# Patient Record
Sex: MALE | Race: BLACK OR AFRICAN AMERICAN | NOT HISPANIC OR LATINO | Employment: UNEMPLOYED | URBAN - METROPOLITAN AREA
[De-identification: names, ages, dates, MRNs, and addresses within clinical notes are randomized per-mention and may not be internally consistent; named-entity substitution may affect disease eponyms.]

---

## 2017-03-08 NOTE — PROGRESS NOTES
"  SUBJECTIVE:                                                    Stewart Turcios is a 51 year old male who presents to clinic today for the following health issues:      Diabetes Follow-up  Glipizide 10mg qd, Insulin - Lantus 22U SubQ qd, Humalog 6U SubQ qd, Metformin 1,000mg BID  Patient is checking blood sugars: once daily.  Results are as follows:         am - 150-200    Diabetic concerns: None     Symptoms of hypoglycemia (low blood sugar): none     Paresthesias (numbness or burning in feet) or sores: No     Date of last diabetic eye exam: December 2016     Notes that he sometimes skips taking his Lantus at night time when he is \"tired\".    Typically only eats twice per day, once in morning and once in evening, so he does not take any insulin during the day. When he gets home in the evening his glucose levels are ~80-90 before eating dinner.    Hyperlipidemia Follow-up  Simvastatin 10mg qd    Rate your low fat/cholesterol diet?: not monitoring fat    Taking statin?  Yes, no muscle aches from statin    Other lipid medications/supplements?:  none     Hypertension Follow-up  Losartan 50mg qd    Outpatient blood pressures are not being checked.    Low Salt Diet: not monitoring salt     Medication Follow-up of BPH  Flomax 0.4mg qd     Taking Medication as prescribed: NO, ran out of medication    Side Effects:  None    Medication Helping Symptoms:  Yes, improved urinary frequency when taking the medication     - Normal colonoscopy 8/27/2008. On q 10 year colonoscopy schedule.      Family History: NO family history of prostate cancer.        Amount of exercise or physical activity: push-ups 2-3 days/week for an average of 15-30 minutes    Problems taking medications regularly: No    Medication side effects: none    Diet: watching portions (wife is on a diet)      Reviewed and updated as needed this visit by clinical staff  Tobacco  Allergies  Meds  Med Hx  Surg Hx  Fam Hx  Soc Hx      Reviewed and updated as " "needed this visit by Provider         ROS:   C: NEGATIVE for fever, chills, change in weight  R: NEGATIVE for significant cough or SOB  CV: NEGATIVE for chest pain, palpitations or peripheral edema  GI: NEGATIVE for nausea, abdominal pain, heartburn, or change in bowel habits  : POSITIVE for urinary frequency. NEGATIVE for dysuria, hematuria, decreased urinary stream, erectile dysfunction  MS: NEGATIVE for muscle aches      This document serves as a record of the services and decisions personally performed and made by Anahi Jean MD. It was created on his behalf by Lisandra Guido, a trained medical scribe. The creation of this document is based the provider's statements to the medical scribe.  Lisandra Guido 10:17 AM March 10, 2017    OBJECTIVE:                                                    /76 (BP Location: Left arm, Patient Position: Chair, Cuff Size: Adult Large)  Pulse 64  Temp 97.2  F (36.2  C) (Tympanic)  Ht 5' 11\" (1.803 m)  Wt 192 lb 8 oz (87.3 kg)  BMI 26.85 kg/m2  Body mass index is 26.85 kg/(m^2).     GENERAL: healthy, alert and no distress  RESP: lungs clear to auscultation - no rales, rhonchi or wheezes  CV: regular rate and rhythm, normal S1 S2, no murmur, no peripheral edema  MS: no gross musculoskeletal defects noted, no edema  PSYCH: mentation appears normal, affect normal/bright      Lab Results   Component Value Date    A1C 8.1 03/10/2017    A1C 8.7 07/07/2016    A1C 8.2 05/12/2016    A1C 8.1 03/14/2016    A1C 8.5 10/28/2015        ASSESSMENT/PLAN:                                                      (E11.9) Diabetes mellitus type 2, controlled, without complications (H)  (primary encounter diagnosis)  Comment: A1c 8.1, improved but goal is <8.0. Improved diet, but inconsistent evening Lantus use.  Plan: Hemoglobin A1c, FOOT EXAM - Increase evening Lantus by 2 units. Advised to take medications consistently.    (E78.5) Hyperlipidemia with target LDL less than 100  Comment: Primary " prevention. Tolerating statin well.  Plan: Continue statin.    (I10) Hypertension goal BP (blood pressure) < 140/90  Comment: /76, within guidelines on ARB.  Plan: Continue Losartan.    (N40.1,  R35.0) Benign prostatic hypertrophy with urinary frequency  Comment: Symptoms improved with medication.  Plan: tamsulosin (FLOMAX) 0.4 MG capsule - Refilled.        Patient will follow up in 3 months or sooner, PRN. Patient instructed to call with any questions or concerns.    Patient Instructions   *   The A1c, a 3 month average of your blood sugars, is 8.1. It's better, it was 8.7. Take the Lantus consistently.     *   If you have fasting morning blood sugars are > 130 for most to the week, may increase the Lantus, (evening shot), by 2 units per week.     *   Take the other medications.     *    Restart the Flomax.     *   Colonoscopy in August 2018.     *    Follow up in 3 months.       The information in this document, created by a scribe for me, accurately reflects the services I personally performed and the decisions made by me. I have reviewed and approved this document for accuracy.  10:33 AM March 10, 2017    Anahi Jean MD  [facility]

## 2017-03-10 ENCOUNTER — OFFICE VISIT (OUTPATIENT)
Dept: FAMILY MEDICINE | Facility: CLINIC | Age: 52
End: 2017-03-10
Payer: COMMERCIAL

## 2017-03-10 VITALS
SYSTOLIC BLOOD PRESSURE: 128 MMHG | WEIGHT: 192.5 LBS | DIASTOLIC BLOOD PRESSURE: 76 MMHG | TEMPERATURE: 97.2 F | HEART RATE: 64 BPM | HEIGHT: 71 IN | BODY MASS INDEX: 26.95 KG/M2

## 2017-03-10 DIAGNOSIS — R35.0 BENIGN PROSTATIC HYPERTROPHY WITH URINARY FREQUENCY: ICD-10-CM

## 2017-03-10 DIAGNOSIS — Z79.4 CONTROLLED TYPE 2 DIABETES MELLITUS WITHOUT COMPLICATION, WITH LONG-TERM CURRENT USE OF INSULIN (H): Primary | ICD-10-CM

## 2017-03-10 DIAGNOSIS — E11.9 CONTROLLED TYPE 2 DIABETES MELLITUS WITHOUT COMPLICATION, WITH LONG-TERM CURRENT USE OF INSULIN (H): Primary | ICD-10-CM

## 2017-03-10 DIAGNOSIS — I10 HYPERTENSION GOAL BP (BLOOD PRESSURE) < 140/90: ICD-10-CM

## 2017-03-10 DIAGNOSIS — E78.5 HYPERLIPIDEMIA WITH TARGET LDL LESS THAN 100: ICD-10-CM

## 2017-03-10 DIAGNOSIS — N40.1 BENIGN PROSTATIC HYPERTROPHY WITH URINARY FREQUENCY: ICD-10-CM

## 2017-03-10 LAB — HBA1C MFR BLD: 8.1 % (ref 4.3–6)

## 2017-03-10 PROCEDURE — 99207 C FOOT EXAM  NO CHARGE: CPT | Performed by: FAMILY MEDICINE

## 2017-03-10 PROCEDURE — 99214 OFFICE O/P EST MOD 30 MIN: CPT | Performed by: FAMILY MEDICINE

## 2017-03-10 PROCEDURE — 36415 COLL VENOUS BLD VENIPUNCTURE: CPT | Performed by: FAMILY MEDICINE

## 2017-03-10 PROCEDURE — 83036 HEMOGLOBIN GLYCOSYLATED A1C: CPT | Performed by: FAMILY MEDICINE

## 2017-03-10 RX ORDER — TAMSULOSIN HYDROCHLORIDE 0.4 MG/1
0.4 CAPSULE ORAL DAILY
Qty: 90 CAPSULE | Refills: 3 | Status: SHIPPED | OUTPATIENT
Start: 2017-03-10 | End: 2017-07-10

## 2017-03-10 NOTE — NURSING NOTE
"Chief Complaint   Patient presents with     Diabetes       Initial /76 (BP Location: Left arm, Patient Position: Chair, Cuff Size: Adult Large)  Pulse 64  Temp 97.2  F (36.2  C) (Tympanic)  Ht 5' 11\" (1.803 m)  Wt 192 lb 8 oz (87.3 kg)  BMI 26.85 kg/m2 Estimated body mass index is 26.85 kg/(m^2) as calculated from the following:    Height as of this encounter: 5' 11\" (1.803 m).    Weight as of this encounter: 192 lb 8 oz (87.3 kg).  Medication Reconciliation: complete    "

## 2017-03-10 NOTE — PATIENT INSTRUCTIONS
*   The A1c, a 3 month average of your blood sugars, is 8.1. It's better, it was 8.7. Take the Lantus consistently.     *   If you have fasting morning blood sugars are > 130 for most to the week, may increase the Lantus, (evening shot), by 2 units per week.     *   Take the other medications.     *    Restart the Flomax.     *   Colonoscopy in August 2018.     *    Follow up in 3 months.

## 2017-03-10 NOTE — MR AVS SNAPSHOT
After Visit Summary   3/10/2017    Stewart Turcios    MRN: [id number]           Patient Information     Date Of Birth          1965        Visit Information        Provider Department      3/10/2017 10:40 AM Anahi Jean MD [facility]        Today's Diagnoses     Diabetes mellitus type 2, controlled, without complications (H)    -  1    Hyperlipidemia with target LDL less than 100        Hypertension goal BP (blood pressure) < 140/90        Benign prostatic hypertrophy with urinary frequency          Care Instructions    *   The A1c, a 3 month average of your blood sugars, is 8.1. It's better, it was 8.7. Take the Lantus consistently.     *   If you have fasting morning blood sugars are > 130 for most to the week, may increase the Lantus, (evening shot), by 2 units per week.     *   Take the other medications.     *    Restart the Flomax.     *   Colonoscopy in August 2018.     *    Follow up in 3 months.         Follow-ups after your visit        Your next 10 appointments already scheduled     Mar 10, 2017 10:40 AM CST   Office Visit with Anahi Jean MD   [facility]    [address] [place] [id number] [phone]           Bring a current list of meds and any records pertaining to this visit.  For Physicals, please bring immunization records and any forms needing to be filled out.  Please arrive 10 minutes early to complete paperwork.              Who to contact     Normal or non-critical lab and imaging results will be communicated to you by MyChart, letter or phone within 4 business days after the clinic has received the results. If you do not hear from us within 7 days, please contact the clinic through MyChart or phone. If you have a critical or abnormal lab result, we will notify you by phone as soon as possible.  Submit refill requests through [employer] or call your pharmacy and they will forward the  "refill request to us. Please allow 3 business days for your refill to be completed.          If you need to speak with a  for additional information , please call: [phone]           Additional Information About Your Visit        [employer] Information     [employer] lets you send messages to your doctor, view your test results, renew your prescriptions, schedule appointments and more. To sign up, go to [email or web address] . Click on \"Log in\" on the left side of the screen, which will take you to the Welcome page. Then click on \"Sign up Now\" on the right side of the page.     You will be asked to enter the access code listed below, as well as some personal information. Please follow the directions to create your username and password.     Your access code is: [email or web address]  Expires: 2017 10:30 AM     Your access code will  in 90 days. If you need help or a new code, please call your [facility] clinic or [phone].        Care EveryWhere ID     This is your Care EveryWhere ID. This could be used by other organizations to access your [facility] medical records  [id number]        Your Vitals Were     Pulse Temperature Height BMI (Body Mass Index)          64 97.2  F (36.2  C) (Tympanic) 5' 11\" (1.803 m) 26.85 kg/m2         Blood Pressure from Last 3 Encounters:   03/10/17 128/76   16 134/74   16 118/70    Weight from Last 3 Encounters:   03/10/17 192 lb 8 oz (87.3 kg)   16 195 lb 2 oz (88.5 kg)   16 188 lb 6 oz (85.4 kg)              We Performed the Following     FOOT EXAM     Hemoglobin A1c          Today's Medication Changes          These changes are accurate as of: 3/10/17 10:35 AM.  If you have any questions, ask your nurse or doctor.               These medicines have changed or have updated prescriptions.        Dose/Directions    insulin glargine 100 UNIT/ML injection   Commonly known as:  LANTUS SOLOSTAR   This may have changed:  Another medication with the " same name was removed. Continue taking this medication, and follow the directions you see here.   Used for:  Controlled type 2 diabetes mellitus without complication, with long-term current use of insulin (H)   Changed by:  Anahi Jean MD        22 units at bedtime   Quantity:  3 Month   Refills:  1       * tamsulosin 0.4 MG capsule   Commonly known as:  FLOMAX   This may have changed:  Another medication with the same name was added. Make sure you understand how and when to take each.   Used for:  Increased frequency of urination   Changed by:  Anahi Jean MD        Dose:  0.4 mg   Take 1 capsule (0.4 mg) by mouth daily   Quantity:  90 capsule   Refills:  1       * tamsulosin 0.4 MG capsule   Commonly known as:  FLOMAX   This may have changed:  You were already taking a medication with the same name, and this prescription was added. Make sure you understand how and when to take each.   Used for:  Benign prostatic hypertrophy with urinary frequency   Changed by:  Anahi Jean MD        Dose:  0.4 mg   Take 1 capsule (0.4 mg) by mouth daily   Quantity:  90 capsule   Refills:  3       * Notice:  This list has 2 medication(s) that are the same as other medications prescribed for you. Read the directions carefully, and ask your doctor or other care provider to review them with you.      Stop taking these medicines if you haven't already. Please contact your care team if you have questions.     azithromycin 250 MG tablet   Commonly known as:  ZITHROMAX   Stopped by:  Anahi Jean MD           benzonatate 200 MG capsule   Commonly known as:  TESSALON   Stopped by:  Anahi Jean MD                Where to get your medicines      These medications were sent to [id number] [place] [address] [facility] [address]     Phone:  [phone]     tamsulosin 0.4 MG capsule                Primary Care Provider Office Phone # Fax #    Anahi Jean MD  [phone]       [facility] [address] [facility]   [place] [address]        Thank you!     Thank you for choosing [facility]  for your care. Our goal is always to provide you with excellent care. Hearing back from our patients is one way we can continue to improve our services. Please take a few minutes to complete the written survey that you may receive in the mail after your visit with us. Thank you!             Your Updated Medication List - Protect others around you: Learn how to safely use, store and throw away your medicines at www.disposemymeds.org.          This list is accurate as of: 3/10/17 10:35 AM.  Always use your most recent med list.                   Brand Name Dispense Instructions for use    aspirin 81 MG tablet     100    1 tab po QD (Once per day)       blood glucose lancing device     1 each    Device to be used with lancets.       blood glucose monitoring lancets     1 Box    Use to test blood sugar four times daily or as directed.       * blood glucose monitoring test strip    ACCU-CHEK SMARTVIEW    50 each    Use to test blood sugar two times daily or as directed.       * blood glucose monitoring test strip    ACCU-CHEK ANDER    300 each    Use to test blood sugars 4 times daily or as directed.       BLOOD GLUCOSE TEST STRIPS STRP     qs    twice daily and prn       glipiZIDE 10 MG 24 hr tablet    glipiZIDE XL    90 tablet    Take 1 tablet (10 mg) by mouth daily       insulin glargine 100 UNIT/ML injection    LANTUS SOLOSTAR    3 Month    22 units at bedtime       insulin lispro 100 UNIT/ML injection    HumaLOG KWIKpen    12 mL    6 units before breakfast, 6   units before dinner       * insulin pen needle 31G X 8 MM    B-D U/F    100 each    As directed.       * insulin pen needle 32G X 4 MM    BD MUSA U/F    200 each    Use 4 times daily or as directed.       losartan 50 MG tablet    COZAAR    90 tablet    Take 1 tablet (50 mg) by mouth daily        metFORMIN 500 MG tablet    GLUCOPHAGE    180 tablet    Take 2 tablets (1,000 mg) by mouth 2 times daily (with meals) 2 tablets 2 times daily       MULTIVITAMIN PO          sildenafil 20 MG tablet    REVATIO/VIAGRA    90 tablet    Take 1 tablet (20 mg) by mouth once as needed       simvastatin 10 MG tablet    ZOCOR    90 tablet    1 TABLET AT BEDTIME       * tamsulosin 0.4 MG capsule    FLOMAX    90 capsule    Take 1 capsule (0.4 mg) by mouth daily       * tamsulosin 0.4 MG capsule    FLOMAX    90 capsule    Take 1 capsule (0.4 mg) by mouth daily       VITAMIN D3 PO      Take 2,000 Units by mouth daily       * Notice:  This list has 6 medication(s) that are the same as other medications prescribed for you. Read the directions carefully, and ask your doctor or other care provider to review them with you.

## 2017-03-10 NOTE — Clinical Note
Please abstract the following data from this visit with this patient into the appropriate field in Epic:  Eye exam with ophthalmology on this date: 12/1/2016

## 2017-04-16 DIAGNOSIS — E11.9 CONTROLLED TYPE 2 DIABETES MELLITUS WITHOUT COMPLICATION, UNSPECIFIED LONG TERM INSULIN USE STATUS: Primary | ICD-10-CM

## 2017-04-17 NOTE — TELEPHONE ENCOUNTER
Metformin 500mg         Last Written Prescription Date: 07/18/2016 #180 x 3  Last filled - not provided, e-refill request  Last Office Visit with [facility], P or [facility] prescribing provider:  03/10/2017 DAISY Jean        BP Readings from Last 3 Encounters:   03/10/17 128/76   07/18/16 134/74   03/14/16 118/70     Lab Results   Component Value Date    MICROL 7 05/22/2015     Lab Results   Component Value Date    UMALCR 7.46 05/22/2015     Creatinine   Date Value Ref Range Status   05/12/2016 0.97 mg/dL Final   ]  GFR Estimate   Date Value Ref Range Status   10/28/2015 82 >60 mL/min/1.7m2 Final     Comment:     Non  GFR Calc   04/20/2015 >60 ml/min/1.73m2 Final   11/17/2014 >60 ml/min/1.73m2 Final     GFR Estimate If Black   Date Value Ref Range Status   10/28/2015 >90   GFR Calc   >60 mL/min/1.7m2 Final   04/20/2015 >60 ml/min/1.73m2 Final   11/17/2014 >60 ml/min/1.73m2 Final     Lab Results   Component Value Date    CHOL 152 05/22/2015     Lab Results   Component Value Date    HDL 43 05/12/2016     Lab Results   Component Value Date    LDL 84 05/12/2016     Lab Results   Component Value Date    TRIG 183 05/12/2016     Lab Results   Component Value Date    CHOLHDLRATIO 4.1 05/22/2015     Lab Results   Component Value Date    AST 22 04/20/2015     Lab Results   Component Value Date    ALT 17 04/20/2015     Lab Results   Component Value Date    A1C 8.1 03/10/2017    A1C 8.7 07/07/2016    A1C 8.2 05/12/2016    A1C 8.1 03/14/2016    A1C 8.5 10/28/2015     Potassium   Date Value Ref Range Status   10/28/2015 4.0 3.4 - 5.3 mmol/L Final

## 2017-05-08 DIAGNOSIS — E11.9 DIABETES MELLITUS TYPE 2, CONTROLLED, WITHOUT COMPLICATIONS (H): Primary | ICD-10-CM

## 2017-05-09 NOTE — TELEPHONE ENCOUNTER
Accu-Chek test strips      Last Written Prescription Date: 04/22/2016 #50 x 4  Last filled - not provided  Last Office Visit with [facility], [facility] or [facility] prescribing provider: 03/10/2017 DAISY Jean

## 2017-05-10 RX ORDER — BLOOD SUGAR DIAGNOSTIC
STRIP MISCELLANEOUS
Qty: 200 STRIP | Refills: 3 | Status: SHIPPED | OUTPATIENT
Start: 2017-05-10 | End: 2018-05-29

## 2017-05-10 NOTE — TELEPHONE ENCOUNTER
Prescription approved per [facility] Refill Protocol or patient Primary care provider (PCP)  TARA Diaz RN/Malcolm Ledesma

## 2017-05-20 DIAGNOSIS — Z79.4 CONTROLLED TYPE 2 DIABETES MELLITUS WITHOUT COMPLICATION, WITH LONG-TERM CURRENT USE OF INSULIN (H): ICD-10-CM

## 2017-05-20 DIAGNOSIS — E11.9 CONTROLLED TYPE 2 DIABETES MELLITUS WITHOUT COMPLICATION, WITH LONG-TERM CURRENT USE OF INSULIN (H): ICD-10-CM

## 2017-05-22 NOTE — TELEPHONE ENCOUNTER
Prescription approved per [facility] Refill Protocol or patient Primary care provider (PCP)  TARA Diaz RN/Malcolm Ledesma

## 2017-05-25 ENCOUNTER — TELEPHONE (OUTPATIENT)
Dept: FAMILY MEDICINE | Facility: CLINIC | Age: 52
End: 2017-05-25

## 2017-05-25 DIAGNOSIS — Z79.4 CONTROLLED TYPE 2 DIABETES MELLITUS WITHOUT COMPLICATION, WITH LONG-TERM CURRENT USE OF INSULIN (H): Primary | ICD-10-CM

## 2017-05-25 DIAGNOSIS — E11.9 CONTROLLED TYPE 2 DIABETES MELLITUS WITHOUT COMPLICATION, WITH LONG-TERM CURRENT USE OF INSULIN (H): Primary | ICD-10-CM

## 2017-05-30 PROBLEM — E11.9 CONTROLLED TYPE 2 DIABETES MELLITUS WITHOUT COMPLICATION, WITH LONG-TERM CURRENT USE OF INSULIN (H): Status: ACTIVE | Noted: 2017-05-30

## 2017-05-30 PROBLEM — Z79.4 CONTROLLED TYPE 2 DIABETES MELLITUS WITHOUT COMPLICATION, WITH LONG-TERM CURRENT USE OF INSULIN (H): Status: ACTIVE | Noted: 2017-05-30

## 2017-05-30 RX ORDER — INSULIN GLARGINE 100 [IU]/ML
24 INJECTION, SOLUTION SUBCUTANEOUS AT BEDTIME
Qty: 9 ML | Refills: 3 | Status: SHIPPED | OUTPATIENT
Start: 2017-05-30 | End: 2018-05-31

## 2017-05-30 NOTE — TELEPHONE ENCOUNTER
Received response from [facility]    Response faxed to the pharmacy, routed to the provider            Full denial documentation is in my office if you should have any further question.     Hakan Burdick RT (r)  [facility]

## 2017-05-30 NOTE — TELEPHONE ENCOUNTER
Received PA request for Lantus Solostar from [facility] [place]  Pharmacy Rejection Note: 75 Prior Authorization Required    Sig: Inject 24 Units Subcutaneous At Bedtime  Disp: 15 per 62  PRAVEENA: no    No previous PA on file for this med.    Dx: Controlled type 2 diabetes mellitus without complication, with long-term current use of insulin (H) [E11.9, Z79.4]   Rationale: Tx ofControlled type 2 diabetes mellitus without complication, with long-term current use of insulin (H) [E11.9, Z79.4]      Provided Ins: [employer]  Provided Ins ID: [id number]  Provided Ins Phone # [phone] Pharmacy Help Desk, aka [employer]    PA submitted to [employer] via [employer], Keycode [id number]    Hakan Burdick RT (r)  [facility]

## 2017-06-08 DIAGNOSIS — E78.5 HYPERLIPIDEMIA LDL GOAL <100: ICD-10-CM

## 2017-06-08 RX ORDER — SIMVASTATIN 10 MG
TABLET ORAL
Qty: 90 TABLET | Refills: 3 | Status: SHIPPED | OUTPATIENT
Start: 2017-06-08 | End: 2018-07-10

## 2017-06-08 NOTE — TELEPHONE ENCOUNTER
Prescription approved per [facility] Refill Protocol or patient Primary care provider (PCP)  TARA Diaz RN/Malcolm Ledesma

## 2017-06-08 NOTE — TELEPHONE ENCOUNTER
SIMVASTATIN 10 MG TABLET     Last Written Prescription Date: 8/31/2016  Last Fill Quantity: 90, # refills: 1  Last Office Visit with [facility], [facility] or [facility] prescribing provider: 3/10/2017       Lab Results   Component Value Date    CHOL 152 05/22/2015     Lab Results   Component Value Date    HDL 43 05/12/2016     Lab Results   Component Value Date    LDL 84 05/12/2016     Lab Results   Component Value Date    TRIG 183 05/12/2016     Lab Results   Component Value Date    CHOLHDLRATIO 4.1 05/22/2015

## 2017-06-09 DIAGNOSIS — E11.9 CONTROLLED TYPE 2 DIABETES MELLITUS WITHOUT COMPLICATION, UNSPECIFIED LONG TERM INSULIN USE STATUS: ICD-10-CM

## 2017-06-09 NOTE — TELEPHONE ENCOUNTER
Metformin 500mg         Last Written Prescription Date: 04/19/2017  #180 x 1  Last filled 04/19/2017  Last Office Visit with [facility], P or [facility] prescribing provider:  03/10/2017 DAISY Jean        BP Readings from Last 3 Encounters:   03/10/17 128/76   07/18/16 134/74   03/14/16 118/70     Lab Results   Component Value Date    MICROL 7 05/22/2015     Lab Results   Component Value Date    UMALCR 7.46 05/22/2015     Creatinine   Date Value Ref Range Status   05/12/2016 0.97 mg/dL Final   ]  GFR Estimate   Date Value Ref Range Status   10/28/2015 82 >60 mL/min/1.7m2 Final     Comment:     Non  GFR Calc   04/20/2015 >60 ml/min/1.73m2 Final   11/17/2014 >60 ml/min/1.73m2 Final     GFR Estimate If Black   Date Value Ref Range Status   10/28/2015 >90   GFR Calc   >60 mL/min/1.7m2 Final   04/20/2015 >60 ml/min/1.73m2 Final   11/17/2014 >60 ml/min/1.73m2 Final     Lab Results   Component Value Date    CHOL 152 05/22/2015     Lab Results   Component Value Date    HDL 43 05/12/2016     Lab Results   Component Value Date    LDL 84 05/12/2016     Lab Results   Component Value Date    TRIG 183 05/12/2016     Lab Results   Component Value Date    CHOLHDLRATIO 4.1 05/22/2015     Lab Results   Component Value Date    AST 22 04/20/2015     Lab Results   Component Value Date    ALT 17 04/20/2015     Lab Results   Component Value Date    A1C 8.1 03/10/2017    A1C 8.7 07/07/2016    A1C 8.2 05/12/2016    A1C 8.1 03/14/2016    A1C 8.5 10/28/2015     Potassium   Date Value Ref Range Status   10/28/2015 4.0 3.4 - 5.3 mmol/L Final

## 2017-06-09 NOTE — TELEPHONE ENCOUNTER
Medication is being filled for 1 time refill only due to:  Due for diabetes visit. .me2 Sonia Bland RN

## 2017-07-10 ENCOUNTER — OFFICE VISIT (OUTPATIENT)
Dept: FAMILY MEDICINE | Facility: CLINIC | Age: 52
End: 2017-07-10
Payer: COMMERCIAL

## 2017-07-10 VITALS
HEIGHT: 71 IN | WEIGHT: 189.25 LBS | BODY MASS INDEX: 26.49 KG/M2 | HEART RATE: 76 BPM | DIASTOLIC BLOOD PRESSURE: 84 MMHG | SYSTOLIC BLOOD PRESSURE: 126 MMHG

## 2017-07-10 DIAGNOSIS — Z79.4 CONTROLLED TYPE 2 DIABETES MELLITUS WITHOUT COMPLICATION, WITH LONG-TERM CURRENT USE OF INSULIN (H): Primary | ICD-10-CM

## 2017-07-10 DIAGNOSIS — I10 HYPERTENSION GOAL BP (BLOOD PRESSURE) < 140/90: ICD-10-CM

## 2017-07-10 DIAGNOSIS — E11.9 CONTROLLED TYPE 2 DIABETES MELLITUS WITHOUT COMPLICATION, WITH LONG-TERM CURRENT USE OF INSULIN (H): Primary | ICD-10-CM

## 2017-07-10 DIAGNOSIS — E78.5 HYPERLIPIDEMIA WITH TARGET LDL LESS THAN 100: ICD-10-CM

## 2017-07-10 LAB — HBA1C MFR BLD: 8 % (ref 4.3–6)

## 2017-07-10 PROCEDURE — 84443 ASSAY THYROID STIM HORMONE: CPT | Performed by: FAMILY MEDICINE

## 2017-07-10 PROCEDURE — 83721 ASSAY OF BLOOD LIPOPROTEIN: CPT | Performed by: FAMILY MEDICINE

## 2017-07-10 PROCEDURE — 99214 OFFICE O/P EST MOD 30 MIN: CPT | Performed by: FAMILY MEDICINE

## 2017-07-10 PROCEDURE — 83036 HEMOGLOBIN GLYCOSYLATED A1C: CPT | Performed by: FAMILY MEDICINE

## 2017-07-10 PROCEDURE — 36415 COLL VENOUS BLD VENIPUNCTURE: CPT | Performed by: FAMILY MEDICINE

## 2017-07-10 PROCEDURE — 80048 BASIC METABOLIC PNL TOTAL CA: CPT | Performed by: FAMILY MEDICINE

## 2017-07-10 NOTE — LETTER
September 18, 2017      Stewart Turcios  [address] [facility] [address]        Dear ,    I don't remember if I sent you your test results from the last visit. If not, I apologize. I know we discussed your A1c, but we  also tested for thyroid function, cholesterol, and kidney function. Please seen enclosed copies.     Overall, the blood tests are very good. You have normal thyroid function, your bad cholesterol, or LDL, is acceptable and your kidney function is normal.     I hope things are going well. I would recommend a follow-up appointment in December. Please call with any questions or concerns.     Resulted Orders   Hemoglobin A1c   Result Value Ref Range    Hemoglobin A1C 8.0 (H) 4.3 - 6.0 %   Basic metabolic panel  (Ca, Cl, CO2, Creat, Gluc, K, Na, BUN)   Result Value Ref Range    Sodium 135 133 - 144 mmol/L    Potassium 4.2 3.4 - 5.3 mmol/L    Chloride 102 94 - 109 mmol/L    Carbon Dioxide 26 20 - 32 mmol/L    Anion Gap 7 3 - 14 mmol/L    Glucose 109 (H) 70 - 99 mg/dL    Urea Nitrogen 11 7 - 30 mg/dL    Creatinine 0.76 0.66 - 1.25 mg/dL    GFR Estimate >90  Non  GFR Calc   >60 mL/min/1.7m2    GFR Estimate If Black >90   GFR Calc   >60 mL/min/1.7m2    Calcium 9.3 8.5 - 10.1 mg/dL   LDL cholesterol direct   Result Value Ref Range    LDL Cholesterol Direct 76 <100 mg/dL      Comment:      Desirable:       <100 mg/dl   TSH with free T4 reflex   Result Value Ref Range    TSH 1.12 0.40 - 4.00 mU/L             Sincerely,        Anahi Jean MD

## 2017-07-10 NOTE — PROGRESS NOTES
"  SUBJECTIVE:                                                    Stewart Turcios is a 51 year old male who presents to clinic today for the following health issues:      Diabetes Follow-up  Metformin 1,000mg bid, Basaglar 24U SubQ qd, glipizide XL 10mg qd, Humalog - bid using sliding scale  Patient is checking blood sugars: twice daily.    Blood sugar testing frequency justification: Uncontrolled diabetes  Results are as follows:         am - 200s         dinnertime -     Diabetic concerns: Dry eyes     Symptoms of hypoglycemia (low blood sugar): none     Paresthesias (numbness or burning in feet) or sores: No     Date of last diabetic eye exam: 12/2016    Hyperlipidemia Follow-Up  Simvastatin 10mg qd    Rate your low fat/cholesterol diet?: good    Taking statin?  Yes, no muscle aches from statin    Other lipid medications/supplements?:  none    Hypertension Follow-up  Losartan 50mg qd    Outpatient blood pressures are not being checked.    Low Salt Diet: low salt      Medication Followup of benign prostatic hyperplasia  Flomax 0.4mg qd    Taking Medication as prescribed: yes    Side Effects:  None    Medication Helping Symptoms:  yes     - Mouth sores x3-4 days. Sores are not painful but area is tender when brushing teeth. He is also using oral mouth wash.     - patient states he has dry eyes at night. He is wondering about which OTC eye drops would be the best to alleviate his symptoms.         ROS:  Constitutional, HEENT, cardiovascular, pulmonary, gi and gu systems are negative, except as otherwise noted.    This document serves as a record of the services and decisions personally performed and made by Anahi Jean MD. It was created on his behalf by Mannie Mac, a trained medical scribe. The creation of this document is based the provider's statements to the medical scribe.  Mannie Mac 3:03 PM July 10, 2017  OBJECTIVE:   /84  Pulse 76  Ht 5' 11\" (1.803 m)  Wt 189 lb 4 oz (85.8 kg)  " BMI 26.4 kg/m2  Body mass index is 26.4 kg/(m^2).       GENERAL: healthy, alert and no distress  EYES: Eyes grossly normal to inspection, conjunctivae and sclerae normal  HENT: ear canals and TM's normal, nose and mouth without ulcers or lesions  RESP: lungs clear to auscultation - no rales, rhonchi or wheezes  CV: regular rate and rhythm, normal S1 S2, no murmur  ABDOMEN: soft, nontender  MS: no gross musculoskeletal defects noted, no edema  SKIN: no suspicious lesions or rashes  NEURO: Normal strength and tone, mentation intact and speech normal  PSYCH: mentation appears normal, affect normal/bright        Lab Results   Component Value Date    A1C 8.0 07/10/2017    A1C 8.1 03/10/2017    A1C 8.7 07/07/2016    A1C 8.2 05/12/2016    A1C 8.1 03/14/2016         ASSESSMENT/PLAN:     (E11.9,  Z79.4) Controlled type 2 diabetes mellitus without complication, with long-term current use of insulin (H)  (primary encounter diagnosis)  Comment: A1c is 8.0 today, improved from the previous visit. Doing well with current medications. Discussed some lifestyle modifications. Routine labs, results pending.   Plan: Hemoglobin A1c, Albumin Random Urine         Quantitative, TSH with free T4 reflex            (I10) Hypertension goal BP (blood pressure) < 140/90  Comment: BP within guidelines with use of ARB. Lab results pending.   Plan: Basic metabolic panel  (Ca, Cl, CO2, Creat,         Gluc, K, Na, BUN), Albumin Random Urine         Quantitative            (E78.5) Hyperlipidemia with target LDL less than 100  Comment: Doing well with moderate intensity statin therapy. Routine screen, lab results pending.   Plan: LDL cholesterol direct                Patient Instructions   *   For the eyes, use the moisturizing drops as needed. Doesn't sound like allergies.     *   Can try a small breakfast, something with some protein, no just carbs.     *   The A1c, a 3 month average of your blood sugars, is 8.0.     *    Will check yearly blood  "tests, cholesterol, kidney function, thyroid.     *    Follow up in 3 - 6 months. Can get a \"lab only\" A1c in 3 months. No need for office visit.         Patient will follow up in 3-6 months or sooner, PRN. Patient instructed to call with any questions or concerns.      The information in this document, created by a scribe for me, accurately reflects the services I personally performed and the decisions made by me. I have reviewed and approved this document for accuracy. 3:03 PM 7/10/2017    Anahi Jean MD  [facility]    "

## 2017-07-10 NOTE — NURSING NOTE
"Chief Complaint   Patient presents with     Diabetes       Initial /84  Pulse 76  Ht 5' 11\" (1.803 m)  Wt 189 lb 4 oz (85.8 kg)  BMI 26.4 kg/m2 Estimated body mass index is 26.4 kg/(m^2) as calculated from the following:    Height as of this encounter: 5' 11\" (1.803 m).    Weight as of this encounter: 189 lb 4 oz (85.8 kg).  Medication Reconciliation: complete  "

## 2017-07-10 NOTE — PATIENT INSTRUCTIONS
"*   For the eyes, use the moisturizing drops as needed. Doesn't sound like allergies.     *   Can try a small breakfast, something with some protein, no just carbs.     *   The A1c, a 3 month average of your blood sugars, is 8.0.     *    Will check yearly blood tests, cholesterol, kidney function, thyroid.     *    Follow up in 3 - 6 months. Can get a \"lab only\" A1c in 3 months. No need for office visit.   "

## 2017-07-10 NOTE — MR AVS SNAPSHOT
"              After Visit Summary   7/10/2017    Stewart Turcios    MRN: [id number]           Patient Information     Date Of Birth          1965        Visit Information        Provider Department      7/10/2017 3:00 PM Anahi Jean MD [facility]        Today's Diagnoses     Controlled type 2 diabetes mellitus without complication, with long-term current use of insulin (H)    -  1    Hypertension goal BP (blood pressure) < 140/90        Hyperlipidemia with target LDL less than 100          Care Instructions    *   For the eyes, use the moisturizing drops as needed. Doesn't sound like allergies.     *   Can try a small breakfast, something with some protein, no just carbs.     *   The A1c, a 3 month average of your blood sugars, is 8.0.     *    Will check yearly blood tests, cholesterol, kidney function, thyroid.     *    Follow up in 3 - 6 months. Can get a \"lab only\" A1c in 3 months. No need for office visit.           Follow-ups after your visit        Who to contact     Normal or non-critical lab and imaging results will be communicated to you by [employer], letter or phone within 4 business days after the clinic has received the results. If you do not hear from us within 7 days, please contact the clinic through [employer] or phone. If you have a critical or abnormal lab result, we will notify you by phone as soon as possible.  Submit refill requests through [employer] or call your pharmacy and they will forward the refill request to us. Please allow 3 business days for your refill to be completed.          If you need to speak with a  for additional information , please call: [phone]           Additional Information About Your Visit        [employer] Information     [employer] lets you send messages to your doctor, view your test results, renew your prescriptions, schedule appointments and more. To sign up, go to [email or web address] . Click on \"Log in\" on the left " "side of the screen, which will take you to the Welcome page. Then click on \"Sign up Now\" on the right side of the page.     You will be asked to enter the access code listed below, as well as some personal information. Please follow the directions to create your username and password.     Your access code is: [email or web address]  Expires: 10/8/2017  3:23 PM     Your access code will  in 90 days. If you need help or a new code, please call your [place] clinic or [phone].        Care EveryWhere ID     This is your Care EveryWhere ID. This could be used by other organizations to access your [place] medical records  [id number]        Your Vitals Were     Pulse Height BMI (Body Mass Index)             76 5' 11\" (1.803 m) 26.4 kg/m2          Blood Pressure from Last 3 Encounters:   07/10/17 126/84   03/10/17 128/76   16 134/74    Weight from Last 3 Encounters:   07/10/17 189 lb 4 oz (85.8 kg)   03/10/17 192 lb 8 oz (87.3 kg)   16 195 lb 2 oz (88.5 kg)              We Performed the Following     Albumin Random Urine Quantitative     Basic metabolic panel  (Ca, Cl, CO2, Creat, Gluc, K, Na, BUN)     Hemoglobin A1c     LDL cholesterol direct     TSH with free T4 reflex          Today's Medication Changes          These changes are accurate as of: 7/10/17  3:24 PM.  If you have any questions, ask your nurse or doctor.               These medicines have changed or have updated prescriptions.        Dose/Directions    tamsulosin 0.4 MG capsule   Commonly known as:  FLOMAX   This may have changed:  Another medication with the same name was removed. Continue taking this medication, and follow the directions you see here.   Used for:  Increased frequency of urination   Changed by:  Anahi Jean MD        Dose:  0.4 mg   Take 1 capsule (0.4 mg) by mouth daily   Quantity:  90 capsule   Refills:  1                Primary Care Provider Office Phone # Fax #    Anahi Jean [phone]       " [facility] [address] [place] [address]        Equal Access to Services     KELVIN JAVIER : Hadii roman morgan hadreeseo Soerickali, waaxda luqadaha, qaybta kaalmada adeduyenyada, sg monteirojacquelineblanca rodriguez. So [facility] [phone].    ATENCIÓN: Si habla español, tiene a lindsay disposición servicios gratuitos de asistencia lingüística. Llame al [phone].    We comply with applicable federal civil rights laws and [place] laws. We do not discriminate on the basis of race, color, national origin, age, disability sex, sexual orientation or gender identity.            Thank you!     Thank you for choosing [facility]  for your care. Our goal is always to provide you with excellent care. Hearing back from our patients is one way we can continue to improve our services. Please take a few minutes to complete the written survey that you may receive in the mail after your visit with us. Thank you!             Your Updated Medication List - Protect others around you: Learn how to safely use, store and throw away your medicines at www.disposemymeds.org.          This list is accurate as of: 7/10/17  3:24 PM.  Always use your most recent med list.                   Brand Name Dispense Instructions for use Diagnosis    aspirin 81 MG tablet     100    1 tab po QD (Once per day)    Type II or unspecified type diabetes mellitus without mention of complication, not stated as uncontrolled       blood glucose lancing device     1 each    Device to be used with lancets.    Diabetes mellitus type 2, controlled, without complications (H)       blood glucose monitoring lancets     1 Box    Use to test blood sugar four times daily or as directed.    Diabetes mellitus type 2, controlled, without complications (H)       * blood glucose monitoring test strip    ACCU-CHEK SMARTVIEW    50 each    Use to test blood sugar two times daily or as directed.    Diabetes mellitus type 2, controlled, without  complications (H)       * blood glucose monitoring test strip    ACCU-CHEK ANDER    300 each    Use to test blood sugars 4 times daily or as directed.    Uncontrolled type II diabetes mellitus (H)       * ACCU-CHEK SMARTVIEW test strip   Generic drug:  blood glucose monitoring     200 strip    USE TO TEST BLOOD SUGAR TWICE DAILY OR AS DIRECTED    Diabetes mellitus type 2, controlled, without complications (H)       BLOOD GLUCOSE TEST STRIPS STRP     qs    twice daily and prn    Type II or unspecified type diabetes mellitus without mention of complication, not stated as uncontrolled       glipiZIDE 10 MG 24 hr tablet    glipiZIDE XL    90 tablet    Take 1 tablet (10 mg) by mouth daily    Controlled type 2 diabetes mellitus without complication, with long-term current use of insulin (H)       * insulin glargine 100 UNIT/ML injection    LANTUS SOLOSTAR    6 mL    Inject 24 Units Subcutaneous At Bedtime    Controlled type 2 diabetes mellitus without complication, with long-term current use of insulin (H)       * insulin glargine 100 UNIT/ML injection     9 mL    Inject 24 Units Subcutaneous At Bedtime    Controlled type 2 diabetes mellitus without complication, with long-term current use of insulin (H)       insulin lispro 100 UNIT/ML injection    HumaLOG KWIKpen    12 mL    6 units before breakfast, 6   units before dinner    Controlled type 2 diabetes mellitus without complication, with long-term current use of insulin (H)       * insulin pen needle 31G X 8 MM    B-D U/F    100 each    As directed.    Uncontrolled type II diabetes mellitus (H)       * insulin pen needle 32G X 4 MM    BD MUSA U/F    200 each    Use 4 times daily or as directed.    Controlled type 2 diabetes mellitus without complication, with long-term current use of insulin (H)       losartan 50 MG tablet    COZAAR    90 tablet    Take 1 tablet (50 mg) by mouth daily    Essential hypertension with goal blood pressure less than 140/90       metFORMIN  500 MG tablet    GLUCOPHAGE    180 tablet    TAKE 2 TABLETS (1,000 MG) BY MOUTH 2 TIMES DAILY (WITH MEALS)    Controlled type 2 diabetes mellitus without complication, unspecified long term insulin use status (H)       MULTIVITAMIN PO           sildenafil 20 MG tablet    REVATIO/VIAGRA    90 tablet    Take 1 tablet (20 mg) by mouth once as needed    Drug-induced erectile dysfunction       simvastatin 10 MG tablet    ZOCOR    90 tablet    TAKE 1 TABLET AT BEDTIME    Hyperlipidemia LDL goal <100       tamsulosin 0.4 MG capsule    FLOMAX    90 capsule    Take 1 capsule (0.4 mg) by mouth daily    Increased frequency of urination       VITAMIN D3 PO      Take 2,000 Units by mouth daily        * Notice:  This list has 7 medication(s) that are the same as other medications prescribed for you. Read the directions carefully, and ask your doctor or other care provider to review them with you.

## 2017-07-11 LAB
ANION GAP SERPL CALCULATED.3IONS-SCNC: 7 MMOL/L (ref 3–14)
BUN SERPL-MCNC: 11 MG/DL (ref 7–30)
CALCIUM SERPL-MCNC: 9.3 MG/DL (ref 8.5–10.1)
CHLORIDE SERPL-SCNC: 102 MMOL/L (ref 94–109)
CO2 SERPL-SCNC: 26 MMOL/L (ref 20–32)
CREAT SERPL-MCNC: 0.76 MG/DL (ref 0.66–1.25)
GFR SERPL CREATININE-BSD FRML MDRD: ABNORMAL ML/MIN/1.7M2
GLUCOSE SERPL-MCNC: 109 MG/DL (ref 70–99)
LDLC SERPL DIRECT ASSAY-MCNC: 76 MG/DL
POTASSIUM SERPL-SCNC: 4.2 MMOL/L (ref 3.4–5.3)
SODIUM SERPL-SCNC: 135 MMOL/L (ref 133–144)
TSH SERPL DL<=0.005 MIU/L-ACNC: 1.12 MU/L (ref 0.4–4)

## 2017-08-16 DIAGNOSIS — N52.2 DRUG-INDUCED ERECTILE DYSFUNCTION: ICD-10-CM

## 2017-08-16 DIAGNOSIS — E11.9 CONTROLLED TYPE 2 DIABETES MELLITUS WITHOUT COMPLICATION, UNSPECIFIED LONG TERM INSULIN USE STATUS: ICD-10-CM

## 2017-08-16 RX ORDER — SILDENAFIL CITRATE 20 MG/1
TABLET ORAL
Qty: 30 TABLET | Refills: 1 | Status: SHIPPED | OUTPATIENT
Start: 2017-08-16 | End: 2017-11-17

## 2017-08-16 NOTE — TELEPHONE ENCOUNTER
Routing refill request to provider for review/approval because:  Drug not on the [facility] refill protocol or controlled substance  [facility]  RN/Malcolm Ledesma

## 2017-08-16 NOTE — TELEPHONE ENCOUNTER
metFORMIN (GLUCOPHAGE) 500 MG tablet         Last Written Prescription Date: 6/9/17  Last Fill Quantity: 180, # refills: 0  Last Office Visit with [facility], [facility] or [facility] prescribing provider:  7/10/17        BP Readings from Last 3 Encounters:   07/10/17 126/84   03/10/17 128/76   07/18/16 134/74     Lab Results   Component Value Date    MICROL 7 05/22/2015     Lab Results   Component Value Date    UMALCR 7.46 05/22/2015     Creatinine   Date Value Ref Range Status   07/10/2017 0.76 0.66 - 1.25 mg/dL Final   ]  GFR Estimate   Date Value Ref Range Status   07/10/2017 >90  Non  GFR Calc   >60 mL/min/1.7m2 Final   10/28/2015 82 >60 mL/min/1.7m2 Final     Comment:     Non  GFR Calc   04/20/2015 >60 ml/min/1.73m2 Final     GFR Estimate If Black   Date Value Ref Range Status   07/10/2017 >90   GFR Calc   >60 mL/min/1.7m2 Final   10/28/2015 >90   GFR Calc   >60 mL/min/1.7m2 Final   04/20/2015 >60 ml/min/1.73m2 Final     Lab Results   Component Value Date    CHOL 152 05/22/2015     Lab Results   Component Value Date    HDL 43 05/12/2016     Lab Results   Component Value Date    LDL 76 07/10/2017    LDL 84 05/12/2016     Lab Results   Component Value Date    TRIG 183 05/12/2016     Lab Results   Component Value Date    CHOLHDLRATIO 4.1 05/22/2015     Lab Results   Component Value Date    AST 22 04/20/2015     Lab Results   Component Value Date    ALT 17 04/20/2015     Lab Results   Component Value Date    A1C 8.0 07/10/2017    A1C 8.1 03/10/2017    A1C 8.7 07/07/2016    A1C 8.2 05/12/2016    A1C 8.1 03/14/2016     Potassium   Date Value Ref Range Status   07/10/2017 4.2 3.4 - 5.3 mmol/L Final             sildenafil (REVATIO/VIAGRA) 20 MG tablet      Last Written Prescription Date: 12/21/16  Last Fill Quantity: 90,  # refills: 1   Last Office Visit with [facility], [facility] or [facility] prescribing provider: 7/10/17

## 2017-09-28 DIAGNOSIS — I10 ESSENTIAL HYPERTENSION WITH GOAL BLOOD PRESSURE LESS THAN 140/90: ICD-10-CM

## 2017-09-29 DIAGNOSIS — Z79.4 CONTROLLED TYPE 2 DIABETES MELLITUS WITHOUT COMPLICATION, WITH LONG-TERM CURRENT USE OF INSULIN (H): Primary | ICD-10-CM

## 2017-09-29 DIAGNOSIS — E11.9 CONTROLLED TYPE 2 DIABETES MELLITUS WITHOUT COMPLICATION, WITH LONG-TERM CURRENT USE OF INSULIN (H): Primary | ICD-10-CM

## 2017-09-29 RX ORDER — LOSARTAN POTASSIUM 50 MG/1
TABLET ORAL
Qty: 90 TABLET | Refills: 1 | Status: SHIPPED | OUTPATIENT
Start: 2017-09-29 | End: 2018-04-21

## 2017-09-29 RX ORDER — BLOOD-GLUCOSE METER
EACH MISCELLANEOUS
Qty: 1 KIT | Refills: 0 | Status: SHIPPED | OUTPATIENT
Start: 2017-09-29 | End: 2019-08-21

## 2017-09-29 NOTE — TELEPHONE ENCOUNTER
Prescription approved per [facility] Refill Protocol.  Madeleine Combs RN

## 2017-09-29 NOTE — TELEPHONE ENCOUNTER
Losartan 50mg      Last Written Prescription Date: 07/08/2016 #90 x 3  Last filled 06/09/2017  Last Office Visit with [facility] or [facility] prescribing provider: 07/10/2017 DAISY Jean       Potassium   Date Value Ref Range Status   07/10/2017 4.2 3.4 - 5.3 mmol/L Final     Creatinine   Date Value Ref Range Status   07/10/2017 0.76 0.66 - 1.25 mg/dL Final     BP Readings from Last 3 Encounters:   07/10/17 126/84   03/10/17 128/76   07/18/16 134/74

## 2017-09-29 NOTE — TELEPHONE ENCOUNTER
Routing refill request to provider for review/approval because:  Needs new Rx for meter    Madeleine Combs RN

## 2017-09-29 NOTE — TELEPHONE ENCOUNTER
Received fax from [facility] for Accu-Chek meter    Message: Alma requests new Rx: Pt needs new Accu-Chek [place] Plus meter as his meter has been acting up.

## 2017-10-24 ENCOUNTER — TELEPHONE (OUTPATIENT)
Dept: FAMILY MEDICINE | Facility: CLINIC | Age: 52
End: 2017-10-24

## 2017-10-24 DIAGNOSIS — Z79.4 CONTROLLED TYPE 2 DIABETES MELLITUS WITHOUT COMPLICATION, WITH LONG-TERM CURRENT USE OF INSULIN (H): ICD-10-CM

## 2017-10-24 DIAGNOSIS — E11.9 CONTROLLED TYPE 2 DIABETES MELLITUS WITHOUT COMPLICATION, WITH LONG-TERM CURRENT USE OF INSULIN (H): ICD-10-CM

## 2017-10-24 NOTE — TELEPHONE ENCOUNTER
Received a fax from [facility] Malcolm Ledesma 10/20    Pharmacy Comments: Alternative Requested: Patient states that he is taking 22 units daily - He needs a new scripts ASAP he only has one pen left, Thanks.

## 2017-10-24 NOTE — TELEPHONE ENCOUNTER
Prescription approved per [facility] Refill Protocol.  Krissy Kessler RN

## 2017-10-24 NOTE — TELEPHONE ENCOUNTER
Reason for Call:  Medication or medication refill:    Do you use a [facility]?  Name of the pharmacy and phone number for the current request:  See above    Name of the medication requested: humalog    Other request: patient is out of medication    Can we leave a detailed message on this number? YES    Phone number patient can be reached at: Home number on file [phone] (home)    Best Time:     Call taken on 10/24/2017 at 1:28 PM by Renetta Collins

## 2017-10-27 NOTE — TELEPHONE ENCOUNTER
Spoke to pharmacy and patient.  Current prescription for Humalog needs revision based on:    Pt currently takes 24 units of Lantus nightly  Pt uses a sliding scale, post meal at breakfast and dinner for Humalog dosing.  Equates to 2 units for every 10 over 150.  This typically means that pt takes apprioxmiatley 20 units per day.  As prescription is currently written, iinsurance will not cover additional refills.  Will reorder with new instructions and pend for your review and approval.    In 'media' tab the Endocrinology notes indicate similar sliding scale that pt describes today on the phone.    Magi RAMACHANDRAN RN

## 2017-10-30 DIAGNOSIS — Z79.4 CONTROLLED TYPE 2 DIABETES MELLITUS WITHOUT COMPLICATION, WITH LONG-TERM CURRENT USE OF INSULIN (H): ICD-10-CM

## 2017-10-30 DIAGNOSIS — E11.9 CONTROLLED TYPE 2 DIABETES MELLITUS WITHOUT COMPLICATION, WITH LONG-TERM CURRENT USE OF INSULIN (H): ICD-10-CM

## 2017-10-31 RX ORDER — GLIPIZIDE 10 MG/1
TABLET, FILM COATED, EXTENDED RELEASE ORAL
Qty: 30 TABLET | Refills: 0 | Status: SHIPPED | OUTPATIENT
Start: 2017-10-31 | End: 2017-11-30

## 2017-10-31 NOTE — TELEPHONE ENCOUNTER
Patient notified he is due for A1C  Patient verbalized understanding and agreed with this plan  Scheduled for lab only 11/1/17  Sent 1 month glipizide to pharmacy     Jerrica GAMBOA Rn

## 2017-11-01 DIAGNOSIS — E11.9 CONTROLLED TYPE 2 DIABETES MELLITUS WITHOUT COMPLICATION, WITH LONG-TERM CURRENT USE OF INSULIN (H): ICD-10-CM

## 2017-11-01 DIAGNOSIS — Z79.4 CONTROLLED TYPE 2 DIABETES MELLITUS WITHOUT COMPLICATION, WITH LONG-TERM CURRENT USE OF INSULIN (H): ICD-10-CM

## 2017-11-01 LAB — HBA1C MFR BLD: 8.2 % (ref 4.3–6)

## 2017-11-01 PROCEDURE — 83036 HEMOGLOBIN GLYCOSYLATED A1C: CPT | Performed by: FAMILY MEDICINE

## 2017-11-01 PROCEDURE — 36415 COLL VENOUS BLD VENIPUNCTURE: CPT | Performed by: FAMILY MEDICINE

## 2017-11-01 NOTE — LETTER
November 3, 2017      Stewart Turcios  [address] [address]        Stewart,    The A1c, a 3 month average of your blood sugars, is still up at 8.2. Ideally your A1c would be between 7.0 and 7.5.     Please call with any questions or concerns.     Resulted Orders   **A1C FUTURE anytime   Result Value Ref Range    Hemoglobin A1C 8.2 (H) 4.3 - 6.0 %       If you have any questions or concerns, please call the clinic at the number listed above.       Sincerely,    Dr. Anahi Jean MD/ag

## 2017-11-11 DIAGNOSIS — E11.9 CONTROLLED TYPE 2 DIABETES MELLITUS WITHOUT COMPLICATION, UNSPECIFIED LONG TERM INSULIN USE STATUS: ICD-10-CM

## 2017-11-14 NOTE — TELEPHONE ENCOUNTER
Prescription approved per [facility] Refill Protocol.  Krissy Kessler RN

## 2017-11-17 DIAGNOSIS — N52.2 DRUG-INDUCED ERECTILE DYSFUNCTION: ICD-10-CM

## 2017-11-17 NOTE — TELEPHONE ENCOUNTER
Routing refill request to provider for review/approval because:  Drug interaction warning: flomax    Madeleine Combs RN

## 2017-11-19 RX ORDER — SILDENAFIL CITRATE 20 MG/1
TABLET ORAL
Qty: 30 TABLET | Refills: 1 | Status: SHIPPED | OUTPATIENT
Start: 2017-11-19 | End: 2018-02-12

## 2017-11-30 DIAGNOSIS — E11.9 CONTROLLED TYPE 2 DIABETES MELLITUS WITHOUT COMPLICATION, WITH LONG-TERM CURRENT USE OF INSULIN (H): ICD-10-CM

## 2017-11-30 DIAGNOSIS — Z79.4 CONTROLLED TYPE 2 DIABETES MELLITUS WITHOUT COMPLICATION, WITH LONG-TERM CURRENT USE OF INSULIN (H): ICD-10-CM

## 2017-11-30 RX ORDER — GLIPIZIDE 10 MG/1
TABLET, FILM COATED, EXTENDED RELEASE ORAL
Qty: 90 TABLET | Refills: 1 | Status: SHIPPED | OUTPATIENT
Start: 2017-11-30 | End: 2018-05-31

## 2017-11-30 NOTE — TELEPHONE ENCOUNTER
Prescription approved per [facility] Refill Protocol or patient Primary care provider (PCP)  TARA Diaz RN/Malcolm Ledesma

## 2017-12-21 DIAGNOSIS — E11.9 CONTROLLED TYPE 2 DIABETES MELLITUS WITHOUT COMPLICATION, UNSPECIFIED LONG TERM INSULIN USE STATUS: ICD-10-CM

## 2018-02-01 ENCOUNTER — ALLIED HEALTH/NURSE VISIT (OUTPATIENT)
Dept: FAMILY MEDICINE | Facility: CLINIC | Age: 53
End: 2018-02-01
Payer: COMMERCIAL

## 2018-02-01 DIAGNOSIS — Z23 NEED FOR PROPHYLACTIC VACCINATION AND INOCULATION AGAINST INFLUENZA: Primary | ICD-10-CM

## 2018-02-01 PROCEDURE — 99207 ZZC NO CHARGE NURSE ONLY: CPT

## 2018-02-01 PROCEDURE — 90686 IIV4 VACC NO PRSV 0.5 ML IM: CPT

## 2018-02-01 PROCEDURE — 90471 IMMUNIZATION ADMIN: CPT

## 2018-02-01 NOTE — PROGRESS NOTES
Injectable Influenza Immunization Documentation    1.  Is the person to be vaccinated sick today?   No    2. Does the person to be vaccinated have an allergy to a component   of the vaccine?   No  Egg Allergy Algorithm Link    3. Has the person to be vaccinated ever had a serious reaction   to influenza vaccine in the past?   No    4. Has the person to be vaccinated ever had Guillain-Barré syndrome?   No    Form completed by Myla Christopher CMA([facility])

## 2018-02-01 NOTE — MR AVS SNAPSHOT
"              After Visit Summary   2/1/2018    Stewart Turcios    MRN: [id number]           Patient Information     Date Of Birth          1965        Visit Information        Provider Department      2/1/2018 9:30 AM Hem[facility]pn, [facility]        Today's Diagnoses     Need for prophylactic vaccination and inoculation against influenza    -  1       Follow-ups after your visit        Your next 10 appointments already scheduled     Feb 12, 2018  9:20 AM CST   SHORT with Anahi Jean MD   [facility] ([facility]    [address] [address]   [phone]              Who to contact     Normal or non-critical lab and imaging results will be communicated to you by MyChart, letter or phone within 4 business days after the clinic has received the results. If you do not hear from us within 7 days, please contact the clinic through MyChart or phone. If you have a critical or abnormal lab result, we will notify you by phone as soon as possible.  Submit refill requests through [employer] or call your pharmacy and they will forward the refill request to us. Please allow 3 business days for your refill to be completed.          If you need to speak with a  for additional information , please call: [phone]           Additional Information About Your Visit        [employer] Information     [employer] lets you send messages to your doctor, view your test results, renew your prescriptions, schedule appointments and more. To sign up, go to [email or web address] . Click on \"Log in\" on the left side of the screen, which will take you to the Welcome page. Then click on \"Sign up Now\" on the right side of the page.     You will be asked to enter the access code listed below, as well as some personal information. Please follow the directions to create your username and password.     Your access code is: [email or web address]  Expires: 5/2/2018 " 10:06 AM     Your access code will  in 90 days. If you need help or a new code, please call your [facility] clinic or [phone].        Care EveryWhere ID     This is your Care EveryWhere ID. This could be used by other organizations to access your [facility] medical records  [id number]         Blood Pressure from Last 3 Encounters:   07/10/17 126/84   03/10/17 128/76   16 134/74    Weight from Last 3 Encounters:   07/10/17 189 lb 4 oz (85.8 kg)   03/10/17 192 lb 8 oz (87.3 kg)   16 195 lb 2 oz (88.5 kg)              We Performed the Following     FLU VAC, SPLIT VIRUS IM > 3 YO (QUADRIVALENT) [39042]     Vaccine Administration, Initial [31386]        Primary Care Provider Office Phone # Fax #    Anahi Jean [phone] [address] [place] [address]        Equal Access to Services     [facility]: Hadii aad ku hadasho Soomaali, waaxda luqadaha, qaybta kaalmada adeegyada, waxay idiin hayaan adeeg kharablanca la'carin . So [facility] [phone].    ATENCIÓN: Si habla español, tiene a lindsay disposición servicios gratuitos de asistencia lingüística. Llame al [phone].    We comply with applicable federal civil rights laws and [place] laws. We do not discriminate on the basis of race, color, national origin, age, disability, sex, sexual orientation, or gender identity.            Thank you!     Thank you for choosing [facility] [place]  for your care. Our goal is always to provide you with excellent care. Hearing back from our patients is one way we can continue to improve our services. Please take a few minutes to complete the written survey that you may receive in the mail after your visit with us. Thank you!             Your Updated Medication List - Protect others around you: Learn how to safely use, store and throw away your medicines at www.disposemymeds.org.          This list is accurate as of 18 10:06 AM.  Always use your most recent med list.                    Brand Name Dispense Instructions for use Diagnosis    aspirin 81 MG tablet     100    1 tab po QD (Once per day)    Type II or unspecified type diabetes mellitus without mention of complication, not stated as uncontrolled       blood glucose lancing device     1 each    Device to be used with lancets.    Diabetes mellitus type 2, controlled, without complications (H)       blood glucose monitoring lancets     1 Box    Use to test blood sugar four times daily or as directed.    Diabetes mellitus type 2, controlled, without complications (H)       * blood glucose monitoring test strip    ACCU-CHEK SMARTVIEW    50 each    Use to test blood sugar two times daily or as directed.    Diabetes mellitus type 2, controlled, without complications (H)       * blood glucose monitoring test strip    ACCU-CHEK ANDER    300 each    Use to test blood sugars 4 times daily or as directed.    Uncontrolled type II diabetes mellitus (H)       * ACCU-CHEK SMARTVIEW test strip   Generic drug:  blood glucose monitoring     200 strip    USE TO TEST BLOOD SUGAR TWICE DAILY OR AS DIRECTED    Diabetes mellitus type 2, controlled, without complications (H)       * blood glucose monitoring test strip    no brand specified    300 strip    Use to test blood sugars 4 times daily or as directed    Controlled type 2 diabetes mellitus without complication, with long-term current use of insulin (H)       * BLOOD GLUCOSE TEST STRIPS STRP     qs    twice daily and prn    Type II or unspecified type diabetes mellitus without mention of complication, not stated as uncontrolled       * blood glucose monitoring meter device kit     1 kit    Use to test blood sugars four times daily or as directed.    Controlled type 2 diabetes mellitus without complication, with long-term current use of insulin (H)       glipiZIDE 10 MG 24 hr tablet    GLUCOTROL XL    90 tablet    TAKE 1 TABLET BY MOUTH ONCE DAILY    Controlled type 2 diabetes mellitus without  complication, with long-term current use of insulin (H)       insulin glargine 100 UNIT/ML injection    LANTUS SOLOSTAR    6 mL    Inject 24 Units Subcutaneous At Bedtime    Controlled type 2 diabetes mellitus without complication, with long-term current use of insulin (H)       insulin lispro 100 UNIT/ML injection    HumaLOG KWIKpen    18 mL    Per sliding scale before breakfast and dinner, up to 20 units per day.    Controlled type 2 diabetes mellitus without complication, with long-term current use of insulin (H)       * insulin pen needle 31G X 8 MM    B-D U/F    100 each    As directed.    Uncontrolled type II diabetes mellitus (H)       * insulin pen needle 32G X 4 MM    BD MUSA U/F    200 each    Use 4 times daily or as directed.    Controlled type 2 diabetes mellitus without complication, with long-term current use of insulin (H)       losartan 50 MG tablet    COZAAR    90 tablet    TAKE 1 TABLET (50 MG) BY MOUTH DAILY    Essential hypertension with goal blood pressure less than 140/90       metFORMIN 500 MG tablet    GLUCOPHAGE    180 tablet    Take 2 tablets (1,000 mg) by mouth 2 times daily (with meals)    Controlled type 2 diabetes mellitus without complication, unspecified long term insulin use status (H)       MULTIVITAMIN PO           * sildenafil 20 MG tablet    REVATIO    90 tablet    Take 1 tablet (20 mg) by mouth once as needed    Drug-induced erectile dysfunction       * sildenafil 20 MG tablet    REVATIO    30 tablet    TAKE 1TAB AS NEEDED FOR PULMONARY HYPERTENSION.NEVER USE W/NITROGLYCERIN TERAZOSIN OR DOXAZOSIN    Drug-induced erectile dysfunction       simvastatin 10 MG tablet    ZOCOR    90 tablet    TAKE 1 TABLET AT BEDTIME    Hyperlipidemia LDL goal <100       tamsulosin 0.4 MG capsule    FLOMAX    90 capsule    Take 1 capsule (0.4 mg) by mouth daily    Increased frequency of urination       VITAMIN D3 PO      Take 2,000 Units by mouth daily        * Notice:  This list has 10  medication(s) that are the same as other medications prescribed for you. Read the directions carefully, and ask your doctor or other care provider to review them with you.

## 2018-02-05 NOTE — PROGRESS NOTES
SUBJECTIVE:   Stewart Turcios is a 52 year old male who presents to clinic today for the following health issues:      Diabetes Follow-up  Metformin 1,000mg bid, glipizide 10mg qd, Insulin - Humalog and Lantus  Patient is checking blood sugars: twice daily.    Blood sugar testing frequency justification: Uncontrolled diabetes  Results are as follows:         am - 220s         suppertime - 80-120s    Diabetic concerns: None     Symptoms of hypoglycemia (low blood sugar): none     Paresthesias (numbness or burning in feet) or sores: No     Date of last diabetic eye exam: 12/2017    Hyperlipidemia Follow-Up  Simvastatin 10mg qd    Rate your low fat/cholesterol diet?: not monitoring fat    Taking statin?  Yes, no muscle aches from statin    Other lipid medications/supplements?:  none    Hypertension Follow-up  Losartan 50mg qd    Outpatient blood pressures are not being checked.    Low Salt Diet: not monitoring salt    BP Readings from Last 2 Encounters:   07/10/17 126/84   03/10/17 128/76     Hemoglobin A1C (%)   Date Value   11/01/2017 8.2 (H)   07/10/2017 8.0 (H)     LDL Cholesterol Calculated (mg/dL)   Date Value   05/12/2016 84   05/22/2015 81     LDL Cholesterol Direct (mg/dL)   Date Value   07/10/2017 76     Medication Followup of erectile dysfunction  Sildenafil 20mg prn    Taking Medication as prescribed: yes    Side Effects:  None    Medication Helping Symptoms:  yes     - Patient does drink at night and he eats a good amount of food at night. He states his     Amount of exercise or physical activity: 2-3 days/week    Problems taking medications regularly: No    Medication side effects: none    Diet: regular (no restrictions)      ROS:  Constitutional, HEENT, cardiovascular, pulmonary, gi and gu systems are negative, except as otherwise noted.    This document serves as a record of the services and decisions personally performed and made by Anahi Jean MD. It was created on his behalf by Mannie Mac  "a trained medical scribe. The creation of this document is based the provider's statements to the medical scribe.  Mannie Mac 10:01 AM February 12, 2018  OBJECTIVE:   /70  Pulse 76  Ht 5' 11\" (1.803 m)  Wt 193 lb 8 oz (87.8 kg)  BMI 26.99 kg/m2  Body mass index is 26.99 kg/(m^2).       GENERAL: healthy, alert and no distress  EYES: Eyes grossly normal to inspection, conjunctivae and sclerae normal  RESP: lungs clear to auscultation - no rales, rhonchi or wheezes  CV: regular rate and rhythm, normal S1 S2, no murmur  ABDOMEN: soft, nontender  MS: no gross musculoskeletal defects noted, no edema  SKIN: no suspicious lesions or rashes  NEURO: Normal strength and tone, mentation intact and speech normal  PSYCH: mentation appears normal, affect normal/bright    Lab Results   Component Value Date    A1C 9.0 02/12/2018    A1C 8.2 11/01/2017    A1C 8.0 07/10/2017    A1C 8.1 03/10/2017    A1C 8.7 07/07/2016     ASSESSMENT/PLAN:     (E11.9,  Z79.4) Controlled type 2 diabetes mellitus without complication, with long-term current use of insulin (H)  (primary encounter diagnosis)  Comment: A1c is 9.0 today. Patient is on long and short acting insulin as well as 2 oral medications. Patient will focus on lifestyle modifications and recheck A1c is 3 months. Patient is in agreement for new medication if A1c is not improved in follow up visit.    Plan: Hemoglobin A1c, Albumin Random Urine         Quantitative with Creat Ratio            Patient Instructions   *   I agree with cutting down on the alcohol.     *   For now, no change in medications.     *   Come back in 3 months, if the A1c is still > 8.0, we'll start Victoza.       Patient will follow up in 3 months or sooner, PRN. Patient instructed to call with any questions or concerns.     total time spent with pt. was 25 minutes, 20 minutes of the visit was spent discussing the above issues, including pathophysiology, treatment options, and expected outcomes. "     The information in this document, created by a scribe for me, accurately reflects the services I personally performed and the decisions made by me. I have reviewed and approved this document for accuracy. 10:02 AM 2/12/2018    Anahi Jean MD  [facility]

## 2018-02-12 ENCOUNTER — OFFICE VISIT (OUTPATIENT)
Dept: FAMILY MEDICINE | Facility: CLINIC | Age: 53
End: 2018-02-12
Payer: COMMERCIAL

## 2018-02-12 VITALS
WEIGHT: 193.5 LBS | BODY MASS INDEX: 27.09 KG/M2 | SYSTOLIC BLOOD PRESSURE: 128 MMHG | DIASTOLIC BLOOD PRESSURE: 70 MMHG | HEIGHT: 71 IN | HEART RATE: 76 BPM

## 2018-02-12 DIAGNOSIS — Z79.4 CONTROLLED TYPE 2 DIABETES MELLITUS WITHOUT COMPLICATION, WITH LONG-TERM CURRENT USE OF INSULIN (H): Primary | ICD-10-CM

## 2018-02-12 DIAGNOSIS — E11.9 CONTROLLED TYPE 2 DIABETES MELLITUS WITHOUT COMPLICATION, WITH LONG-TERM CURRENT USE OF INSULIN (H): Primary | ICD-10-CM

## 2018-02-12 LAB — HBA1C MFR BLD: 9 % (ref 4.3–6)

## 2018-02-12 PROCEDURE — 99214 OFFICE O/P EST MOD 30 MIN: CPT | Performed by: FAMILY MEDICINE

## 2018-02-12 PROCEDURE — 36415 COLL VENOUS BLD VENIPUNCTURE: CPT | Performed by: FAMILY MEDICINE

## 2018-02-12 PROCEDURE — 83036 HEMOGLOBIN GLYCOSYLATED A1C: CPT | Performed by: FAMILY MEDICINE

## 2018-02-12 RX ORDER — LIRAGLUTIDE 6 MG/ML
1.2 INJECTION SUBCUTANEOUS DAILY
Qty: 9 ML | Refills: 3 | Status: SHIPPED | OUTPATIENT
Start: 2018-02-12 | End: 2018-11-07

## 2018-02-12 NOTE — MR AVS SNAPSHOT
"              After Visit Summary   2/12/2018    Stewart Turcios    MRN: [id number]           Patient Information     Date Of Birth          1965        Visit Information        Provider Department      2/12/2018 9:20 AM Anahi Jean MD [facility]        Today's Diagnoses     Controlled type 2 diabetes mellitus without complication, with long-term current use of insulin (H)    -  1      Care Instructions    *   I agree with cutting down on the alcohol.     *   For now, no change in medications.     *   Come back in 3 months, if the A1c is still > 8.0, we'll start Victoza.           Follow-ups after your visit        Who to contact     Normal or non-critical lab and imaging results will be communicated to you by [employer] of ushart, letter or phone within 4 business days after the clinic has received the results. If you do not hear from us within 7 days, please contact the clinic through [employer] for the rest of ushart or phone. If you have a critical or abnormal lab result, we will notify you by phone as soon as possible.  Submit refill requests through [employer] or call your pharmacy and they will forward the refill request to us. Please allow 3 business days for your refill to be completed.          If you need to speak with a  for additional information , please call: [phone]           Additional Information About Your Visit        [employer] Information     [employer] lets you send messages to your doctor, view your test results, renew your prescriptions, schedule appointments and more. To sign up, go to [email or web address] . Click on \"Log in\" on the left side of the screen, which will take you to the Welcome page. Then click on \"Sign up Now\" on the right side of the page.     You will be asked to enter the access code listed below, as well as some personal information. Please follow the directions to create your username and password.     Your access code is: [email or web address]  Expires: 5/2/2018 10:06 " "AM     Your access code will  in 90 days. If you need help or a new code, please call your [facility] clinic or [phone].        Care EveryWhere ID     This is your Care EveryWhere ID. This could be used by other organizations to access your [facility] medical records  [id number]        Your Vitals Were     Pulse Height BMI (Body Mass Index)             76 5' 11\" (1.803 m) 26.99 kg/m2          Blood Pressure from Last 3 Encounters:   18 128/70   07/10/17 126/84   03/10/17 128/76    Weight from Last 3 Encounters:   18 193 lb 8 oz (87.8 kg)   07/10/17 189 lb 4 oz (85.8 kg)   03/10/17 192 lb 8 oz (87.3 kg)              We Performed the Following     Albumin Random Urine Quantitative with Creat Ratio     Hemoglobin A1c          Today's Medication Changes          These changes are accurate as of 18 10:24 AM.  If you have any questions, ask your nurse or doctor.               Start taking these medicines.        Dose/Directions    liraglutide 18 MG/3ML soln   Commonly known as:  VICTOZA   Used for:  Controlled type 2 diabetes mellitus without complication, with long-term current use of insulin (H)   Started by:  Anahi Jean MD        Dose:  1.2 mg   Inject 1.2 mg Subcutaneous daily   Quantity:  9 mL   Refills:  3         These medicines have changed or have updated prescriptions.        Dose/Directions    sildenafil 20 MG tablet   Commonly known as:  REVATIO   This may have changed:  Another medication with the same name was removed. Continue taking this medication, and follow the directions you see here.   Used for:  Drug-induced erectile dysfunction   Changed by:  Anahi Jean MD        Dose:  20 mg   Take 1 tablet (20 mg) by mouth once as needed   Quantity:  90 tablet   Refills:  1         Stop taking these medicines if you haven't already. Please contact your care team if you have questions.     tamsulosin 0.4 MG capsule   Commonly known as:  FLOMAX   Stopped by:  Anahi Jean" MD Chao                Where to get your medicines      These medications were sent to [facility] [place] - [place], [place] - [address]  [address] [address]     Phone:  [phone]     liraglutide 18 MG/3ML soln                Primary Care Provider Office Phone # Fax #    Anahi Chao Jean [phone] [address]  [place] [facility] [address]        Equal Access to Services     [facility]: Hadii aad ku hadasho Soomaali, waaxda luqadaha, qaybta kaalmada adeegyada, waxay idiin hayaan adeeg kharash la'aan . So [facility] [phone].    ATENCIÓN: Si habla español, tiene a lindsay disposición servicios gratuitos de asistencia lingüística. [facility] [phone].    We comply with applicable federal civil rights laws and [place] laws. We do not discriminate on the basis of race, color, national origin, age, disability, sex, sexual orientation, or gender identity.            Thank you!     Thank you for choosing [facility]  for your care. Our goal is always to provide you with excellent care. Hearing back from our patients is one way we can continue to improve our services. Please take a few minutes to complete the written survey that you may receive in the mail after your visit with us. Thank you!             Your Updated Medication List - Protect others around you: Learn how to safely use, store and throw away your medicines at www.disposemymeds.org.          This list is accurate as of 2/12/18 10:24 AM.  Always use your most recent med list.                   Brand Name Dispense Instructions for use Diagnosis    aspirin 81 MG tablet     100    1 tab po QD (Once per day)    Type II or unspecified type diabetes mellitus without mention of complication, not stated as uncontrolled       blood glucose lancing device     1 each    Device to be used with lancets.    Diabetes mellitus type 2, controlled, without complications (H)       blood glucose  monitoring lancets     1 Box    Use to test blood sugar four times daily or as directed.    Diabetes mellitus type 2, controlled, without complications (H)       * blood glucose monitoring test strip    ACCU-CHEK SMARTVIEW    50 each    Use to test blood sugar two times daily or as directed.    Diabetes mellitus type 2, controlled, without complications (H)       * blood glucose monitoring test strip    ACCU-CHEK ANDER    300 each    Use to test blood sugars 4 times daily or as directed.    Uncontrolled type II diabetes mellitus (H)       * ACCU-CHEK SMARTVIEW test strip   Generic drug:  blood glucose monitoring     200 strip    USE TO TEST BLOOD SUGAR TWICE DAILY OR AS DIRECTED    Diabetes mellitus type 2, controlled, without complications (H)       * blood glucose monitoring test strip    no brand specified    300 strip    Use to test blood sugars 4 times daily or as directed    Controlled type 2 diabetes mellitus without complication, with long-term current use of insulin (H)       * BLOOD GLUCOSE TEST STRIPS STRP     qs    twice daily and prn    Type II or unspecified type diabetes mellitus without mention of complication, not stated as uncontrolled       * blood glucose monitoring meter device kit     1 kit    Use to test blood sugars four times daily or as directed.    Controlled type 2 diabetes mellitus without complication, with long-term current use of insulin (H)       glipiZIDE 10 MG 24 hr tablet    GLUCOTROL XL    90 tablet    TAKE 1 TABLET BY MOUTH ONCE DAILY    Controlled type 2 diabetes mellitus without complication, with long-term current use of insulin (H)       insulin glargine 100 UNIT/ML injection    LANTUS SOLOSTAR    6 mL    Inject 24 Units Subcutaneous At Bedtime    Controlled type 2 diabetes mellitus without complication, with long-term current use of insulin (H)       insulin lispro 100 UNIT/ML injection    HumaLOG KWIKpen    18 mL    Per sliding scale before breakfast and dinner, up to 20  units per day.    Controlled type 2 diabetes mellitus without complication, with long-term current use of insulin (H)       * insulin pen needle 31G X 8 MM    B-D U/F    100 each    As directed.    Uncontrolled type II diabetes mellitus (H)       * insulin pen needle 32G X 4 MM    BD MUSA U/F    200 each    Use 4 times daily or as directed.    Controlled type 2 diabetes mellitus without complication, with long-term current use of insulin (H)       liraglutide 18 MG/3ML soln    VICTOZA    9 mL    Inject 1.2 mg Subcutaneous daily    Controlled type 2 diabetes mellitus without complication, with long-term current use of insulin (H)       losartan 50 MG tablet    COZAAR    90 tablet    TAKE 1 TABLET (50 MG) BY MOUTH DAILY    Essential hypertension with goal blood pressure less than 140/90       metFORMIN 500 MG tablet    GLUCOPHAGE    180 tablet    Take 2 tablets (1,000 mg) by mouth 2 times daily (with meals)    Controlled type 2 diabetes mellitus without complication, unspecified long term insulin use status (H)       MULTIVITAMIN PO           sildenafil 20 MG tablet    REVATIO    90 tablet    Take 1 tablet (20 mg) by mouth once as needed    Drug-induced erectile dysfunction       simvastatin 10 MG tablet    ZOCOR    90 tablet    TAKE 1 TABLET AT BEDTIME    Hyperlipidemia LDL goal <100       VITAMIN D3 PO      Take 2,000 Units by mouth daily        * Notice:  This list has 8 medication(s) that are the same as other medications prescribed for you. Read the directions carefully, and ask your doctor or other care provider to review them with you.

## 2018-02-12 NOTE — PATIENT INSTRUCTIONS
*   I agree with cutting down on the alcohol.     *   For now, no change in medications.     *   Come back in 3 months, if the A1c is still > 8.0, we'll start Victoza.

## 2018-02-12 NOTE — NURSING NOTE
"Chief Complaint   Patient presents with     Diabetes       Initial /70  Pulse 76  Ht 5' 11\" (1.803 m)  Wt 193 lb 8 oz (87.8 kg)  BMI 26.99 kg/m2 Estimated body mass index is 26.99 kg/(m^2) as calculated from the following:    Height as of this encounter: 5' 11\" (1.803 m).    Weight as of this encounter: 193 lb 8 oz (87.8 kg).  Medication Reconciliation: complete  "

## 2018-03-18 DIAGNOSIS — Z79.4 CONTROLLED TYPE 2 DIABETES MELLITUS WITHOUT COMPLICATION, WITH LONG-TERM CURRENT USE OF INSULIN (H): ICD-10-CM

## 2018-03-18 DIAGNOSIS — E11.9 CONTROLLED TYPE 2 DIABETES MELLITUS WITHOUT COMPLICATION, WITH LONG-TERM CURRENT USE OF INSULIN (H): ICD-10-CM

## 2018-03-19 NOTE — TELEPHONE ENCOUNTER
"Requested Prescriptions   Pending Prescriptions Disp Refills     BD MUSA U/F 32G X 4 MM insulin pen needle [Pharmacy Med Name: BD ULTRA-FINE PEN NDL [id number]]  1    Last Written Prescription Date:  12/21/2016 #200 x 3  Last filled 12/15/2017  Last office visit: 2/12/2018 [facility]   Future Office Visit:  None   Sig: USE 4 TIMES DAILY OR AS DIRECTED.    Diabetic Supplies Protocol Passed    3/18/2018  2:23 PM       Passed - Patient is 18 years of age or older       Passed - Recent (6 mo) or future (30 days) visit within the authorizing provider's specialty    Patient had office visit in the last 6 months or has a visit in the next 30 days with authorizing provider.  See \"Patient Info\" tab in inbasket, or \"Choose Columns\" in Meds & Orders section of the refill encounter.            HUMALOG KWIKPEN 100 UNIT/ML soln [Pharmacy Med Name: HUMALOG 100 UNITS/ML KWIKPEN]  2    Last Written Prescription Date:  10/28/2017 #18ml x 2  Last filled 02/19/2018  Last office visit: 2/12/2018 [facility]   Future Office Visit: None   Sig: PER SLIDING SCALE BEFORE BREAKFAST AND DINNER, UP TO 20 UNITS PER DAY.    Short Acting Insulin Protocol Failed    3/18/2018  2:23 PM       Failed - Microalbumin on file in past 12 months    Recent Labs   Lab Test  05/22/15   1320 04/20/15   MICROALB   --   <5.0   MICROL  7   --    UMALCR  7.46  --            Passed - Blood pressure less than 140/90 in past 6 months    BP Readings from Last 3 Encounters:   02/12/18 128/70   07/10/17 126/84   03/10/17 128/76                Passed - LDL on file in past 12 months    Recent Labs   Lab Test  07/10/17   1450   LDL  76            Passed - Serum creatinine on file in past 12 months    Recent Labs   Lab Test  07/10/17   1450   CR  0.76            Passed - HgbA1C in past 3 or 6 months    Recent Labs   Lab Test  02/12/18   0954   A1C  9.0*            Passed - Patient is age 18 or older       Passed - Recent (6 mo) or future (30 days) visit within the authorizing " "provider's specialty    Patient had office visit in the last 6 months or has a visit in the next 30 days with authorizing provider or within the authorizing provider's specialty.  See \"Patient Info\" tab in inbasket, or \"Choose Columns\" in Meds & Orders section of the refill encounter.              "

## 2018-03-21 RX ORDER — INSULIN LISPRO 100 [IU]/ML
INJECTION, SOLUTION INTRAVENOUS; SUBCUTANEOUS
Qty: 45 ML | Refills: 2 | Status: SHIPPED | OUTPATIENT
Start: 2018-03-21 | End: 2019-05-11

## 2018-03-21 RX ORDER — PEN NEEDLE, DIABETIC 32GX 5/32"
NEEDLE, DISPOSABLE MISCELLANEOUS
Qty: 360 EACH | Refills: 3 | Status: SHIPPED | OUTPATIENT
Start: 2018-03-21 | End: 2018-11-20

## 2018-03-21 NOTE — TELEPHONE ENCOUNTER
Prescription approved per [facility] [place] Protocol or patient Primary care provider (PCP)  TARA Diaz RN/Malcolm Ledesma

## 2018-04-21 DIAGNOSIS — I10 ESSENTIAL HYPERTENSION WITH GOAL BLOOD PRESSURE LESS THAN 140/90: ICD-10-CM

## 2018-04-23 RX ORDER — LOSARTAN POTASSIUM 50 MG/1
TABLET ORAL
Qty: 90 TABLET | Refills: 3 | Status: SHIPPED | OUTPATIENT
Start: 2018-04-23 | End: 2019-05-11

## 2018-04-23 NOTE — TELEPHONE ENCOUNTER
"Requested Prescriptions   Pending Prescriptions Disp Refills     losartan (COZAAR) 50 MG tablet [Pharmacy Med Name: LOSARTAN POTASSIUM 50 MG TAB] 90 tablet 1    Last Written Prescription Date:  09/29/2017 #90 x 1  Last filled 12/30/2017  Last office visit: 2/12/2018 [facility]   Future Office Visit:  None   Sig: TAKE 1 TABLET (50 MG) BY MOUTH DAILY    Angiotensin-II Receptors Passed    4/21/2018  9:38 AM       Passed - Blood pressure under 140/90 in past 12 months    BP Readings from Last 3 Encounters:   02/12/18 128/70   07/10/17 126/84   03/10/17 128/76                Passed - Recent (12 mo) or future (30 days) visit within the authorizing provider's specialty    Patient had office visit in the last 12 months or has a visit in the next 30 days with authorizing provider or within the authorizing provider's specialty.  See \"Patient Info\" tab in inbasket, or \"Choose Columns\" in Meds & Orders section of the refill encounter.           Passed - Patient is age 18 or older       Passed - Normal serum creatinine on file in past 12 months    Recent Labs   Lab Test  07/10/17   1450   CR  0.76            Passed - Normal serum potassium on file in past 12 months    Recent Labs   Lab Test  07/10/17   1450   POTASSIUM  4.2                      "

## 2018-04-24 NOTE — PROGRESS NOTES
SUBJECTIVE:   Stewart Turcios is a 52 year old male who presents to clinic today for the following health issues:      Diabetes Follow-up  Glipizide XL 10mg qd, insulin - Lantus and Humalog, Victoza 1.2mg SubQ qd, metformin 1,000mg bid  Patient is checking blood sugars: twice daily.    Blood sugar testing frequency justification: Uncontrolled diabetes  Results are as follows:         44-220s    Diabetic concerns: None     Symptoms of hypoglycemia (low blood sugar): shaky, weak     Paresthesias (numbness or burning in feet) or sores: No     Date of last diabetic eye exam: 12/2017    He says his day sugars are doing well and his morning sugar still varies widely. He uses about 20 units of short acting insulin and about 22 units of long acting.    Hyperlipidemia Follow-Up  Simvastatin 10mg qhs    Rate your low fat/cholesterol diet?: not monitoring fat    Taking statin?  Yes, no muscle aches from statin    Other lipid medications/supplements?:  none    Hypertension Follow-up  Losartan 50mg qd    Outpatient blood pressures are not being checked.    Low Salt Diet: not monitoring salt    BP Readings from Last 2 Encounters:   02/12/18 128/70   07/10/17 126/84     Hemoglobin A1C (%)   Date Value   02/12/2018 9.0 (H)   11/01/2017 8.2 (H)     LDL Cholesterol Calculated (mg/dL)   Date Value   05/12/2016 84   05/22/2015 81     LDL Cholesterol Direct (mg/dL)   Date Value   07/10/2017 76     - Intermittent left sided arm pain x2 days. He has had no chest pain, vision or speech changes, LOC/falls or sweating.      Problems taking medications regularly: No    Medication side effects: none    Diet: regular (no restrictions)          ROS:  Constitutional, HEENT, cardiovascular, pulmonary, gi and gu systems are negative, except as otherwise noted.    This document serves as a record of the services and decisions personally performed and made by Anahi Jean MD. It was created on his behalf by Mannie Mac, a trained medical  "scribe. The creation of this document is based the provider's statements to the medical scribe.  Mannie Mac 3:23 PM April 25, 2018  OBJECTIVE:   /82  Pulse 77  Ht 5' 11\" (1.803 m)  Wt 191 lb (86.6 kg)  BMI 26.64 kg/m2  Body mass index is 26.64 kg/(m^2).       GENERAL: healthy, alert and no distress  EYES: Eyes grossly normal to inspection, conjunctivae and sclerae normal  RESP: lungs clear to auscultation - no rales, rhonchi or wheezes  CV: regular rate and rhythm, normal S1 S2, no murmur  MS: no gross musculoskeletal defects noted, no edema      Lab Results   Component Value Date    A1C 8.5 04/25/2018    A1C 9.0 02/12/2018    A1C 8.2 11/01/2017    A1C 8.0 07/10/2017    A1C 8.1 03/10/2017     ASSESSMENT/PLAN:     (M79.622) Pain of left upper arm  (primary encounter diagnosis)  Comment: EKG results normal. No symptoms with physical exertion. Likely secondary to muscular pain.   Plan: EKG 12-lead complete w/read - Clinics            (E11.65,  Z79.4) Uncontrolled type 2 diabetes mellitus without complication, with long-term current use of insulin (H)  Comment: A1c was 8.5 today, improved from previous visit. The patient's blood sugar readings are still varying within a wide range. He would like to continue with current medications. I advised the patient to increase his long acting insulin, to 26 units, and maintain his short acting insulin. Encouraged patient to continue with lifestyle modifications.   Plan: Albumin Random Urine Quantitative with Creat         Ratio            Patient Instructions   *   For high sugars at night, keep the short acting insulin the same routine, but increase the Lantus to 26 units.     *   Long term, to manage your blood sugars, will be increasing the Lantus and more exercise.     *   The arm pain is not your heart.     *    Your A1c is 8.5, down from 9.0.         Patient will follow up if symptoms worsen or do not improve. Patient instructed to call with any questions or " concerns.    The information in this document, created by a scribe for me, accurately reflects the services I personally performed and the decisions made by me. I have reviewed and approved this document for accuracy. 3:37 PM 4/25/2018      Anahi Jean MD  [facility]

## 2018-04-25 ENCOUNTER — OFFICE VISIT (OUTPATIENT)
Dept: FAMILY MEDICINE | Facility: CLINIC | Age: 53
End: 2018-04-25
Payer: COMMERCIAL

## 2018-04-25 VITALS
BODY MASS INDEX: 26.74 KG/M2 | DIASTOLIC BLOOD PRESSURE: 82 MMHG | WEIGHT: 191 LBS | SYSTOLIC BLOOD PRESSURE: 154 MMHG | HEART RATE: 77 BPM | HEIGHT: 71 IN

## 2018-04-25 DIAGNOSIS — M79.622 PAIN OF LEFT UPPER ARM: Primary | ICD-10-CM

## 2018-04-25 LAB — HBA1C MFR BLD: 8.5 % (ref 0–5.6)

## 2018-04-25 PROCEDURE — 99207 C FOOT EXAM  NO CHARGE: CPT | Performed by: FAMILY MEDICINE

## 2018-04-25 PROCEDURE — 83036 HEMOGLOBIN GLYCOSYLATED A1C: CPT | Performed by: FAMILY MEDICINE

## 2018-04-25 PROCEDURE — 99214 OFFICE O/P EST MOD 30 MIN: CPT | Performed by: FAMILY MEDICINE

## 2018-04-25 PROCEDURE — 93000 ELECTROCARDIOGRAM COMPLETE: CPT | Performed by: FAMILY MEDICINE

## 2018-04-25 PROCEDURE — 36415 COLL VENOUS BLD VENIPUNCTURE: CPT | Performed by: FAMILY MEDICINE

## 2018-04-25 NOTE — NURSING NOTE
"Chief Complaint   Patient presents with     Diabetes       Initial /82  Pulse 77  Ht 5' 11\" (1.803 m)  Wt 191 lb (86.6 kg)  BMI 26.64 kg/m2 Estimated body mass index is 26.64 kg/(m^2) as calculated from the following:    Height as of this encounter: 5' 11\" (1.803 m).    Weight as of this encounter: 191 lb (86.6 kg).  Medication Reconciliation: complete  "

## 2018-04-25 NOTE — PATIENT INSTRUCTIONS
*   For high sugars at night, keep the short acting insulin the same routine, but increase the Lantus to 26 units.     *   Long term, to manage your blood sugars, will be increasing the Lantus and more exercise.     *   The arm pain is not your heart.     *    Your A1c is 8.5, down from 9.0.

## 2018-04-25 NOTE — MR AVS SNAPSHOT
"              After Visit Summary   4/25/2018    Stewart Turcios    MRN: [id number]           Patient Information     Date Of Birth          1965        Visit Information        Provider Department      4/25/2018 2:40 PM Anahi Jaen MD [facility] [address]        Today's Diagnoses     Pain of left upper arm    -  1    Uncontrolled type 2 diabetes mellitus without complication, with long-term current use of insulin (H)          Care Instructions    *   For high sugars at night, keep the short acting insulin the same routine, but increase the Lantus to 26 units.     *   Long term, to manage your blood sugars, will be increasing the Lantus and more exercise.     *   The arm pain is not your heart.     *    Your A1c is 8.5, down from 9.0.           Follow-ups after your visit        Who to contact     Normal or non-critical lab and imaging results will be communicated to you by Novasentishart, letter or phone within 4 business days after the clinic has received the results. If you do not hear from us within 7 days, please contact the clinic through Novasentishart or phone. If you have a critical or abnormal lab result, we will notify you by phone as soon as possible.  Submit refill requests through Triggertrap or call your pharmacy and they will forward the refill request to us. Please allow 3 business days for your refill to be completed.          If you need to speak with a  for additional information , please call: [phone]           Additional Information About Your Visit        Triggertrap Information     Triggertrap lets you send messages to your doctor, view your test results, renew your prescriptions, schedule appointments and more. To sign up, go to [email or web address] . Click on \"Log in\" on the left side of the screen, which will take you to the Welcome page. Then click on \"Sign up Now\" on the right side of the page.     You will be asked to enter the access code listed below, as well as " "some personal information. Please follow the directions to create your username and password.     Your access code is: [email or web address]  Expires: 2018 11:06 AM     Your access code will  in 90 days. If you need help or a new code, please call your [place] clinic or [phone].        Care EveryWhere ID     This is your Care EveryWhere ID. This could be used by other organizations to access your [place] medical records  [id number]        Your Vitals Were     Pulse Height BMI (Body Mass Index)             77 5' 11\" (1.803 m) 26.64 kg/m2          Blood Pressure from Last 3 Encounters:   18 154/82   18 128/70   07/10/17 126/84    Weight from Last 3 Encounters:   18 191 lb (86.6 kg)   18 193 lb 8 oz (87.8 kg)   07/10/17 189 lb 4 oz (85.8 kg)              We Performed the Following     Albumin Random Urine Quantitative with Creat Ratio     EKG 12-lead complete w/read - Clinics     FOOT EXAM     Hemoglobin A1c        Primary Care Provider Office Phone # Fax #    Anahi Jean [phone] [address] [place] [address]        Equal Access to Services     ANDREI JAVIER AH: Hadii aad ku hadasho Soomaali, waaxda luqadaha, qaybta kaalmada adeegyada, waxay idiin hayaan alexia khmarilin la'carin rodriguez. So [facility] [phone].    ATENCIÓN: Si habla español, tiene a lindsay disposición servicios gratuitos de asistencia lingüística. Llame al [phone].    We comply with applicable federal civil rights laws and [place] laws. We do not discriminate on the basis of race, color, national origin, age, disability, sex, sexual orientation, or gender identity.            Thank you!     Thank you for choosing [facility] [place]  for your care. Our goal is always to provide you with excellent care. Hearing back from our patients is one way we can continue to improve our services. Please take a few minutes to complete the written survey that you may receive in the mail after your " visit with us. Thank you!             Your Updated Medication List - Protect others around you: Learn how to safely use, store and throw away your medicines at www.disposemymeds.org.          This list is accurate as of 4/25/18  3:38 PM.  Always use your most recent med list.                   Brand Name Dispense Instructions for use Diagnosis    aspirin 81 MG tablet     100    1 tab po QD (Once per day)    Type II or unspecified type diabetes mellitus without mention of complication, not stated as uncontrolled       blood glucose lancing device     1 each    Device to be used with lancets.    Diabetes mellitus type 2, controlled, without complications (H)       blood glucose monitoring lancets     1 Box    Use to test blood sugar four times daily or as directed.    Diabetes mellitus type 2, controlled, without complications (H)       * blood glucose monitoring test strip    ACCU-CHEK SMARTVIEW    50 each    Use to test blood sugar two times daily or as directed.    Diabetes mellitus type 2, controlled, without complications (H)       * blood glucose monitoring test strip    ACCU-CHEK ANDER    300 each    Use to test blood sugars 4 times daily or as directed.    Uncontrolled type II diabetes mellitus (H)       * ACCU-CHEK SMARTVIEW test strip   Generic drug:  blood glucose monitoring     200 strip    USE TO TEST BLOOD SUGAR TWICE DAILY OR AS DIRECTED    Diabetes mellitus type 2, controlled, without complications (H)       * blood glucose monitoring test strip    no brand specified    300 strip    Use to test blood sugars 4 times daily or as directed    Controlled type 2 diabetes mellitus without complication, with long-term current use of insulin (H)       * BLOOD GLUCOSE TEST STRIPS STRP     qs    twice daily and prn    Type II or unspecified type diabetes mellitus without mention of complication, not stated as uncontrolled       * blood glucose monitoring meter device kit     1 kit    Use to test blood sugars four  times daily or as directed.    Controlled type 2 diabetes mellitus without complication, with long-term current use of insulin (H)       glipiZIDE 10 MG 24 hr tablet    GLUCOTROL XL    90 tablet    TAKE 1 TABLET BY MOUTH ONCE DAILY    Controlled type 2 diabetes mellitus without complication, with long-term current use of insulin (H)       HumaLOG KWIKpen 100 UNIT/ML injection   Generic drug:  insulin lispro     45 mL    PER SLIDING SCALE BEFORE BREAKFAST AND DINNER, UP TO 20 UNITS PER DAY.    Controlled type 2 diabetes mellitus without complication, with long-term current use of insulin (H)       insulin glargine 100 UNIT/ML injection    LANTUS SOLOSTAR    6 mL    Inject 24 Units Subcutaneous At Bedtime    Controlled type 2 diabetes mellitus without complication, with long-term current use of insulin (H)       * insulin pen needle 31G X 8 MM    B-D U/F    100 each    As directed.    Uncontrolled type II diabetes mellitus (H)       * BD MUSA U/F 32G X 4 MM   Generic drug:  insulin pen needle     360 each    USE 4 TIMES DAILY OR AS DIRECTED.    Controlled type 2 diabetes mellitus without complication, with long-term current use of insulin (H)       liraglutide 18 MG/3ML soln    VICTOZA    9 mL    Inject 1.2 mg Subcutaneous daily    Controlled type 2 diabetes mellitus without complication, with long-term current use of insulin (H)       losartan 50 MG tablet    COZAAR    90 tablet    TAKE 1 TABLET (50 MG) BY MOUTH DAILY    Essential hypertension with goal blood pressure less than 140/90       metFORMIN 500 MG tablet    GLUCOPHAGE    180 tablet    Take 2 tablets (1,000 mg) by mouth 2 times daily (with meals)    Controlled type 2 diabetes mellitus without complication, unspecified long term insulin use status (H)       MULTIVITAMIN PO           sildenafil 20 MG tablet    REVATIO    90 tablet    Take 1 tablet (20 mg) by mouth once as needed    Drug-induced erectile dysfunction       simvastatin 10 MG tablet    ZOCOR    90  tablet    TAKE 1 TABLET AT BEDTIME    Hyperlipidemia LDL goal <100       VITAMIN D3 PO      Take 2,000 Units by mouth daily        * Notice:  This list has 8 medication(s) that are the same as other medications prescribed for you. Read the directions carefully, and ask your doctor or other care provider to review them with you.

## 2018-05-29 DIAGNOSIS — E11.9 DIABETES MELLITUS TYPE 2, CONTROLLED, WITHOUT COMPLICATIONS (H): ICD-10-CM

## 2018-05-30 RX ORDER — BLOOD SUGAR DIAGNOSTIC
STRIP MISCELLANEOUS
Qty: 200 STRIP | Refills: 3 | Status: SHIPPED | OUTPATIENT
Start: 2018-05-30 | End: 2019-08-21

## 2018-05-30 NOTE — TELEPHONE ENCOUNTER
Prescription approved per [facility] Refill Protocol or patient Primary care provider (PCP)  TARA Diaz RN/Malcolm Ledesma

## 2018-05-30 NOTE — TELEPHONE ENCOUNTER
"Requested Prescriptions   Pending Prescriptions Disp Refills     ACCU-CHEK SMARTVIEW test strip [Pharmacy Med Name: ACCU-CHEK SMARTVIEW TEST STRIP] 200 strip 0    Last Written Prescription Date:  9/29/17  Last Fill Quantity: 300,  # refills: 3   Last office visit: 4/25/2018 with prescribing provider:  4/25/18   Future Office Visit:     Sig: USE TO TEST BLOOD SUGAR TWICE DAILY OR AS DIRECTED    Diabetic Supplies Protocol Passed    5/29/2018  1:17 AM       Passed - Patient is 18 years of age or older       Passed - Recent (6 mo) or future (30 days) visit within the authorizing provider's specialty    Patient had office visit in the last 6 months or has a visit in the next 30 days with authorizing provider.  See \"Patient Info\" tab in inbasket, or \"Choose Columns\" in Meds & Orders section of the refill encounter.              "

## 2018-05-31 DIAGNOSIS — Z79.4 CONTROLLED TYPE 2 DIABETES MELLITUS WITHOUT COMPLICATION, WITH LONG-TERM CURRENT USE OF INSULIN (H): ICD-10-CM

## 2018-05-31 DIAGNOSIS — E11.9 CONTROLLED TYPE 2 DIABETES MELLITUS WITHOUT COMPLICATION, WITH LONG-TERM CURRENT USE OF INSULIN (H): ICD-10-CM

## 2018-05-31 NOTE — TELEPHONE ENCOUNTER
"Requested Prescriptions   Pending Prescriptions Disp Refills     glipiZIDE (GLUCOTROL XL) 10 MG 24 hr tablet [Pharmacy Med Name: GLIPIZIDE ER 10 MG TABLET] 90 tablet 1    Last Written Prescription Date:  11/30/2017 #90 x 1  Last filled 03/18/2018  Last office visit: 4/25/2018 [facility]   Future Office Visit:  None   Sig: TAKE 1 TABLET BY MOUTH ONCE DAILY    Sulfonylurea Agents Failed    5/31/2018  1:27 PM       Failed - Blood pressure less than 140/90 in past 6 months    BP Readings from Last 3 Encounters:   04/25/18 154/82   02/12/18 128/70   07/10/17 126/84                Failed - Patient has had a Microalbumin in the past 12 mos.    Recent Labs   Lab Test  05/22/15   1320 04/20/15   MICROALB   --   <5.0   MICROL  7   --    UMALCR  7.46  --            Passed - Patient has documented LDL within the past 12 mos.    Recent Labs   Lab Test  07/10/17   1450   LDL  76            Passed - Patient has documented A1c within the specified period of time.    If HgbA1C is 8 or greater, it needs to be on file within the past 3 months.  If less than 8, must be on file within the past 6 months.     Recent Labs   Lab Test  04/25/18   1502   A1C  8.5*            Passed - Patient is age 18 or older       Passed - Patient has a recent creatinine (normal) within the past 12 mos.    Recent Labs   Lab Test  07/10/17   1450   CR  0.76            Passed - Recent (6 mo) or future (30 days) visit within the authorizing provider's specialty    Patient had office visit in the last 6 months or has a visit in the next 30 days with authorizing provider or within the authorizing provider's specialty.  See \"Patient Info\" tab in inbasket, or \"Choose Columns\" in Meds & Orders section of the refill encounter.            BASAGLAR 100 UNIT/ML injection [Pharmacy Med Name: BASAGLAR 100 UNIT/ML KWIKPEN]  1    Last Written Prescription Date:  05/30/2017 #9ml x 3  Last filled 03/18/2018  Last office visit: 4/25/2018 DAISY Jean   Future Office Visit:  " "None    Note: Medication is not on the current med list.   Sig: INJECT 24 UNITS SUBCUTANEOUS AT BEDTIME    Long Acting Insulin Protocol Failed    5/31/2018  1:27 PM       Failed - Blood pressure less than 140/90 in past 6 months    BP Readings from Last 3 Encounters:   04/25/18 154/82   02/12/18 128/70   07/10/17 126/84                Failed - Microalbumin on file in past 12 months    Recent Labs   Lab Test  05/22/15   1320 04/20/15   MICROALB   --   <5.0   MICROL  7   --    UMALCR  7.46  --            Passed - LDL on file in past 12 months    Recent Labs   Lab Test  07/10/17   1450   LDL  76            Passed - Serum creatinine on file in past 12 months    Recent Labs   Lab Test  07/10/17   1450   CR  0.76            Passed - HgbA1C in past 3 or 6 months    If HgbA1C is 8 or greater, it needs to be on file within the past 3 months.  If less than 8, must be on file within the past 6 months.     Recent Labs   Lab Test  04/25/18   1502   A1C  8.5*            Passed - Patient is age 18 or older       Passed - Recent (6 mo) or future (30 days) visit within the authorizing provider's specialty    Patient had office visit in the last 6 months or has a visit in the next 30 days with authorizing provider or within the authorizing provider's specialty.  See \"Patient Info\" tab in inbasket, or \"Choose Columns\" in Meds & Orders section of the refill encounter.              "

## 2018-06-01 RX ORDER — GLIPIZIDE 10 MG/1
TABLET, FILM COATED, EXTENDED RELEASE ORAL
Qty: 90 TABLET | Refills: 1 | Status: SHIPPED | OUTPATIENT
Start: 2018-06-01 | End: 2018-12-11

## 2018-06-01 RX ORDER — INSULIN GLARGINE 100 [IU]/ML
INJECTION, SOLUTION SUBCUTANEOUS
Qty: 45 ML | Refills: 1 | Status: SHIPPED | OUTPATIENT
Start: 2018-06-01 | End: 2019-06-09

## 2018-07-10 DIAGNOSIS — E11.9 CONTROLLED TYPE 2 DIABETES MELLITUS WITHOUT COMPLICATION, UNSPECIFIED LONG TERM INSULIN USE STATUS: ICD-10-CM

## 2018-07-10 DIAGNOSIS — N52.2 DRUG-INDUCED ERECTILE DYSFUNCTION: ICD-10-CM

## 2018-07-10 DIAGNOSIS — E78.5 HYPERLIPIDEMIA LDL GOAL <100: ICD-10-CM

## 2018-07-11 RX ORDER — SILDENAFIL CITRATE 20 MG/1
TABLET ORAL
Qty: 30 TABLET | Refills: 1 | Status: SHIPPED | OUTPATIENT
Start: 2018-07-11 | End: 2018-07-12

## 2018-07-11 RX ORDER — SIMVASTATIN 10 MG
TABLET ORAL
Qty: 90 TABLET | Refills: 0 | Status: SHIPPED | OUTPATIENT
Start: 2018-07-11 | End: 2018-12-11

## 2018-07-11 NOTE — TELEPHONE ENCOUNTER
Prescription approved per [facility] Refill Protocol or patient Primary care provider (PCP)  TARA Diaz RN/Malcolm Ledesma      ED med routed for review   TARA Diaz RN/Malcolm Ledesma

## 2018-07-11 NOTE — TELEPHONE ENCOUNTER
"Requested Prescriptions   Pending Prescriptions Disp Refills     sildenafil (REVATIO) 20 MG tablet [Pharmacy Med Name: SILDENAFIL 20 MG TABLET] 30 tablet 1    Last Written Prescription Date:  12/21/16  Last Fill Quantity: 90,  # refills: 1   Last office visit: 4/25/2018 with prescribing provider:  4/25/18 martina   Future Office Visit:     Sig: TAKE 1 TAB AS NEEDED FOR PULMONARY HYPERTENSION.NEVER USE W/NITROGLYCERIN TERAZOSIN OR DOXAZOSIN    Erectile Dysfuction Protocol Passed    7/10/2018  3:11 PM       Passed - Absence of nitrates on medication list       Passed - Absence of Alpha Blockers on Med list       Passed - Recent (12 mo) or future (30 days) visit within the authorizing provider's specialty    Patient had office visit in the last 12 months or has a visit in the next 30 days with authorizing provider or within the authorizing provider's specialty.  See \"Patient Info\" tab in inbasket, or \"Choose Columns\" in Meds & Orders section of the refill encounter.           Passed - Patient is age 18 or older        metFORMIN (GLUCOPHAGE) 500 MG tablet [Pharmacy Med Name: METFORMIN  MG TABLET] 180 tablet 3    Last Written Prescription Date:  12/21/17  Last Fill Quantity: 180,  # refills: 3   Last office visit: 4/25/2018 with prescribing provider:  4/25/18 martina   Future Office Visit:     Sig: TAKE 2 TABLETS (1,000 MG) BY MOUTH 2 TIMES DAILY (WITH MEALS)    Biguanide Agents Failed    7/10/2018  3:11 PM       Failed - Blood pressure less than 140/90 in past 6 months    BP Readings from Last 3 Encounters:   04/25/18 154/82   02/12/18 128/70   07/10/17 126/84                Failed - Patient has had a Microalbumin in the past 12 mos.    Recent Labs   Lab Test  05/22/15   1320 04/20/15   MICROALB   --   <5.0   MICROL  7   --    UMALCR  7.46  --            Passed - Patient has documented LDL within the past 12 mos.    Recent Labs   Lab Test  07/10/17   1450   LDL  76            Passed - Patient is age 10 or older    " "   Passed - Patient has documented A1c within the specified period of time.    If HgbA1C is 8 or greater, it needs to be on file within the past 3 months.  If less than 8, must be on file within the past 6 months.     Recent Labs   Lab Test  04/25/18   1502   A1C  8.5*            Passed - Patient's CR is NOT>1.4 OR Patient's EGFR is NOT<45 within past 12 mos.    Recent Labs   Lab Test  07/10/17   1450   GFRESTIMATED  >90  Non  GFR Calc     GFRESTBLACK  >90   GFR Calc         Recent Labs   Lab Test  07/10/17   1450   CR  0.76            Passed - Patient does NOT have a diagnosis of CHF.       Passed - Recent (6 mo) or future (30 days) visit within the authorizing provider's specialty    Patient had office visit in the last 6 months or has a visit in the next 30 days with authorizing provider or within the authorizing provider's specialty.  See \"Patient Info\" tab in inWatticset, or \"Choose Columns\" in Meds & Orders section of the refill encounter.            simvastatin (ZOCOR) 10 MG tablet [Pharmacy Med Name: SIMVASTATIN 10 MG TABLET] 90 tablet 3    Last Written Prescription Date:  6/8/17  Last Fill Quantity: 90,  # refills: 3   Last office visit: 4/25/2018 with prescribing provider:  4/25/18 martina   Future Office Visit:     Sig: TAKE 1 TABLET AT BEDTIME    Statins Protocol Passed    7/10/2018  3:11 PM       Passed - LDL on file in past 12 months    Recent Labs   Lab Test  07/10/17   1450   LDL  76            Passed - No abnormal creatine kinase in past 12 months    No lab results found.            Passed - Recent (12 mo) or future (30 days) visit within the authorizing provider's specialty    Patient had office visit in the last 12 months or has a visit in the next 30 days with authorizing provider or within the authorizing provider's specialty.  See \"Patient Info\" tab in inWatticset, or \"Choose Columns\" in Meds & Orders section of the refill encounter.           Passed - Patient is age 18 " or older

## 2018-07-12 ENCOUNTER — OFFICE VISIT (OUTPATIENT)
Dept: FAMILY MEDICINE | Facility: CLINIC | Age: 53
End: 2018-07-12
Payer: COMMERCIAL

## 2018-07-12 VITALS
WEIGHT: 190.5 LBS | DIASTOLIC BLOOD PRESSURE: 62 MMHG | TEMPERATURE: 98.6 F | BODY MASS INDEX: 26.67 KG/M2 | HEIGHT: 71 IN | HEART RATE: 82 BPM | OXYGEN SATURATION: 99 % | RESPIRATION RATE: 24 BRPM | SYSTOLIC BLOOD PRESSURE: 142 MMHG

## 2018-07-12 DIAGNOSIS — S51.811A LACERATION OF RIGHT FOREARM, INITIAL ENCOUNTER: Primary | ICD-10-CM

## 2018-07-12 DIAGNOSIS — Z23 NEED FOR PROPHYLACTIC VACCINATION WITH TETANUS-DIPHTHERIA (TD): ICD-10-CM

## 2018-07-12 PROCEDURE — 90471 IMMUNIZATION ADMIN: CPT | Performed by: FAMILY MEDICINE

## 2018-07-12 PROCEDURE — 12002 RPR S/N/AX/GEN/TRNK2.6-7.5CM: CPT | Performed by: FAMILY MEDICINE

## 2018-07-12 PROCEDURE — 99213 OFFICE O/P EST LOW 20 MIN: CPT | Mod: 25 | Performed by: FAMILY MEDICINE

## 2018-07-12 PROCEDURE — 90715 TDAP VACCINE 7 YRS/> IM: CPT | Performed by: FAMILY MEDICINE

## 2018-07-12 RX ORDER — SULFAMETHOXAZOLE/TRIMETHOPRIM 800-160 MG
1 TABLET ORAL 2 TIMES DAILY
Qty: 20 TABLET | Refills: 0 | Status: SHIPPED | OUTPATIENT
Start: 2018-07-12 | End: 2018-07-22

## 2018-07-12 ASSESSMENT — PAIN SCALES - GENERAL: PAINLEVEL: NO PAIN (0)

## 2018-07-12 NOTE — MR AVS SNAPSHOT
"              After Visit Summary   2018    Stewart Turcios    MRN: [id number]           Patient Information     Date Of Birth          1965        Visit Information        Provider Department      2018 1:20 PM Fortunato Velazco MD [facility]        Today's Diagnoses     Laceration of right forearm, initial encounter    -  1    Need for prophylactic vaccination with tetanus-diphtheria (TD)           Follow-ups after your visit        Who to contact     If you have questions or need follow up information about today's clinic visit or your schedule please contact [facility] directly at [phone].  Normal or non-critical lab and imaging results will be communicated to you by Novatrishart, letter or phone within 4 business days after the clinic has received the results. If you do not hear from us within 7 days, please contact the clinic through Novatrishart or phone. If you have a critical or abnormal lab result, we will notify you by phone as soon as possible.  Submit refill requests through [email or web address] or call your pharmacy and they will forward the refill request to us. Please allow 3 business days for your refill to be completed.          Additional Information About Your Visit        MyChart Information     [email or web address] lets you send messages to your doctor, view your test results, renew your prescriptions, schedule appointments and more. To sign up, go to [email or web address] . Click on \"Log in\" on the left side of the screen, which will take you to the Welcome page. Then click on \"Sign up Now\" on the right side of the page.     You will be asked to enter the access code listed below, as well as some personal information. Please follow the directions to create your username and password.     Your access code is: [email or web address]  Expires: 10/10/2018  1:49 PM     Your access code will  in 90 days. If you need help or a new code, please call your [facility] or " "[phone].        Care EveryWhere ID     This is your Care EveryWhere ID. This could be used by other organizations to access your [place] medical records  [id number]        Your Vitals Were     Pulse Temperature Respirations Height Pulse Oximetry BMI (Body Mass Index)    82 98.6  F (37  C) (Oral) 24 5' 11\" (1.803 m) 99% 26.57 kg/m2       Blood Pressure from Last 3 Encounters:   07/12/18 142/62   04/25/18 154/82   02/12/18 128/70    Weight from Last 3 Encounters:   07/12/18 190 lb 8 oz (86.4 kg)   04/25/18 191 lb (86.6 kg)   02/12/18 193 lb 8 oz (87.8 kg)              We Performed the Following     ADMIN 1st VACCINE     REPAIR SUPERFICIAL, WOUND BODY 2.6-7.5 CM     TDAP VACCINE (ADACEL)          Today's Medication Changes          These changes are accurate as of 7/12/18  1:50 PM.  If you have any questions, ask your nurse or doctor.               Start taking these medicines.        Dose/Directions    sulfamethoxazole-trimethoprim 800-160 MG per tablet   Commonly known as:  BACTRIM DS   Used for:  Laceration of right forearm, initial encounter   Started by:  Fortunato Velazco MD        Dose:  1 tablet   Take 1 tablet by mouth 2 times daily for 10 days   Quantity:  20 tablet   Refills:  0            Where to get your medicines      These medications were sent to [facility] [address] [place] [address] [address]     Phone:  [phone]     sulfamethoxazole-trimethoprim 800-160 MG per tablet                Primary Care Provider Office Phone # Fax #    Anahi Jean [phone]       [address] [place] [facility] [address]        Equal Access to Services     ANDREI JAVIER AH: Kanika Choi, wasandritada luqadaha, qaybta kaalmada adeegyada, sg rodriguez. So [facility] [phone].    ATENCIÓN: Si habla español, tiene a lindsay disposición servicios gratuitos de asistencia lingüística. Llame al [phone].    We comply with " applicable federal civil rights laws and [place] laws. We do not discriminate on the basis of race, color, national origin, age, disability, sex, sexual orientation, or gender identity.            Thank you!     Thank you for choosing [facility]  for your care. Our goal is always to provide you with excellent care. Hearing back from our patients is one way we can continue to improve our services. Please take a few minutes to complete the written survey that you may receive in the mail after your visit with us. Thank you!             Your Updated Medication List - Protect others around you: Learn how to safely use, store and throw away your medicines at www.disposemymeds.org.          This list is accurate as of 7/12/18  1:50 PM.  Always use your most recent med list.                   Brand Name Dispense Instructions for use Diagnosis    aspirin 81 MG tablet     100    1 tab po QD (Once per day)    Type II or unspecified type diabetes mellitus without mention of complication, not stated as uncontrolled       blood glucose lancing device     1 each    Device to be used with lancets.    Diabetes mellitus type 2, controlled, without complications (H)       blood glucose monitoring lancets     1 Box    Use to test blood sugar four times daily or as directed.    Diabetes mellitus type 2, controlled, without complications (H)       * blood glucose monitoring test strip    ACCU-CHEK SMARTVIEW    50 each    Use to test blood sugar two times daily or as directed.    Diabetes mellitus type 2, controlled, without complications (H)       * blood glucose monitoring test strip    ACCU-CHEK ANDER    300 each    Use to test blood sugars 4 times daily or as directed.    Uncontrolled type II diabetes mellitus (H)       * blood glucose monitoring test strip    no brand specified    300 strip    Use to test blood sugars 4 times daily or as directed    Controlled type 2 diabetes mellitus without complication, with  long-term current use of insulin (H)       * ACCU-CHEK SMARTVIEW test strip   Generic drug:  blood glucose monitoring     200 strip    USE TO TEST BLOOD SUGAR TWICE DAILY OR AS DIRECTED    Diabetes mellitus type 2, controlled, without complications (H)       * BLOOD GLUCOSE TEST STRIPS STRP     qs    twice daily and prn    Type II or unspecified type diabetes mellitus without mention of complication, not stated as uncontrolled       * blood glucose monitoring meter device kit     1 kit    Use to test blood sugars four times daily or as directed.    Controlled type 2 diabetes mellitus without complication, with long-term current use of insulin (H)       glipiZIDE 10 MG 24 hr tablet    GLUCOTROL XL    90 tablet    TAKE 1 TABLET BY MOUTH ONCE DAILY    Controlled type 2 diabetes mellitus without complication, with long-term current use of insulin (H)       HumaLOG KWIKpen 100 UNIT/ML injection   Generic drug:  insulin lispro     45 mL    PER SLIDING SCALE BEFORE BREAKFAST AND DINNER, UP TO 20 UNITS PER DAY.    Controlled type 2 diabetes mellitus without complication, with long-term current use of insulin (H)       * insulin glargine 100 UNIT/ML injection    LANTUS SOLOSTAR    6 mL    Inject 24 Units Subcutaneous At Bedtime    Controlled type 2 diabetes mellitus without complication, with long-term current use of insulin (H)       * BASAGLAR 100 UNIT/ML injection     45 mL    INJECT 24 UNITS SUBCUTANEOUS AT BEDTIME    Controlled type 2 diabetes mellitus without complication, with long-term current use of insulin (H)       * insulin pen needle 31G X 8 MM    B-D U/F    100 each    As directed.    Uncontrolled type II diabetes mellitus (H)       * BD MUSA U/F 32G X 4 MM   Generic drug:  insulin pen needle     360 each    USE 4 TIMES DAILY OR AS DIRECTED.    Controlled type 2 diabetes mellitus without complication, with long-term current use of insulin (H)       liraglutide 18 MG/3ML soln    VICTOZA    9 mL    Inject 1.2 mg  Subcutaneous daily    Controlled type 2 diabetes mellitus without complication, with long-term current use of insulin (H)       losartan 50 MG tablet    COZAAR    90 tablet    TAKE 1 TABLET (50 MG) BY MOUTH DAILY    Essential hypertension with goal blood pressure less than 140/90       metFORMIN 500 MG tablet    GLUCOPHAGE    360 tablet    TAKE 2 TABLETS (1,000 MG) BY MOUTH 2 TIMES DAILY (WITH MEALS)    Controlled type 2 diabetes mellitus without complication, unspecified long term insulin use status (H)       MULTIVITAMIN PO           sildenafil 20 MG tablet    REVATIO    90 tablet    Take 1 tablet (20 mg) by mouth once as needed    Drug-induced erectile dysfunction       simvastatin 10 MG tablet    ZOCOR    90 tablet    TAKE 1 TABLET AT BEDTIME    Hyperlipidemia LDL goal <100       sulfamethoxazole-trimethoprim 800-160 MG per tablet    BACTRIM DS    20 tablet    Take 1 tablet by mouth 2 times daily for 10 days    Laceration of right forearm, initial encounter       VITAMIN D3 PO      Take 2,000 Units by mouth daily        * Notice:  This list has 10 medication(s) that are the same as other medications prescribed for you. Read the directions carefully, and ask your doctor or other care provider to review them with you.

## 2018-07-12 NOTE — NURSING NOTE
Screening Questionnaire for Adult Immunization    Are you sick today?   No   Do you have allergies to medications, food, a vaccine component or latex?   No   Have you ever had a serious reaction after receiving a vaccination?   No   Do you have a long-term health problem with heart disease, lung disease, asthma, kidney disease, metabolic disease (e.g. diabetes), anemia, or other blood disorder?   No   Do you have cancer, leukemia, HIV/AIDS, or any other immune system problem?   No   In the past 3 months, have you taken medications that affect  your immune system, such as prednisone, other steroids, or anticancer drugs; drugs for the treatment of rheumatoid arthritis, Crohn s disease, or psoriasis; or have you had radiation treatments?   No   Have you had a seizure, or a brain or other nervous system problem?   No   During the past year, have you received a transfusion of blood or blood     products, or been given immune (gamma) globulin or antiviral drug?   No   For women: Are you pregnant or is there a chance you could become        pregnant during the next month?   No   Have you received any vaccinations in the past 4 weeks?   No     Immunization questionnaire answers were all negative.         Patient instructed to remain in clinic for 15 minutes afterwards, and to report any adverse reaction to me immediately.       Screening performed by Bebe Alvarez on 7/12/2018 at 2:41 PM.

## 2018-07-12 NOTE — PROGRESS NOTES
SUBJECTIVE:   Stewart Turcios is a 52 year old male who presents to clinic today for the following health issues:      Laceration on right arm      Duration: last night    Description (location/character/radiation): cut right arm last night    Intensity:  moderate    Accompanying signs and symptoms: None.    History (similar episodes/previous evaluation): None    Precipitating or alleviating factors: None    Therapies tried and outcome: cleaning with alcohol , septic ointment and covering with band-aid       Problem list and histories reviewed & adjusted, as indicated.  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 No pertinent surgical history.    Social History   Substance Use Topics     Smoking status: Never Smoker     Smokeless tobacco: Never Used     Alcohol use 0.0 oz/week     0 Standard drinks or equivalent per week      Comment: occasional     Family History   Problem Relation Age of Onset     Hypertension Father      Diabetes Sister      Diabetes Brother      Cancer No family hx of      Cerebrovascular Disease No family hx of      Thyroid Disease No family hx of      Glaucoma No family hx of      Macular Degeneration No family hx of            Reviewed and updated as needed this visit by clinical staff  Tobacco  Allergies  Meds  Med Hx  Surg Hx  Fam Hx  Soc Hx      Reviewed and updated as needed this visit by Provider         ROS:  Constitutional, HEENT, cardiovascular, pulmonary, GI, , musculoskeletal,  "neuro, skin, endocrine and psych systems are negative, except as otherwise noted.    OBJECTIVE:     /62  Pulse 82  Temp 98.6  F (37  C) (Oral)  Resp 24  Ht 5' 11\" (1.803 m)  Wt 190 lb 8 oz (86.4 kg)  SpO2 99%  BMI 26.57 kg/m2  Body mass index is 26.57 kg/(m^2).  GENERAL: healthy, alert and no distress  NECK: no adenopathy, no asymmetry, masses, or scars and thyroid normal to palpation  RESP: lungs clear to auscultation - no rales, rhonchi or wheezes  CV: regular rate and rhythm, normal S1 S2, no S3 or S4, no murmur, click or rub, no peripheral edema and peripheral pulses strong  ABDOMEN: soft, nontender, no hepatosplenomegaly, no masses and bowel sounds normal  MS: no gross musculoskeletal defects noted, no edema  SKIN: 3 cm laceration right forearm, no fb, clean    Diagnostic Test Results:  none     ASSESSMENT/PLAN:     1. Laceration of right forearm, initial encounter  Area cleansed with iodine and alcohol. Laceration approximated with 4 sutures after localized lidocaine 2% w/epi used for local anesthesia (used 1.5 ml). No complications. Discussed signs of infection and when to be seen. Did give patient abx due to uncontrolled diabetes to prevent infection. Patient will have sutures removed in 10 days. Patient also due for diabetes visit for recheck. Td updated today.  - REPAIR SUPERFICIAL, WOUND BODY 2.6-7.5 CM  - sulfamethoxazole-trimethoprim (BACTRIM DS) 800-160 MG per tablet; Take 1 tablet by mouth 2 times daily for 10 days  Dispense: 20 tablet; Refill: 0    2. Need for prophylactic vaccination with tetanus-diphtheria (TD)    - TDAP VACCINE (ADACEL)  - ADMIN 1st VACCINE    See Patient Instructions    Fortunato Velazco MD, MD  [facility]  "

## 2018-07-17 ENCOUNTER — TELEPHONE (OUTPATIENT)
Dept: FAMILY MEDICINE | Facility: CLINIC | Age: 53
End: 2018-07-17

## 2018-07-17 DIAGNOSIS — E78.5 HYPERLIPIDEMIA WITH TARGET LDL LESS THAN 100: ICD-10-CM

## 2018-07-17 DIAGNOSIS — Z79.4 CONTROLLED TYPE 2 DIABETES MELLITUS WITHOUT COMPLICATION, WITH LONG-TERM CURRENT USE OF INSULIN (H): Primary | ICD-10-CM

## 2018-07-17 DIAGNOSIS — E11.9 CONTROLLED TYPE 2 DIABETES MELLITUS WITHOUT COMPLICATION, WITH LONG-TERM CURRENT USE OF INSULIN (H): Primary | ICD-10-CM

## 2018-07-17 NOTE — TELEPHONE ENCOUNTER
Patient will be in on Monday 7/23 and would like an order put in to have his A1C checked.    Isabella Collins [facility]

## 2018-07-23 ENCOUNTER — ALLIED HEALTH/NURSE VISIT (OUTPATIENT)
Dept: FAMILY MEDICINE | Facility: CLINIC | Age: 53
End: 2018-07-23
Payer: COMMERCIAL

## 2018-07-23 DIAGNOSIS — Z48.01 ENCOUNTER FOR CHANGE OR REMOVAL OF SURGICAL WOUND DRESSING: Primary | ICD-10-CM

## 2018-07-23 DIAGNOSIS — Z79.4 CONTROLLED TYPE 2 DIABETES MELLITUS WITHOUT COMPLICATION, WITH LONG-TERM CURRENT USE OF INSULIN (H): ICD-10-CM

## 2018-07-23 DIAGNOSIS — E78.5 HYPERLIPIDEMIA WITH TARGET LDL LESS THAN 100: ICD-10-CM

## 2018-07-23 DIAGNOSIS — E11.9 CONTROLLED TYPE 2 DIABETES MELLITUS WITHOUT COMPLICATION, WITH LONG-TERM CURRENT USE OF INSULIN (H): ICD-10-CM

## 2018-07-23 LAB
ANION GAP SERPL CALCULATED.3IONS-SCNC: 8 MMOL/L (ref 3–14)
BUN SERPL-MCNC: 11 MG/DL (ref 7–30)
CALCIUM SERPL-MCNC: 8.7 MG/DL (ref 8.5–10.1)
CHLORIDE SERPL-SCNC: 102 MMOL/L (ref 94–109)
CHOLEST SERPL-MCNC: 160 MG/DL
CO2 SERPL-SCNC: 26 MMOL/L (ref 20–32)
CREAT SERPL-MCNC: 0.87 MG/DL (ref 0.66–1.25)
CREAT UR-MCNC: 195 MG/DL
GFR SERPL CREATININE-BSD FRML MDRD: >90 ML/MIN/1.7M2
GLUCOSE SERPL-MCNC: 241 MG/DL (ref 70–99)
HBA1C MFR BLD: 8.9 % (ref 0–5.6)
HDLC SERPL-MCNC: 41 MG/DL
LDLC SERPL CALC-MCNC: 70 MG/DL
MICROALBUMIN UR-MCNC: 52 MG/L
MICROALBUMIN/CREAT UR: 26.46 MG/G CR (ref 0–17)
NONHDLC SERPL-MCNC: 119 MG/DL
POTASSIUM SERPL-SCNC: 4.2 MMOL/L (ref 3.4–5.3)
SODIUM SERPL-SCNC: 136 MMOL/L (ref 133–144)
TRIGL SERPL-MCNC: 243 MG/DL
TSH SERPL DL<=0.005 MIU/L-ACNC: 1.48 MU/L (ref 0.4–4)

## 2018-07-23 PROCEDURE — 36415 COLL VENOUS BLD VENIPUNCTURE: CPT | Performed by: FAMILY MEDICINE

## 2018-07-23 PROCEDURE — 84443 ASSAY THYROID STIM HORMONE: CPT | Performed by: FAMILY MEDICINE

## 2018-07-23 PROCEDURE — 80048 BASIC METABOLIC PNL TOTAL CA: CPT | Performed by: FAMILY MEDICINE

## 2018-07-23 PROCEDURE — 83036 HEMOGLOBIN GLYCOSYLATED A1C: CPT | Performed by: FAMILY MEDICINE

## 2018-07-23 PROCEDURE — 99207 ZZC NO BILLABLE SERVICE THIS VISIT: CPT

## 2018-07-23 PROCEDURE — 80061 LIPID PANEL: CPT | Performed by: FAMILY MEDICINE

## 2018-07-23 PROCEDURE — 82043 UR ALBUMIN QUANTITATIVE: CPT | Performed by: FAMILY MEDICINE

## 2018-07-23 NOTE — NURSING NOTE
Suture removal:     Date sutures applied: 07/12/2018         Where (setting) in which they applied:[facility]    Description:  Type: sutures  Location: Right arm    History:    Cause of laceration: Cut arm on lampshade while cleaning    Accompanying Signs & Symptoms:   Redness: no  Warmth: no  Drainage: no  Still bleeding: no  Fevers: no  Last tetanus shot: last tetanus booster within 10 years    Laura Kruger CMA

## 2018-07-23 NOTE — MR AVS SNAPSHOT
After Visit Summary   7/23/2018    Stewart Turcios    MRN: [id number]           Patient Information     Date Of Birth          1965        Visit Information        Provider Department      7/23/2018 3:30 PM Hemant/Lpn, Fl [facility]        Today's Diagnoses     Encounter for change or removal of surgical wound dressing    -  1       Follow-ups after your visit        Follow-up notes from your care team     Return if symptoms worsen or fail to improve.      Your next 10 appointments already scheduled     Jul 23, 2018  3:45 PM CDT   LAB with  LAB   [facility]    [address] [address]   [phone]           Please do not eat 10-12 hours before your appointment if you are coming in fasting for labs on lipids, cholesterol, or glucose (sugar). This does not apply to pregnant women. Water, hot tea and black coffee (with nothing added) are okay. Do not drink other fluids, diet soda or chew gum.              Who to contact     Normal or non-critical lab and imaging results will be communicated to you by [employer], letter or phone within 4 business days after the clinic has received the results. If you do not hear from us within 7 days, please contact the clinic through [employer] or phone. If you have a critical or abnormal lab result, we will notify you by phone as soon as possible.  Submit refill requests through [employer] or call your pharmacy and they will forward the refill request to us. Please allow 3 business days for your refill to be completed.          If you need to speak with a  for additional information , please call: [phone]           Additional Information About Your Visit        [employer] Information     [employer] lets you send messages to your doctor, view your test results, renew your prescriptions, schedule appointments and more. To sign up, go to [email or web address] . Click on  "\"Log in\" on the left side of the screen, which will take you to the Welcome page. Then click on \"Sign up Now\" on the right side of the page.     You will be asked to enter the access code listed below, as well as some personal information. Please follow the directions to create your username and password.     Your access code is: [email or web address]  Expires: 10/10/2018  1:49 PM     Your access code will  in 90 days. If you need help or a new code, please call your [place] clinic or [phone].        Care EveryWhere ID     This is your Care EveryWhere ID. This could be used by other organizations to access your [place] medical records  [id number]         Blood Pressure from Last 3 Encounters:   18 142/62   18 154/82   18 128/70    Weight from Last 3 Encounters:   18 190 lb 8 oz (86.4 kg)   18 191 lb (86.6 kg)   18 193 lb 8 oz (87.8 kg)              We Performed the Following     Suture Removal No Charge        Primary Care Provider Office Phone # Fax #    Anahi Jean [phone] [address] [place] [address]        Equal Access to Services     KELVIN JAVIER AH: Hadii aad ku hadasho Soomaali, waaxda luqadaha, qaybta kaalmada adeegyada, waxay idiin hayaan alexia briscoe . So [facility] [phone].    ATENCIÓN: Si habla español, tiene a lindsay disposición servicios gratuitos de asistencia lingüística. Llame al [phone].    We comply with applicable federal civil rights laws and [place] laws. We do not discriminate on the basis of race, color, national origin, age, disability, sex, sexual orientation, or gender identity.            Thank you!     Thank you for choosing [facility] [place]  for your care. Our goal is always to provide you with excellent care. Hearing back from our patients is one way we can continue to improve our services. Please take a few minutes to complete the written survey that you may receive in the mail after " your visit with us. Thank you!             Your Updated Medication List - Protect others around you: Learn how to safely use, store and throw away your medicines at www.disposemymeds.org.          This list is accurate as of 7/23/18  3:42 PM.  Always use your most recent med list.                   Brand Name Dispense Instructions for use Diagnosis    aspirin 81 MG tablet     100    1 tab po QD (Once per day)    Type II or unspecified type diabetes mellitus without mention of complication, not stated as uncontrolled       blood glucose lancing device     1 each    Device to be used with lancets.    Diabetes mellitus type 2, controlled, without complications (H)       blood glucose monitoring lancets     1 Box    Use to test blood sugar four times daily or as directed.    Diabetes mellitus type 2, controlled, without complications (H)       * blood glucose monitoring test strip    ACCU-CHEK SMARTVIEW    50 each    Use to test blood sugar two times daily or as directed.    Diabetes mellitus type 2, controlled, without complications (H)       * blood glucose monitoring test strip    ACCU-CHEK ANDER    300 each    Use to test blood sugars 4 times daily or as directed.    Uncontrolled type II diabetes mellitus (H)       * blood glucose monitoring test strip    no brand specified    300 strip    Use to test blood sugars 4 times daily or as directed    Controlled type 2 diabetes mellitus without complication, with long-term current use of insulin (H)       * ACCU-CHEK SMARTVIEW test strip   Generic drug:  blood glucose monitoring     200 strip    USE TO TEST BLOOD SUGAR TWICE DAILY OR AS DIRECTED    Diabetes mellitus type 2, controlled, without complications (H)       * BLOOD GLUCOSE TEST STRIPS STRP     qs    twice daily and prn    Type II or unspecified type diabetes mellitus without mention of complication, not stated as uncontrolled       * blood glucose monitoring meter device kit     1 kit    Use to test blood sugars  four times daily or as directed.    Controlled type 2 diabetes mellitus without complication, with long-term current use of insulin (H)       glipiZIDE 10 MG 24 hr tablet    GLUCOTROL XL    90 tablet    TAKE 1 TABLET BY MOUTH ONCE DAILY    Controlled type 2 diabetes mellitus without complication, with long-term current use of insulin (H)       HumaLOG KWIKpen 100 UNIT/ML injection   Generic drug:  insulin lispro     45 mL    PER SLIDING SCALE BEFORE BREAKFAST AND DINNER, UP TO 20 UNITS PER DAY.    Controlled type 2 diabetes mellitus without complication, with long-term current use of insulin (H)       * insulin glargine 100 UNIT/ML injection    LANTUS SOLOSTAR    6 mL    Inject 24 Units Subcutaneous At Bedtime    Controlled type 2 diabetes mellitus without complication, with long-term current use of insulin (H)       * BASAGLAR 100 UNIT/ML injection     45 mL    INJECT 24 UNITS SUBCUTANEOUS AT BEDTIME    Controlled type 2 diabetes mellitus without complication, with long-term current use of insulin (H)       * insulin pen needle 31G X 8 MM    B-D U/F    100 each    As directed.    Uncontrolled type II diabetes mellitus (H)       * BD MUSA U/F 32G X 4 MM   Generic drug:  insulin pen needle     360 each    USE 4 TIMES DAILY OR AS DIRECTED.    Controlled type 2 diabetes mellitus without complication, with long-term current use of insulin (H)       liraglutide 18 MG/3ML soln    VICTOZA    9 mL    Inject 1.2 mg Subcutaneous daily    Controlled type 2 diabetes mellitus without complication, with long-term current use of insulin (H)       losartan 50 MG tablet    COZAAR    90 tablet    TAKE 1 TABLET (50 MG) BY MOUTH DAILY    Essential hypertension with goal blood pressure less than 140/90       metFORMIN 500 MG tablet    GLUCOPHAGE    360 tablet    TAKE 2 TABLETS (1,000 MG) BY MOUTH 2 TIMES DAILY (WITH MEALS)    Controlled type 2 diabetes mellitus without complication, unspecified long term insulin use status (H)        MULTIVITAMIN PO           sildenafil 20 MG tablet    REVATIO    90 tablet    Take 1 tablet (20 mg) by mouth once as needed    Drug-induced erectile dysfunction       simvastatin 10 MG tablet    ZOCOR    90 tablet    TAKE 1 TABLET AT BEDTIME    Hyperlipidemia LDL goal <100       VITAMIN D3 PO      Take 2,000 Units by mouth daily        * Notice:  This list has 10 medication(s) that are the same as other medications prescribed for you. Read the directions carefully, and ask your doctor or other care provider to review them with you.

## 2018-07-23 NOTE — LETTER
August 6, 2018      Stewart Turcios  [address]  [place] [address]          Loyd William you will find a copy of your recent test results.  They show that you have normal kidney and thyroid function, but your blood sugars have increased.  Your hemoglobin A1c, a three-month average of your blood sugar readings, is not 8.9.  It had been 8.5.   Your cholesterol is very good, and for the first time there is a small amount of protein, or microalbumin, in your urine.  This is not significant, but just needs to be watched.     It may be time to establish with the endocrinologist.  Please call with any questions or concerns.     Resulted Orders   Albumin Random Urine Quantitative with Creat Ratio   Result Value Ref Range    Creatinine Urine 195 mg/dL    Albumin Urine mg/L 52 mg/L    Albumin Urine mg/g Cr 26.46 (H) 0 - 17 mg/g Cr   **A1C FUTURE anytime   Result Value Ref Range    Hemoglobin A1C 8.9 (H) 0 - 5.6 %      Comment:      Normal <5.7% Prediabetes 5.7-6.4%  Diabetes 6.5% or higher - adopted from ADA   consensus guidelines.     Lipid panel reflex to direct LDL Fasting   Result Value Ref Range    Cholesterol 160 <200 mg/dL    Triglycerides 243 (H) <150 mg/dL      Comment:      Borderline high:  150-199 mg/dl  High:             200-499 mg/dl  Very high:       >499 mg/dl  Non Fasting      HDL Cholesterol 41 >39 mg/dL    LDL Cholesterol Calculated 70 <100 mg/dL      Comment:      Desirable:       <100 mg/dl    Non HDL Cholesterol 119 <130 mg/dL   **TSH with free T4 reflex FUTURE anytime   Result Value Ref Range    TSH 1.48 0.40 - 4.00 mU/L   **Basic metabolic panel FUTURE anytime   Result Value Ref Range    Sodium 136 133 - 144 mmol/L    Potassium 4.2 3.4 - 5.3 mmol/L    Chloride 102 94 - 109 mmol/L    Carbon Dioxide 26 20 - 32 mmol/L    Anion Gap 8 3 - 14 mmol/L    Glucose 241 (H) 70 - 99 mg/dL      Comment:      Non Fasting    Urea Nitrogen 11 7 - 30 mg/dL    Creatinine 0.87 0.66 - 1.25 mg/dL     GFR Estimate >90 >60 mL/min/1.7m2      Comment:      Non  GFR Calc    GFR Estimate If Black >90 >60 mL/min/1.7m2      Comment:       GFR Calc    Calcium 8.7 8.5 - 10.1 mg/dL       If you have any questions or concerns, please call the clinic at the number listed above.       Sincerely,        Anahi Jean MD/dulce

## 2018-07-23 NOTE — PROGRESS NOTES
S: Patient is here today for suture removal.  The patient reports no complications with wound.  Sutures were placed 11 days ago.  Sutures were placed at [facility].    o: Wound is well healed, no signs of secondary infection.  Sutures removed without complication.    A: Laceration    P: Sutures removed, F/U PRN.    Laura Kruger CMA

## 2018-11-07 ENCOUNTER — NURSE TRIAGE (OUTPATIENT)
Dept: NURSING | Facility: CLINIC | Age: 53
End: 2018-11-07

## 2018-11-07 ENCOUNTER — ALLIED HEALTH/NURSE VISIT (OUTPATIENT)
Dept: FAMILY MEDICINE | Facility: CLINIC | Age: 53
End: 2018-11-07
Payer: COMMERCIAL

## 2018-11-07 ENCOUNTER — TELEPHONE (OUTPATIENT)
Dept: FAMILY MEDICINE | Facility: CLINIC | Age: 53
End: 2018-11-07

## 2018-11-07 DIAGNOSIS — Z23 NEED FOR PROPHYLACTIC VACCINATION AND INOCULATION AGAINST INFLUENZA: Primary | ICD-10-CM

## 2018-11-07 DIAGNOSIS — J06.9 URI (UPPER RESPIRATORY INFECTION): ICD-10-CM

## 2018-11-07 DIAGNOSIS — E11.9 CONTROLLED TYPE 2 DIABETES MELLITUS WITHOUT COMPLICATION, WITH LONG-TERM CURRENT USE OF INSULIN (H): Primary | ICD-10-CM

## 2018-11-07 DIAGNOSIS — E11.9 CONTROLLED TYPE 2 DIABETES MELLITUS WITHOUT COMPLICATION, WITH LONG-TERM CURRENT USE OF INSULIN (H): ICD-10-CM

## 2018-11-07 DIAGNOSIS — Z79.4 CONTROLLED TYPE 2 DIABETES MELLITUS WITHOUT COMPLICATION, WITH LONG-TERM CURRENT USE OF INSULIN (H): ICD-10-CM

## 2018-11-07 DIAGNOSIS — Z79.4 CONTROLLED TYPE 2 DIABETES MELLITUS WITHOUT COMPLICATION, WITH LONG-TERM CURRENT USE OF INSULIN (H): Primary | ICD-10-CM

## 2018-11-07 LAB — HBA1C MFR BLD: 8.3 % (ref 0–5.6)

## 2018-11-07 PROCEDURE — 36415 COLL VENOUS BLD VENIPUNCTURE: CPT | Performed by: FAMILY MEDICINE

## 2018-11-07 PROCEDURE — 90682 RIV4 VACC RECOMBINANT DNA IM: CPT

## 2018-11-07 PROCEDURE — 90471 IMMUNIZATION ADMIN: CPT

## 2018-11-07 PROCEDURE — 83036 HEMOGLOBIN GLYCOSYLATED A1C: CPT | Performed by: FAMILY MEDICINE

## 2018-11-07 RX ORDER — AZITHROMYCIN 250 MG/1
TABLET, FILM COATED ORAL
Qty: 6 TABLET | Refills: 0 | Status: SHIPPED | OUTPATIENT
Start: 2018-11-07 | End: 2018-11-15

## 2018-11-07 NOTE — PROGRESS NOTES
Injectable Influenza Immunization Documentation    1.  Is the person to be vaccinated sick today?   No    2. Does the person to be vaccinated have an allergy to a component   of the vaccine?   No  Egg Allergy Algorithm Link    3. Has the person to be vaccinated ever had a serious reaction   to influenza vaccine in the past?   No    4. Has the person to be vaccinated ever had Guillain-Barré syndrome?   No    Form completed by   Vicenta Hawthorne CMA on 11/7/2018 at 2:22 PM

## 2018-11-07 NOTE — TELEPHONE ENCOUNTER
Wants an appointment for a flu shot.  I connected the patient with scheduling, for an appointment.  Aster Wei RN-[facility]

## 2018-11-07 NOTE — TELEPHONE ENCOUNTER
Patient   present as walk in to get RX for antibiotic for UR  Gets them 1-2 times a year at change of season, travel a lot for work, is diabetic uncontrolled and has poor diet  Lab Results   Component Value Date    A1C 8.9 07/23/2018    A1C 8.5 04/25/2018    A1C 9.0 02/12/2018    A1C 8.2 11/01/2017    A1C 8.0 07/10/2017     Pt denies SOB, no  temp , has cough and some nasal drainage, will be traveling again soon and would like to get something before he does  Review of chart pt does get antibiotic from time to time , works well  Had long discuss on his uncontrolled DM, will get A1c today and have him make appt with DE , discuss need to get DM under better control soon  He agreed and will call for appt  Pt has NOT been taking VICTOZA never picked up RX  Pt has never seen endo ,   [t states BG  Over 200   a1c done , RX sent to f/u with Miranda after DE appt   TARA Diaz  RN/Mal[facility]esma

## 2018-11-07 NOTE — MR AVS SNAPSHOT
"              After Visit Summary   11/7/2018    Stewart Turcios    MRN: [id number]           Patient Information     Date Of Birth          1965        Visit Information        Provider Department      11/7/2018 2:45 PM Cma/Lpn, Fl [facility]        Today's Diagnoses     Need for prophylactic vaccination and inoculation against influenza    -  1       Follow-ups after your visit        Your next 10 appointments already scheduled     Nov 07, 2018  2:45 PM CST   Nurse Only with Fl [facility] Cma/Lpn   [facility]    [address] [address]   [phone]              Who to contact     Normal or non-critical lab and imaging results will be communicated to you by [employer], letter or phone within 4 business days after the clinic has received the results. If you do not hear from us within 7 days, please contact the clinic through MyChart or phone. If you have a critical or abnormal lab result, we will notify you by phone as soon as possible.  Submit refill requests through [employer] or call your pharmacy and they will forward the refill request to us. Please allow 3 business days for your refill to be completed.          If you need to speak with a  for additional information , please call: [phone]           Additional Information About Your Visit        [employer] Information     [employer] lets you send messages to your doctor, view your test results, renew your prescriptions, schedule appointments and more. To sign up, go to [email or web address] [email or web address]/[email or web address] . Click on \"Log in\" on the left side of the screen, which will take you to the Welcome page. Then click on \"Sign up Now\" on the right side of the page.     You will be asked to enter the access code listed below, as well as some personal information. Please follow the directions to create your username and password.     Your access code is: [email or web address]  Expires: 2/5/2019  " 2:23 PM     Your access code will  in 90 days. If you need help or a new code, please call your [place] clinic or [phone].        Care EveryWhere ID     This is your Care EveryWhere ID. This could be used by other organizations to access your [place] medical records  [id number]         Blood Pressure from Last 3 Encounters:   18 142/62   18 154/82   18 128/70    Weight from Last 3 Encounters:   18 190 lb 8 oz (86.4 kg)   18 191 lb (86.6 kg)   18 193 lb 8 oz (87.8 kg)              We Performed the Following     FLU VACCINE, (RIV4) RECOMBINANT HA  , IM (FluBlok, egg free) [15337]- >18 YRS (FMG recommended  50-64 YRS)        Primary Care Provider Office Phone # Fax #    Anahi Jean [phone] [address] [place] [address]        Equal Access to Services     [facility]: Hadii aad ku hadasho Soomaali, waaxda luqadaha, qaybta kaalmada adeegyada, waxay griselin haycarin briscoe . So [facility] [phone].    ATENCIÓN: Si habla español, tiene a lindsay disposición servicios gratuitos de asistencia lingüística. Llame al [phone].    We comply with applicable federal civil rights laws and [place] laws. We do not discriminate on the basis of race, color, national origin, age, disability, sex, sexual orientation, or gender identity.            Thank you!     [facility]  for your care. Our goal is always to provide you with excellent care. Hearing back from our patients is one way we can continue to improve our services. Please take a few minutes to complete the written survey that you may receive in the mail after your visit with us. Thank you!             Your Updated Medication List - Protect others around you: Learn how to safely use, store and throw away your medicines at www.disposemymeds.org.          This list is accurate as of 18  2:23 PM.  Always use your most recent med list.                    Brand Name Dispense Instructions for use Diagnosis    aspirin 81 MG tablet     100    1 tab po QD (Once per day)    Type II or unspecified type diabetes mellitus without mention of complication, not stated as uncontrolled       blood glucose lancing device     1 each    Device to be used with lancets.    Diabetes mellitus type 2, controlled, without complications (H)       blood glucose monitoring lancets     1 Box    Use to test blood sugar four times daily or as directed.    Diabetes mellitus type 2, controlled, without complications (H)       * blood glucose monitoring test strip    ACCU-CHEK SMARTVIEW    50 each    Use to test blood sugar two times daily or as directed.    Diabetes mellitus type 2, controlled, without complications (H)       * blood glucose monitoring test strip    ACCU-CHEK ANDER    300 each    Use to test blood sugars 4 times daily or as directed.    Uncontrolled type II diabetes mellitus (H)       * blood glucose monitoring test strip    no brand specified    300 strip    Use to test blood sugars 4 times daily or as directed    Controlled type 2 diabetes mellitus without complication, with long-term current use of insulin (H)       * ACCU-CHEK SMARTVIEW test strip   Generic drug:  blood glucose monitoring     200 strip    USE TO TEST BLOOD SUGAR TWICE DAILY OR AS DIRECTED    Diabetes mellitus type 2, controlled, without complications (H)       * BLOOD GLUCOSE TEST STRIPS STRP     qs    twice daily and prn    Type II or unspecified type diabetes mellitus without mention of complication, not stated as uncontrolled       * blood glucose monitoring meter device kit     1 kit    Use to test blood sugars four times daily or as directed.    Controlled type 2 diabetes mellitus without complication, with long-term current use of insulin (H)       glipiZIDE 10 MG 24 hr tablet    GLUCOTROL XL    90 tablet    TAKE 1 TABLET BY MOUTH ONCE DAILY    Controlled type 2 diabetes mellitus without  complication, with long-term current use of insulin (H)       HumaLOG KWIKpen 100 UNIT/ML injection   Generic drug:  insulin lispro     45 mL    PER SLIDING SCALE BEFORE BREAKFAST AND DINNER, UP TO 20 UNITS PER DAY.    Controlled type 2 diabetes mellitus without complication, with long-term current use of insulin (H)       * insulin glargine 100 UNIT/ML injection    LANTUS SOLOSTAR    6 mL    Inject 24 Units Subcutaneous At Bedtime    Controlled type 2 diabetes mellitus without complication, with long-term current use of insulin (H)       * BASAGLAR 100 UNIT/ML injection     45 mL    INJECT 24 UNITS SUBCUTANEOUS AT BEDTIME    Controlled type 2 diabetes mellitus without complication, with long-term current use of insulin (H)       * insulin pen needle 31G X 8 MM    B-D U/F    100 each    As directed.    Uncontrolled type II diabetes mellitus (H)       * BD MUSA U/F 32G X 4 MM   Generic drug:  insulin pen needle     360 each    USE 4 TIMES DAILY OR AS DIRECTED.    Controlled type 2 diabetes mellitus without complication, with long-term current use of insulin (H)       liraglutide 18 MG/3ML soln    VICTOZA    9 mL    Inject 1.2 mg Subcutaneous daily    Controlled type 2 diabetes mellitus without complication, with long-term current use of insulin (H)       losartan 50 MG tablet    COZAAR    90 tablet    TAKE 1 TABLET (50 MG) BY MOUTH DAILY    Essential hypertension with goal blood pressure less than 140/90       metFORMIN 500 MG tablet    GLUCOPHAGE    360 tablet    TAKE 2 TABLETS (1,000 MG) BY MOUTH 2 TIMES DAILY (WITH MEALS)    Controlled type 2 diabetes mellitus without complication, unspecified long term insulin use status       MULTIVITAMIN PO           sildenafil 20 MG tablet    REVATIO    90 tablet    Take 1 tablet (20 mg) by mouth once as needed    Drug-induced erectile dysfunction       simvastatin 10 MG tablet    ZOCOR    90 tablet    TAKE 1 TABLET AT BEDTIME    Hyperlipidemia LDL goal <100       VITAMIN D3  PO      Take 2,000 Units by mouth daily        * Notice:  This list has 10 medication(s) that are the same as other medications prescribed for you. Read the directions carefully, and ask your doctor or other care provider to review them with you.

## 2018-11-08 ENCOUNTER — TELEPHONE (OUTPATIENT)
Dept: EDUCATION SERVICES | Facility: CLINIC | Age: 53
End: 2018-11-08

## 2018-11-08 DIAGNOSIS — Z79.4 CONTROLLED TYPE 2 DIABETES MELLITUS WITHOUT COMPLICATION, WITH LONG-TERM CURRENT USE OF INSULIN (H): Primary | ICD-10-CM

## 2018-11-08 DIAGNOSIS — E11.9 CONTROLLED TYPE 2 DIABETES MELLITUS WITHOUT COMPLICATION, WITH LONG-TERM CURRENT USE OF INSULIN (H): Primary | ICD-10-CM

## 2018-11-08 NOTE — TELEPHONE ENCOUNTER
Pt called to schedule diabetes education with RENATO Ramirez.     Pt needs diabetes education referral entered into [facility].

## 2018-11-15 ENCOUNTER — OFFICE VISIT (OUTPATIENT)
Dept: FAMILY MEDICINE | Facility: CLINIC | Age: 53
End: 2018-11-15
Payer: COMMERCIAL

## 2018-11-15 VITALS — DIASTOLIC BLOOD PRESSURE: 78 MMHG | TEMPERATURE: 98 F | SYSTOLIC BLOOD PRESSURE: 155 MMHG

## 2018-11-15 DIAGNOSIS — Z71.84 TRAVEL ADVICE ENCOUNTER: Primary | ICD-10-CM

## 2018-11-15 DIAGNOSIS — Z23 NEED FOR VACCINATION: ICD-10-CM

## 2018-11-15 PROCEDURE — 90632 HEPA VACCINE ADULT IM: CPT | Mod: GA | Performed by: NURSE PRACTITIONER

## 2018-11-15 PROCEDURE — 90691 TYPHOID VACCINE IM: CPT | Mod: GA | Performed by: NURSE PRACTITIONER

## 2018-11-15 PROCEDURE — 90471 IMMUNIZATION ADMIN: CPT | Mod: GA | Performed by: NURSE PRACTITIONER

## 2018-11-15 PROCEDURE — 90472 IMMUNIZATION ADMIN EACH ADD: CPT | Mod: GA | Performed by: NURSE PRACTITIONER

## 2018-11-15 PROCEDURE — 99402 PREV MED CNSL INDIV APPRX 30: CPT | Mod: 25 | Performed by: NURSE PRACTITIONER

## 2018-11-15 RX ORDER — ATOVAQUONE AND PROGUANIL HYDROCHLORIDE 250; 100 MG/1; MG/1
1 TABLET, FILM COATED ORAL DAILY
Qty: 25 TABLET | Refills: 0 | Status: SHIPPED | OUTPATIENT
Start: 2018-11-15 | End: 2019-07-19

## 2018-11-15 RX ORDER — AZITHROMYCIN 500 MG/1
500 TABLET, FILM COATED ORAL DAILY
Qty: 3 TABLET | Refills: 0 | Status: SHIPPED | OUTPATIENT
Start: 2018-11-15 | End: 2019-02-11

## 2018-11-15 NOTE — PROGRESS NOTES
Nurse Note      Itinerary:  [facility]      Departure Date: 11/23/2018      Return Date: 12/08/2018      Length of Trip 2 weeks      Reason for Travel: Visiting friends and relatives           Urban or rural: urban      Accommodations: Hotel        IMMUNIZATION HISTORY  Have you received any immunizations within the past 4 weeks?  Yes  Have you ever fainted from having your blood drawn or from an injection?  No  Have you ever had a fever reaction to vaccination?  No  Have you ever had any bad reaction or side effect from any vaccination?  No  Have you ever had hepatitis A or B vaccine?  Yes  Do you live (or work closely) with anyone who has AIDS, an AIDS-like condition, any other immune disorder or who is on chemotherapy for cancer?  No  Do you have a family history of immunodeficiency?  No  Have you received any injection of immune globulin or any blood products during the past 12 months?  No    Patient roomed by MOHIT Copeland  Stewart Turcios is a 52 year old male seen today alone for counsultation for international travel to [facility] for Visiting friends and relatives.  Patient will be departing in  1 week(s) and staying for   2 week(s) and  traveling alone.      Patient itinerary :  will be in the urban region of [facility] which presents risk for Malaria and Yellow Fever. exposure.      Patient's activities will include visiting friends and relatives.    Patient[place] birth [place] [facility]    Special medical concerns: Diabetes  Pre-travel questionnaire was completed by patient and reviewed by provider.     Vitals: /78  Temp 98  F (36.7  C) (Tympanic)  BMI= There is no height or weight on file to calculate BMI.    EXAM:  General:  Well-nourished, well-developed in no acute distress.  Appears to be stated age, interacts appropriately and expresses understanding of information given to patient.    Current Outpatient Prescriptions   Medication Sig Dispense Refill     ACCU-CHEK SMARTVIEW test strip USE  TO TEST BLOOD SUGAR TWICE DAILY OR AS DIRECTED 200 strip 3     ASPIRIN 81 MG OR TABS 1 tab po QD (Once per day) 100 3     atovaquone-proguanil (MALARONE) 250-100 MG per tablet Take 1 tablet by mouth daily Start 2 days before exposure to Malaria and continue daily till  7 days after exposure. 25 tablet 0     azithromycin (ZITHROMAX) 500 MG tablet Take 1 tablet (500 mg) by mouth daily for 3 doses Take 1 tablet a day for up to 3 days for severe diarrhea 3 tablet 0     BASAGLAR 100 UNIT/ML injection INJECT 24 UNITS SUBCUTANEOUS AT BEDTIME 45 mL 1     BD MUSA U/F 32G X 4 MM insulin pen needle USE 4 TIMES DAILY OR AS DIRECTED. 360 each 3     blood glucose (ACCU-CHEK SOFTCLIX) lancing device Device to be used with lancets. 1 each 0     blood glucose monitoring (ACCU-CHEK ANDER PLUS) meter device kit Use to test blood sugars four times daily or as directed. 1 kit 0     blood glucose monitoring (ACCU-CHEK ANDER) test strip Use to test blood sugars 4 times daily or as directed. 300 each 11     blood glucose monitoring (ACCU-CHEK SMARTVIEW) test strip Use to test blood sugar two times daily or as directed. 50 each 4     blood glucose monitoring (NO BRAND SPECIFIED) test strip Use to test blood sugars 4 times daily or as directed 300 strip 3     blood glucose monitoring (SOFTCLIX) lancets Use to test blood sugar four times daily or as directed. 1 Box 3     BLOOD GLUCOSE TEST STRIPS STRP twice daily and prn qs one year     Cholecalciferol (VITAMIN D3 PO) Take 2,000 Units by mouth daily       glipiZIDE (GLUCOTROL XL) 10 MG 24 hr tablet TAKE 1 TABLET BY MOUTH ONCE DAILY 90 tablet 1     HUMALOG KWIKPEN 100 UNIT/ML soln PER SLIDING SCALE BEFORE BREAKFAST AND DINNER, UP TO 20 UNITS PER DAY. 45 mL 2     insulin glargine (LANTUS SOLOSTAR) 100 UNIT/ML injection Inject 24 Units Subcutaneous At Bedtime 6 mL 3     insulin pen needle (B-D U/F) 31G X 8 MM As directed. 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     Erectile dysfunction     Hyperlipidemia with target LDL less than 100     Benign prostatic hypertrophy with urinary frequency     Vitamin D deficiency     Hypertension goal BP (blood pressure) < 140/90     Controlled type 2 diabetes mellitus without complication, with long-term current use of insulin (H)     Allergies   Allergen Reactions     No Known Allergies          Immunizations discussed include:   Hepatitis A:  Ordered/given today, risks, benefits and side effects reviewed  Hepatitis B: declined, status unknown  Influenza: Up to date  Typhoid: Ordered/given today, risks, benefits and side effects reviewed  Rabies: Insufficient time to vaccinate  Yellow Fever: Up to date  Japanese Encephalitis: Not indicated  Meningococcus: Declined  Not concerned about risk of disease  Tetanus/Diphtheria: Up to date  Measles/Mumps/Rubella: Up to date  Cholera: Not needed  Polio: Up to date  Pneumococcal: Up to date  Varicella: Immune by disease history per patient report  Zostavax:  Not indicated  Shingrix: deferred  HPV:  Not indicated  TB:  latent    Altitude Exposure on this trip: no  Past tolerance to Altitude: na    ASSESSMENT/PLAN:    ICD-10-CM    1. Travel advice encounter Z71.89 TYPHOID VACCINE, IM     HEPA VACCINE ADULT IM     atovaquone-proguanil (MALARONE) 250-100 MG per tablet     azithromycin (ZITHROMAX) 500 MG tablet   2. Need for vaccination Z23 TYPHOID VACCINE, IM     HEPA VACCINE ADULT IM     I have reviewed general recommendations for safe travel    including: food/water precautions, insect precautions, safer sex   practices given high prevalence of Zika, HIV and other STDs,   roadway safety. Educational materials and [employer] report provided.    Malaraia prophylaxis recommended: Malarone  Symptomatic treatment for traveler's diarrhea: azithromycin  Altitude illness prevention and treatment: none      Evacuation insurance advised and resources were provided to patient.    Total visit time 30 minutes  with over 50% of time spent counseling patient as detailed above.    Tanisha Carreno CNP

## 2018-11-15 NOTE — PATIENT INSTRUCTIONS
Today November 15, 2018 you received the    Hepatitis A Vaccine - Please return on 5/14/19 or later for your 2nd and final dose.    Typhoid - injectable. This vaccine is valid for two years.   .    These appointments can be made as a NURSE ONLY visit.    **It is very important for the vaccinations to be given on the scheduled day(s), this helps ensure you receive the full effectiveness of the vaccine.**    Please call [facility] with any questions [phone]    Thank you for visiting [facility]

## 2018-11-15 NOTE — NURSING NOTE
"Chief Complaint   Patient presents with     [facility] Clinic     /78  Temp 98  F (36.7  C) (Tympanic) Estimated body mass index is 26.57 kg/(m^2) as calculated from the following:    Height as of 7/12/18: 5' 11\" (1.803 m).    Weight as of 7/12/18: 190 lb 8 oz (86.4 kg).        Sinai Forde CMA  "

## 2018-11-15 NOTE — MR AVS SNAPSHOT
After Visit Summary   11/15/2018    Stewart Turcios    MRN: [id number]           Patient Information     Date Of Birth          1965        Visit Information        Provider Department      11/15/2018 1:15 PM Tanisha Carreno APRN [facility]        Today's Diagnoses     Travel advice encounter    -  1    Need for vaccination          Care Instructions    Today November 15, 2018 you received the    Hepatitis A Vaccine - Please return on 5/14/19 or later for your 2nd and final dose.    Typhoid - injectable. This vaccine is valid for two years.   .    These appointments can be made as a NURSE ONLY visit.    **It is very important for the vaccinations to be given on the scheduled day(s), this helps ensure you receive the full effectiveness of the vaccine.**    Please call [facility] with any questions [phone]    Thank you for visiting [facility]              Follow-ups after your visit        Your next 10 appointments already scheduled     Nov 20, 2018  9:30 AM [facility]   Diabetes Education with [place] [facility] ED RESOURCE   [facility] [place] ([facility])    [address] [address] [phone]              Who to contact     If you have questions or need follow up information about today's clinic visit or your schedule please contact [facility] directly at [phone].  Normal or non-critical lab and imaging results will be communicated to you by MyChart, letter or phone within 4 business days after the clinic has received the results. If you do not hear from us within 7 days, please contact the clinic through MyChart or phone. If you have a critical or abnormal lab result, we will notify you by phone as soon as possible.  Submit refill requests through [employer] or call your pharmacy and they will forward the refill request to us. Please allow 3 business days for your refill to be  "completed.          Additional Information About Your Visit        [employer] Information     [employer] lets you send messages to your doctor, view your test results, renew your prescriptions, schedule appointments and more. To sign up, go to [email or web address] . Click on \"Log in\" on the left side of the screen, which will take you to the Welcome page. Then click on \"Sign up Now\" on the right side of the page.     You will be asked to enter the access code listed below, as well as some personal information. Please follow the directions to create your username and password.     Your access code is: [email or web address]  Expires: 2019  2:23 PM     Your access code will  in 90 days. If you need help or a new code, please call your [facility] clinic or [phone].        Care EveryWhere ID     This is your Care EveryWhere ID. This could be used by other organizations to access your [facility] medical records  [id number]        Your Vitals Were     Temperature                   98  F (36.7  C) (Tympanic)            Blood Pressure from Last 3 Encounters:   11/15/18 155/78   18 142/62   18 154/82    Weight from Last 3 Encounters:   18 190 lb 8 oz (86.4 kg)   18 191 lb (86.6 kg)   18 193 lb 8 oz (87.8 kg)              We Performed the Following     HEPA VACCINE ADULT IM     TYPHOID VACCINE, IM          Today's Medication Changes          These changes are accurate as of 11/15/18  1:42 PM.  If you have any questions, ask your nurse or doctor.               Start taking these medicines.        Dose/Directions    atovaquone-proguanil 250-100 MG per tablet   Commonly known as:  MALARONE   Used for:  Travel advice encounter   Started by:  Tanisha Carreno APRN CNP        Dose:  1 tablet   Take 1 tablet by mouth daily Start 2 days before exposure to Malaria and continue daily till  7 days after exposure.   Quantity:  25 tablet   Refills:  0       azithromycin 500 MG tablet   Commonly known as:  " ZITHROMAX   Used for:  Travel advice encounter   Started by:  Tanisha Carreno APRN CNP        Dose:  500 mg   Take 1 tablet (500 mg) by mouth daily for 3 doses Take 1 tablet a day for up to 3 days for severe diarrhea   Quantity:  3 tablet   Refills:  0            Where to get your medicines      These medications were sent to [facility]/pharmacy #[id number] - [place], [place] - [address]  [address] , [place] [address]     Phone:  [phone]     atovaquone-proguanil 250-100 MG per tablet    azithromycin 500 MG tablet                Primary Care Provider Office Phone # Fax #    Anahi Jean [phone] [address] [place] [address]        Equal Access to Services     [facility] : Hadii roman packero Sodomenico, waaxda luqadaha, qaybta kaalmada adeegyada, waxay laure briscoe . So [facility] [phone].    ATENCIÓN: Si habla español, tiene a lindsay disposición servicios gratuitos de asistencia lingüística. [facility] [phone].    We comply with applicable federal civil rights laws and [place] laws. We do not discriminate on the basis of race, color, national origin, age, disability, sex, sexual orientation, or gender identity.            Thank you!     Thank you for choosing [facility]  for your care. Our goal is always to provide you with excellent care. Hearing back from our patients is one way we can continue to improve our services. Please take a few minutes to complete the written survey that you may receive in the mail after your visit with us. Thank you!             Your Updated Medication List - Protect others around you: Learn how to safely use, store and throw away your medicines at www.disposemymeds.org.          This list is accurate as of 11/15/18  1:42 PM.  Always use your most recent med list.                   Brand Name Dispense Instructions for use Diagnosis    aspirin 81 MG tablet     100    1 tab po QD (Once per day)     Type II or unspecified type diabetes mellitus without mention of complication, not stated as uncontrolled       atovaquone-proguanil 250-100 MG per tablet    MALARONE    25 tablet    Take 1 tablet by mouth daily Start 2 days before exposure to Malaria and continue daily till  7 days after exposure.    Travel advice encounter       azithromycin 500 MG tablet    ZITHROMAX    3 tablet    Take 1 tablet (500 mg) by mouth daily for 3 doses Take 1 tablet a day for up to 3 days for severe diarrhea    Travel advice encounter       blood glucose lancing device     1 each    Device to be used with lancets.    Diabetes mellitus type 2, controlled, without complications (H)       blood glucose monitoring lancets     1 Box    Use to test blood sugar four times daily or as directed.    Diabetes mellitus type 2, controlled, without complications (H)       * blood glucose monitoring test strip    ACCU-CHEK SMARTVIEW    50 each    Use to test blood sugar two times daily or as directed.    Diabetes mellitus type 2, controlled, without complications (H)       * blood glucose monitoring test strip    ACCU-CHEK ANDER    300 each    Use to test blood sugars 4 times daily or as directed.    Uncontrolled type II diabetes mellitus (H)       * blood glucose monitoring test strip    no brand specified    300 strip    Use to test blood sugars 4 times daily or as directed    Controlled type 2 diabetes mellitus without complication, with long-term current use of insulin (H)       * ACCU-CHEK SMARTVIEW test strip   Generic drug:  blood glucose monitoring     200 strip    USE TO TEST BLOOD SUGAR TWICE DAILY OR AS DIRECTED    Diabetes mellitus type 2, controlled, without complications (H)       * BLOOD GLUCOSE TEST STRIPS STRP     qs    twice daily and prn    Type II or unspecified type diabetes mellitus without mention of complication, not stated as uncontrolled       * blood glucose monitoring meter device kit     1 kit    Use to test blood sugars  four times daily or as directed.    Controlled type 2 diabetes mellitus without complication, with long-term current use of insulin (H)       glipiZIDE 10 MG 24 hr tablet    GLUCOTROL XL    90 tablet    TAKE 1 TABLET BY MOUTH ONCE DAILY    Controlled type 2 diabetes mellitus without complication, with long-term current use of insulin (H)       HumaLOG KWIKpen 100 UNIT/ML injection   Generic drug:  insulin lispro     45 mL    PER SLIDING SCALE BEFORE BREAKFAST AND DINNER, UP TO 20 UNITS PER DAY.    Controlled type 2 diabetes mellitus without complication, with long-term current use of insulin (H)       * insulin glargine 100 UNIT/ML injection    LANTUS SOLOSTAR    6 mL    Inject 24 Units Subcutaneous At Bedtime    Controlled type 2 diabetes mellitus without complication, with long-term current use of insulin (H)       * BASAGLAR 100 UNIT/ML injection     45 mL    INJECT 24 UNITS SUBCUTANEOUS AT BEDTIME    Controlled type 2 diabetes mellitus without complication, with long-term current use of insulin (H)       * insulin pen needle 31G X 8 MM    B-D U/F    100 each    As directed.    Uncontrolled type II diabetes mellitus (H)       * BD MSUA U/F 32G X 4 MM   Generic drug:  insulin pen needle     360 each    USE 4 TIMES DAILY OR AS DIRECTED.    Controlled type 2 diabetes mellitus without complication, with long-term current use of insulin (H)       losartan 50 MG tablet    COZAAR    90 tablet    TAKE 1 TABLET (50 MG) BY MOUTH DAILY    Essential hypertension with goal blood pressure less than 140/90       metFORMIN 500 MG tablet    GLUCOPHAGE    360 tablet    TAKE 2 TABLETS (1,000 MG) BY MOUTH 2 TIMES DAILY (WITH MEALS)    Controlled type 2 diabetes mellitus without complication, unspecified long term insulin use status       MULTIVITAMIN PO           sildenafil 20 MG tablet    REVATIO    90 tablet    Take 1 tablet (20 mg) by mouth once as needed    Drug-induced erectile dysfunction       simvastatin 10 MG tablet     ZOCOR    90 tablet    TAKE 1 TABLET AT BEDTIME    Hyperlipidemia LDL goal <100       VITAMIN D3 PO      Take 2,000 Units by mouth daily        * Notice:  This list has 10 medication(s) that are the same as other medications prescribed for you. Read the directions carefully, and ask your doctor or other care provider to review them with you.

## 2018-11-20 ENCOUNTER — TELEPHONE (OUTPATIENT)
Dept: EDUCATION SERVICES | Facility: CLINIC | Age: 53
End: 2018-11-20

## 2018-11-20 ENCOUNTER — ALLIED HEALTH/NURSE VISIT (OUTPATIENT)
Dept: NURSING | Facility: CLINIC | Age: 53
End: 2018-11-20
Payer: COMMERCIAL

## 2018-11-20 ENCOUNTER — TELEPHONE (OUTPATIENT)
Dept: FAMILY MEDICINE | Facility: CLINIC | Age: 53
End: 2018-11-20

## 2018-11-20 ENCOUNTER — ALLIED HEALTH/NURSE VISIT (OUTPATIENT)
Dept: EDUCATION SERVICES | Facility: CLINIC | Age: 53
End: 2018-11-20
Payer: COMMERCIAL

## 2018-11-20 VITALS — HEART RATE: 86 BPM | OXYGEN SATURATION: 100 % | SYSTOLIC BLOOD PRESSURE: 158 MMHG | DIASTOLIC BLOOD PRESSURE: 80 MMHG

## 2018-11-20 DIAGNOSIS — F41.9 ANXIETY: Primary | ICD-10-CM

## 2018-11-20 DIAGNOSIS — Z79.4 CONTROLLED TYPE 2 DIABETES MELLITUS WITHOUT COMPLICATION, WITH LONG-TERM CURRENT USE OF INSULIN (H): Primary | ICD-10-CM

## 2018-11-20 DIAGNOSIS — E11.9 CONTROLLED TYPE 2 DIABETES MELLITUS WITHOUT COMPLICATION, WITH LONG-TERM CURRENT USE OF INSULIN (H): Primary | ICD-10-CM

## 2018-11-20 PROCEDURE — G0108 DIAB MANAGE TRN  PER INDIV: HCPCS

## 2018-11-20 PROCEDURE — 99212 OFFICE O/P EST SF 10 MIN: CPT

## 2018-11-20 RX ORDER — LORAZEPAM 0.5 MG/1
0.25-0.5 TABLET ORAL 2 TIMES DAILY PRN
Qty: 10 TABLET | Refills: 0 | Status: CANCELLED | OUTPATIENT
Start: 2018-11-20

## 2018-11-20 NOTE — NURSING NOTE
"Patient presents to clinic today a walk in due to travel anxiety.  Patient reports that he \"feels nervous/anxious/jittery\" after flying in from [place]. Patient is flying again later this week to [place] and is requesting a medication to help ease travel anxiety.  Vitals obtained. Will discuss with Dr. Jean.    Madeleine Combs RN    "

## 2018-11-20 NOTE — TELEPHONE ENCOUNTER
Hi Dr. Jean,     Please note if you approve of this plan or indicate an alternate plan.     I don't have any blood sugars as Stewart forgot his glucometer, but based on his detailed recall of blood sugars and diet would you be okay with these following changes:      Decrease Lantus from 26 to 23 units    Change sliding scale insulin into a pre-meal correction 1:35 > 150 and separate from food coverage.      Breakfast: 1 unit of Humalog for every 10 grams of carbohydrate (most breakfasts will be 6 to 7 units + correction if needed).  This will lessen breakfast humalog by ~9 units, however additional humalog can be added at lunch and dinner    Lunch & dinner small meal: 2 units, medium meal: 3 units, large meal: 4 units    Okay to order FreeStyle Niharika continuous glucose monitor       Thanks!  Mora Ramirez RD, LD, CDE  Diabetes Education

## 2018-11-20 NOTE — MR AVS SNAPSHOT
After Visit Summary   11/20/2018    Stewart Turcios    MRN: [id number]           Patient Information     Date Of Birth          1965        Visit Information        Provider Department      11/20/2018 9:30 AM [place] [facility] ED RESOURCE [facility] [place]        Today's Diagnoses     Controlled type 2 diabetes mellitus without complication, with long-term current use of insulin (H)    -  1      Care Instructions    Coffee - no insulin     Bagel: 60 grams of carbohydrate     Bowl of cereal  Religion granola, almond, raisins etc:  1 cup = 70 grams carbohydrate     Glucose tabs    Travel - keep all m eds & testing supplies with you  Charisse Ramirez RD, LD, CDE  For Diabetes Education appointments call: [phone]   For Diabetes Education Related Questions call: [phone] or email: [email or web address]            Follow-ups after your visit        Your next 10 appointments already scheduled     Dec 13, 2018  1:00 PM CST   Diabetes Education with  DIABETIC ED RESOURCE   [facility] ([facility])    [address] [address] [phone]              Who to contact     If you have questions or need follow up information about today's clinic visit or your schedule please contact [place] [facility] directly at [phone].  Normal or non-critical lab and imaging results will be communicated to you by MyChart, letter or phone within 4 business days after the clinic has received the results. If you do not hear from us within 7 days, please contact the clinic through MyChart or phone. If you have a critical or abnormal lab result, we will notify you by phone as soon as possible.  Submit refill requests through [employer] or call your pharmacy and they will forward the refill request to us. Please allow 3 business days for your refill to be completed.          Additional Information About Your Visit       "  MyChart Information     [employer] lets you send messages to your doctor, view your test results, renew your prescriptions, schedule appointments and more. To sign up, go to [email or web address] . Click on \"Log in\" on the left side of the screen, which will take you to the Welcome page. Then click on \"Sign up Now\" on the right side of the page.     You will be asked to enter the access code listed below, as well as some personal information. Please follow the directions to create your username and password.     Your access code is: [email or web address]  Expires: 2019  2:23 PM     Your access code will  in 90 days. If you need help or a new code, please call your [place] clinic or [phone].        Care EveryWhere ID     This is your Care EveryWhere ID. This could be used by other organizations to access your [place] medical records  [id number]         Blood Pressure from Last 3 Encounters:   11/15/18 155/78   18 142/62   18 154/82    Weight from Last 3 Encounters:   18 86.4 kg (190 lb 8 oz)   18 86.6 kg (191 lb)   18 87.8 kg (193 lb 8 oz)              Today, you had the following     No orders found for display         Today's Medication Changes          These changes are accurate as of 18 10:27 AM.  If you have any questions, ask your nurse or doctor.               These medicines have changed or have updated prescriptions.        Dose/Directions    blood glucose monitoring meter device kit   This may have changed:  Another medication with the same name was removed. Continue taking this medication, and follow the directions you see here.   Used for:  Controlled type 2 diabetes mellitus without complication, with long-term current use of insulin (H)        Use to test blood sugars four times daily or as directed.   Quantity:  1 kit   Refills:  0       * insulin pen needle 31G X 8 MM miscellaneous   Commonly known as:  B-D U/F   This may have changed:  Another medication " with the same name was changed. Make sure you understand how and when to take each.   Used for:  Uncontrolled type II diabetes mellitus (H)        As directed.   Quantity:  100 each   Refills:  11       * insulin pen needle 32G X 4 MM miscellaneous   Commonly known as:  BD MUSA U/F   This may have changed:  See the new instructions.   Used for:  Controlled type 2 diabetes mellitus without complication, with long-term current use of insulin (H)        Use 4 pen needles daily   Quantity:  360 each   Refills:  11       * Notice:  This list has 2 medication(s) that are the same as other medications prescribed for you. Read the directions carefully, and ask your doctor or other care provider to review them with you.         Where to get your medicines      These medications were sent to [facility]/pharmacy #[id number] - [place] - [address]  [address], [place] [address]     Phone:  [phone]     insulin pen needle 32G X 4 MM miscellaneous                Primary Care Provider Office Phone # Fax #    Anahi Jean [phone] [address] [place] [address]        Equal Access to Services     [facility]: Hadii aad ku hadasho Soomaali, waaxda luqadaha, qaybta kaalmada adeegyaruss, sg briscoe . So [facility] [phone].    ATENCIÓN: Si habla español, tiene a lindsay disposición servicios gratuitos de asistencia lingüística. Thomas al [phone].    We comply with applicable federal civil rights laws and [place] laws. We do not discriminate on the basis of race, color, national origin, age, disability, sex, sexual orientation, or gender identity.            Thank you!     Thank you for choosing [place] [facility]  for your care. Our goal is always to provide you with excellent care. Hearing back from our patients is one way we can continue to improve our services. Please take a few minutes to complete the written survey that you may  receive in the mail after your visit with us. Thank you!             Your Updated Medication List - Protect others around you: Learn how to safely use, store and throw away your medicines at www.disposemymeds.org.          This list is accurate as of 11/20/18 10:27 AM.  Always use your most recent med list.                   Brand Name Dispense Instructions for use Diagnosis    aspirin 81 MG tablet     100    1 tab po QD (Once per day)    Type II or unspecified type diabetes mellitus without mention of complication, not stated as uncontrolled       atovaquone-proguanil 250-100 MG per tablet    MALARONE    25 tablet    Take 1 tablet by mouth daily Start 2 days before exposure to Malaria and continue daily till  7 days after exposure.    Travel advice encounter       blood glucose lancing device     1 each    Device to be used with lancets.    Diabetes mellitus type 2, controlled, without complications (H)       blood glucose monitoring lancets     1 Box    Use to test blood sugar four times daily or as directed.    Diabetes mellitus type 2, controlled, without complications (H)       blood glucose monitoring meter device kit     1 kit    Use to test blood sugars four times daily or as directed.    Controlled type 2 diabetes mellitus without complication, with long-term current use of insulin (H)       * blood glucose monitoring test strip    ACCU-CHEK SMARTVIEW    50 each    Use to test blood sugar two times daily or as directed.    Diabetes mellitus type 2, controlled, without complications (H)       * blood glucose monitoring test strip    ACCU-CHEK ANDER    300 each    Use to test blood sugars 4 times daily or as directed.    Uncontrolled type II diabetes mellitus (H)       * blood glucose monitoring test strip    no brand specified    300 strip    Use to test blood sugars 4 times daily or as directed    Controlled type 2 diabetes mellitus without complication, with long-term current use of insulin (H)       *  ACCU-CHEK SMARTVIEW test strip   Generic drug:  blood glucose monitoring     200 strip    USE TO TEST BLOOD SUGAR TWICE DAILY OR AS DIRECTED    Diabetes mellitus type 2, controlled, without complications (H)       glipiZIDE 10 MG 24 hr tablet    GLUCOTROL XL    90 tablet    TAKE 1 TABLET BY MOUTH ONCE DAILY    Controlled type 2 diabetes mellitus without complication, with long-term current use of insulin (H)       HumaLOG KWIKpen 100 UNIT/ML injection   Generic drug:  insulin lispro     45 mL    PER SLIDING SCALE BEFORE BREAKFAST AND DINNER, UP TO 20 UNITS PER DAY.    Controlled type 2 diabetes mellitus without complication, with long-term current use of insulin (H)       * insulin glargine 100 UNIT/ML injection    LANTUS SOLOSTAR    6 mL    Inject 24 Units Subcutaneous At Bedtime    Controlled type 2 diabetes mellitus without complication, with long-term current use of insulin (H)       * insulin glargine 100 UNIT/ML pen     45 mL    INJECT 24 UNITS SUBCUTANEOUS AT BEDTIME    Controlled type 2 diabetes mellitus without complication, with long-term current use of insulin (H)       * insulin pen needle 31G X 8 MM miscellaneous    B-D U/F    100 each    As directed.    Uncontrolled type II diabetes mellitus (H)       * insulin pen needle 32G X 4 MM miscellaneous    BD MUSA U/F    360 each    Use 4 pen needles daily    Controlled type 2 diabetes mellitus without complication, with long-term current use of insulin (H)       losartan 50 MG tablet    COZAAR    90 tablet    TAKE 1 TABLET (50 MG) BY MOUTH DAILY    Essential hypertension with goal blood pressure less than 140/90       metFORMIN 500 MG tablet    GLUCOPHAGE    360 tablet    TAKE 2 TABLETS (1,000 MG) BY MOUTH 2 TIMES DAILY (WITH MEALS)    Controlled type 2 diabetes mellitus without complication, unspecified long term insulin use status       MULTIVITAMIN PO           sildenafil 20 MG tablet    REVATIO    90 tablet    Take 1 tablet (20 mg) by mouth once as  needed    Drug-induced erectile dysfunction       simvastatin 10 MG tablet    ZOCOR    90 tablet    TAKE 1 TABLET AT BEDTIME    Hyperlipidemia LDL goal <100       VITAMIN D3 PO      Take 2,000 Units by mouth daily        * Notice:  This list has 8 medication(s) that are the same as other medications prescribed for you. Read the directions carefully, and ask your doctor or other care provider to review them with you.

## 2018-11-20 NOTE — NURSING NOTE
Discussed with Dr. Jean who gave a verbal order for ativan.Discussed with pharmacist at [facility] who will fill for patient.    Madeleine Combs RN

## 2018-11-20 NOTE — MR AVS SNAPSHOT
"              After Visit Summary   11/20/2018    Stewart Turcios    MRN: [id number]           Patient Information     Date Of Birth          1965        Visit Information        Provider Department      11/20/2018 11:00 AM [facility] RN [facility]        Today's Diagnoses     Anxiety    -  1       Follow-ups after your visit        Your next 10 appointments already scheduled     Dec 13, 2018  1:00 PM CST   Diabetes Education with [facility] DIABETIC ED RESOURCE   [place] [facility] [place] ([facility])    [address] [id number] [phone]              Who to contact     If you have questions or need follow up information about today's clinic visit or your schedule please contact [facility] directly at [phone].  Normal or non-critical lab and imaging results will be communicated to you by MyChart, letter or phone within 4 business days after the clinic has received the results. If you do not hear from us within 7 days, please contact the clinic through MyChart or phone. If you have a critical or abnormal lab result, we will notify you by phone as soon as possible.  Submit refill requests through [employer] or call your pharmacy and they will forward the refill request to us. Please allow 3 business days for your refill to be completed.          Additional Information About Your Visit        [employer] Information     [employer] lets you send messages to your doctor, view your test results, renew your prescriptions, schedule appointments and more. To sign up, go to [email or web address] . Click on \"Log in\" on the left side of the screen, which will take you to the Welcome page. Then click on \"Sign up Now\" on the right side of the page.     You will be asked to enter the access code listed below, as well as some personal information. Please follow the directions to create your username and password.     Your access code is: " [email or web address]  Expires: 2019  2:23 PM     Your access code will  in 90 days. If you need help or a new code, please call your [place] clinic or [phone].        Care EveryWhere ID     This is your Care EveryWhere ID. This could be used by other organizations to access your [place] medical records  [id number]        Your Vitals Were     Pulse Pulse Oximetry                86 100%           Blood Pressure from Last 3 Encounters:   18 158/80   11/15/18 155/78   18 142/62    Weight from Last 3 Encounters:   18 190 lb 8 oz (86.4 kg)   18 191 lb (86.6 kg)   18 193 lb 8 oz (87.8 kg)              Today, you had the following     No orders found for display         Today's Medication Changes          These changes are accurate as of 18 12:42 PM.  If you have any questions, ask your nurse or doctor.               These medicines have changed or have updated prescriptions.        Dose/Directions    blood glucose monitoring meter device kit   This may have changed:  Another medication with the same name was removed. Continue taking this medication, and follow the directions you see here.   Used for:  Controlled type 2 diabetes mellitus without complication, with long-term current use of insulin (H)        Use to test blood sugars four times daily or as directed.   Quantity:  1 kit   Refills:  0       * insulin pen needle 31G X 8 MM miscellaneous   Commonly known as:  B-D U/F   This may have changed:  Another medication with the same name was changed. Make sure you understand how and when to take each.   Used for:  Uncontrolled type II diabetes mellitus (H)        As directed.   Quantity:  100 each   Refills:  11       * insulin pen needle 32G X 4 MM miscellaneous   Commonly known as:  BD MUSA U/F   This may have changed:  See the new instructions.   Used for:  Controlled type 2 diabetes mellitus without complication, with long-term current use of insulin (H)        Use 4 pen  needles daily   Quantity:  360 each   Refills:  11       * Notice:  This list has 2 medication(s) that are the same as other medications prescribed for you. Read the directions carefully, and ask your doctor or other care provider to review them with you.         Where to get your medicines      These medications were sent to [facility]/pharmacy #[id number] - [place], [place] - [address]  [address], [place] [address]     Phone:  [phone]     insulin pen needle 32G X 4 MM miscellaneous                Primary Care Provider Office Phone # Fax #    Anahi Jean [phone] [address] [place] [address]        Equal Access to Services     [facility]: Hadii roman Choi, waaxda wilton, qaybta kaalmada eve, sg briscoe . So [facility] [phone].    ATENCIÓN: Si habla español, tiene a lindsay disposición servicios gratuitos de asistencia lingüística. Llame al [phone].    We comply with applicable federal civil rights laws and [place] laws. We do not discriminate on the basis of race, color, national origin, age, disability, sex, sexual orientation, or gender identity.            Thank you!     Thank you for choosing [facility]  for your care. Our goal is always to provide you with excellent care. Hearing back from our patients is one way we can continue to improve our services. Please take a few minutes to complete the written survey that you may receive in the mail after your visit with us. Thank you!             Your Updated Medication List - Protect others around you: Learn how to safely use, store and throw away your medicines at www.disposemymeds.org.          This list is accurate as of 11/20/18 12:42 PM.  Always use your most recent med list.                   Brand Name Dispense Instructions for use Diagnosis    aspirin 81 MG tablet     100    1 tab po QD (Once per day)    Type II or unspecified type  diabetes mellitus without mention of complication, not stated as uncontrolled       atovaquone-proguanil 250-100 MG per tablet    MALARONE    25 tablet    Take 1 tablet by mouth daily Start 2 days before exposure to Malaria and continue daily till  7 days after exposure.    Travel advice encounter       blood glucose lancing device     1 each    Device to be used with lancets.    Diabetes mellitus type 2, controlled, without complications (H)       blood glucose monitoring lancets     1 Box    Use to test blood sugar four times daily or as directed.    Diabetes mellitus type 2, controlled, without complications (H)       blood glucose monitoring meter device kit     1 kit    Use to test blood sugars four times daily or as directed.    Controlled type 2 diabetes mellitus without complication, with long-term current use of insulin (H)       * blood glucose monitoring test strip    ACCU-CHEK SMARTVIEW    50 each    Use to test blood sugar two times daily or as directed.    Diabetes mellitus type 2, controlled, without complications (H)       * blood glucose monitoring test strip    ACCU-CHEK ANDER    300 each    Use to test blood sugars 4 times daily or as directed.    Uncontrolled type II diabetes mellitus (H)       * blood glucose monitoring test strip    no brand specified    300 strip    Use to test blood sugars 4 times daily or as directed    Controlled type 2 diabetes mellitus without complication, with long-term current use of insulin (H)       * ACCU-CHEK SMARTVIEW test strip   Generic drug:  blood glucose monitoring     200 strip    USE TO TEST BLOOD SUGAR TWICE DAILY OR AS DIRECTED    Diabetes mellitus type 2, controlled, without complications (H)       glipiZIDE 10 MG 24 hr tablet    GLUCOTROL XL    90 tablet    TAKE 1 TABLET BY MOUTH ONCE DAILY    Controlled type 2 diabetes mellitus without complication, with long-term current use of insulin (H)       HumaLOG KWIKpen 100 UNIT/ML injection   Generic drug:   insulin lispro     45 mL    PER SLIDING SCALE BEFORE BREAKFAST AND DINNER, UP TO 20 UNITS PER DAY.    Controlled type 2 diabetes mellitus without complication, with long-term current use of insulin (H)       * insulin glargine 100 UNIT/ML injection    LANTUS SOLOSTAR    6 mL    Inject 24 Units Subcutaneous At Bedtime    Controlled type 2 diabetes mellitus without complication, with long-term current use of insulin (H)       * insulin glargine 100 UNIT/ML pen     45 mL    INJECT 24 UNITS SUBCUTANEOUS AT BEDTIME    Controlled type 2 diabetes mellitus without complication, with long-term current use of insulin (H)       * insulin pen needle 31G X 8 MM miscellaneous    B-D U/F    100 each    As directed.    Uncontrolled type II diabetes mellitus (H)       * insulin pen needle 32G X 4 MM miscellaneous    BD MUSA U/F    360 each    Use 4 pen needles daily    Controlled type 2 diabetes mellitus without complication, with long-term current use of insulin (H)       losartan 50 MG tablet    COZAAR    90 tablet    TAKE 1 TABLET (50 MG) BY MOUTH DAILY    Essential hypertension with goal blood pressure less than 140/90       metFORMIN 500 MG tablet    GLUCOPHAGE    360 tablet    TAKE 2 TABLETS (1,000 MG) BY MOUTH 2 TIMES DAILY (WITH MEALS)    Controlled type 2 diabetes mellitus without complication, unspecified long term insulin use status       MULTIVITAMIN PO           sildenafil 20 MG tablet    REVATIO    90 tablet    Take 1 tablet (20 mg) by mouth once as needed    Drug-induced erectile dysfunction       simvastatin 10 MG tablet    ZOCOR    90 tablet    TAKE 1 TABLET AT BEDTIME    Hyperlipidemia LDL goal <100       VITAMIN D3 PO      Take 2,000 Units by mouth daily        * Notice:  This list has 8 medication(s) that are the same as other medications prescribed for you. Read the directions carefully, and ask your doctor or other care provider to review them with you.

## 2018-11-20 NOTE — PATIENT INSTRUCTIONS
Coffee - no insulin     Bagel: 60 grams of carbohydrate     Bowl of cereal  Gnosticism granola, almond, raisins etc:  1 cup = 70 grams carbohydrate     Glucose tabs    Travel - keep all m eds & testing supplies with you  Charisse Ramirez RD, LD, CDE  For Diabetes Education appointments call: [phone]   For Diabetes Education Related Questions call: [phone] or email: [email or web address]

## 2018-11-20 NOTE — PROGRESS NOTES
"Diabetes Self-Management Education & Support    Diabetes Education Self Management & Training    SUBJECTIVE/OBJECTIVE:  Presents for: Individual review  Accompanied by: Self  Focus of Visit: Taking Medication  Diabetes type: Type 2.  Diagnosis age 31, started insulin in his 40s.   Disease course: Stable  How confident are you filling out medical forms by yourself:: Extremely  Transportation concerns: No  Other concerns:: None  Cultural Influences/Ethnic Background:  Choose not to answer      Diabetes Symptoms & Complications  Not assessed at this visit      Patient Problem List and Family Medical History reviewed for relevant medical history, current medical status, and diabetes risk factors.    Vitals:  There were no vitals taken for this visit.  Estimated body mass index is 26.57 kg/(m^2) as calculated from the following:    Height as of 7/12/18: 1.803 m (5' 11\").    Weight as of 7/12/18: 86.4 kg (190 lb 8 oz).   Last 3 BP:   BP Readings from Last 3 Encounters:   11/20/18 158/80   11/15/18 155/78   07/12/18 142/62       History   Smoking Status     Never Smoker   Smokeless Tobacco     Never Used       Labs:  Lab Results   Component Value Date    A1C 8.3 11/07/2018     Lab Results   Component Value Date     07/23/2018     Lab Results   Component Value Date    LDL 70 07/23/2018     HDL Cholesterol   Date Value Ref Range Status   07/23/2018 41 >39 mg/dL Final   ]  GFR Estimate   Date Value Ref Range Status   07/23/2018 >90 >60 mL/min/1.7m2 Final     Comment:     Non  GFR Calc     GFR Estimate If Black   Date Value Ref Range Status   07/23/2018 >90 >60 mL/min/1.7m2 Final     Comment:      GFR Calc     Lab Results   Component Value Date    CR 0.87 07/23/2018     No results found for: MICROALBUMIN    Healthy Eating  Healthy Eating Assessed Today: Yes  Breakfast: plain coffee  OR bowl of cereal   Lunch: often skips or hamburger  Dinner: larger meal; eats most foods before bed and is " waking up higher  Steak or potatoes, yam, cassava, rice meat/starch/veggies    Being Active  Being Active Assessed Today: Yes    Monitoring  Monitoring Assessed Today: Yes  Did patient bring glucose meter to appointment? : No  Low Glucose Range (mg/dL):   High Glucose Range (mg/dL): >200  Overall Range (mg/dL): 180-200  Fasting: > 200  Before lunch/dinner:      Taking Medications  Diabetes Medication(s)     Biguanides Sig    metFORMIN (GLUCOPHAGE) 500 MG tablet TAKE 2 TABLETS (1,000 MG) BY MOUTH 2 TIMES DAILY (WITH MEALS)    Insulin Sig    BASAGLAR 100 UNIT/ML injection INJECT 24 UNITS SUBCUTANEOUS AT BEDTIME    HUMALOG KWIKPEN 100 UNIT/ML soln PER SLIDING SCALE BEFORE BREAKFAST AND DINNER, UP TO 20 UNITS PER DAY.    insulin glargine (LANTUS SOLOSTAR) 100 UNIT/ML injection Inject 24 Units Subcutaneous At Bedtime    Sulfonylureas Sig    glipiZIDE (GLUCOTROL XL) 10 MG 24 hr tablet TAKE 1 TABLET BY MOUTH ONCE DAILY        Basaglar 0-0-0-26 (just increased from 24)   Metformin - no issues   Humalog: sliding scale only before breakfast   150-175 5 units  176-200 1 units  200-225 15 units  226+ 20 units    Taking Medication Assessed Today: Yes    Problem Solving  Problem Solving Assessed Today: Yes  Hypoglycemia Frequency: Sometimes shaky in the 60s  Treats hypoglycemia with other food, juice, candy   Patient carries a carbohydrate source:  (advised to do so)    Reducing Risks  Reducing Risks Assessed Today: No    Healthy Coping  Healthy Coping Assessed Today: Yes  Emotional response to diabetes: Ready to learn  Patient Activation Measure Survey Score:  GAEL Score (Last Two) 4/5/2013   GAEL Raw Score 45   Activation Score 73.1   GAEL Level 4     Patient's most recent   Lab Results   Component Value Date    A1C 8.3 11/07/2018    is not meeting goal of <7.0    INTERVENTION:   Patient is taking all the Humalog in the morning with higher pre breakfast readings.  Fasting blood sugars appear to be elevated due to lack  of humalog coverage for dinner.  Patient sometimes is having mid-day suspected hypoglycemia due to a higher dose of Humalog in the morning with or without food.  Discussed insulin action, basal versus bolus and correction versus mealtime coverage.  Patient was able to give a detailed record of blood sugars despite not having his glucometer with.  Recommend splitting up the correction insulin from the mealtime insulin and covering all carbohydrates with an insulin to carbohydrate ratio.  Reviewed carbohydrate counting, label reading and looking up carbohydrate counts online.  Pt verbalized understanding of concepts discussed and recommendations provided.      Telephone encounter to PCP:   Decrease Lantus from 26 to 23 units     Change sliding scale insulin into a pre-meal correction 1:35 > 150 and separate from food coverage.      Breakfast: 1 unit of Humalog for every 10 grams of carbohydrate (most breakfasts will be 6 to 7 units + correction if needed).  This will lessen breakfast humalog by ~9 units, however additional humalog can be added at lunch and dinner     Lunch & dinner small meal: 2 units, medium meal: 3 units, large meal: 4 units.  Then can increase and use the insulin to carbohydrate ratio if needed.       Diabetes knowledge and skills assessment:     Patient is knowledgeable in diabetes management concepts related to: Healthy Eating, Being Active, Monitoring, Taking Medication, Problem Solving, Reducing Risks and Healthy Coping    Patient needs further education on the following diabetes management concepts: Monitoring and Taking Medication    Based on learning assessment above, most appropriate setting for further diabetes education would be: Individual setting.    Education provided today on:  AADE Self-Care Behaviors:  Healthy Eating: carbohydrate counting, consistency in amount, composition, and timing of food intake and label reading  Being Active: relationship to blood glucose  Monitoring: log  and interpret results, individual blood glucose targets and frequency of monitoring  Taking Medication: action of prescribed medication, side effects of prescribed medications and when to take medications  Problem Solving: high blood glucose - causes, signs/symptoms, treatment and prevention, low blood glucose - causes, signs/symptoms, treatment and prevention, carrying a carbohydrate source at all times, safe travel and when to call health care provider    Opportunities for ongoing education and support in diabetes-self management were discussed.  Pt verbalized understanding of concepts discussed and recommendations provided today.       Education Materials Provided:  BG Log Sheet and Carbohydrate Counting    PLAN:  See Patient Instructions for co-developed, patient-stated behavior change goals.  AVS printed and provided to patient today. See Follow-Up section for recommended follow-up.  Follow up 12/13    Charisse Ramirez RD, LD, CDE  Diabetes Education    Time Spent: 60 minutes  Encounter Type: Individual    Telephone encounter sent to PCP

## 2018-11-20 NOTE — TELEPHONE ENCOUNTER
Patient presents to clinic as a walk in to discuss travel anxiety.  Patient reports feeling very anxious/nervous/jittery since flying in from [place]. Patient has to fly again at the end of the week to [place] and is worried that he will experience worsening travel anxiety.  Vitals obtained. Discussed with Dr. Jean who ordered ativan PRN. Order given to Malcolm Ledesma pharmacist.    Madeleine Combs RN

## 2018-11-21 NOTE — TELEPHONE ENCOUNTER
Patient updated via e-mail as planned.  Consent to e-mail securely signed for [email or web address]     E-mail sent to patient   Dr. Miranda Calvin approved the plan we discussed yesterday.       1.  Decrease Lantus from 26 to 23 units the night before you begin these following Humalog changes.     Pre meal correction to be used at breakfast, lunch and dinner if checking a blood sugar.  Only use correction if meals are around 4 or more hours apart.     Pre meal blood sugar Additional unit of Humalog to add to meal dose   150-185 1 units   186-220 2 units   221-255 3 units   256+ 4 units      Meal time doses:    Breakfast - take 1 unit of Humalog for every 10 grams of carbohydrate you eat.    Examples  Bagel: 60 grams carb = 6 units + correction if needed  Bowl of cereal (the granola that you eat) 70 grams carb = 7 units + correction if needed    Lunch & Dinner:   Small meal (30-45 grams carb): 2 units Humalog + correction if needed  Medium Meal (46-60gm carb): 3 units Humalog + correction if needed  Large Meal (61+ grams carb): 4 units Humalog + correction if needed     If you do not take a pre-meal blood sugar then you can skip the correction dose and just take the normal mealtime dose.     Have a great week!   Please let us know if you have any questions.       Charisse Ramirez RD, LD, CDE  Diabetes Education

## 2018-12-11 DIAGNOSIS — Z79.4 CONTROLLED TYPE 2 DIABETES MELLITUS WITHOUT COMPLICATION, WITH LONG-TERM CURRENT USE OF INSULIN (H): ICD-10-CM

## 2018-12-11 DIAGNOSIS — E78.5 HYPERLIPIDEMIA LDL GOAL <100: ICD-10-CM

## 2018-12-11 DIAGNOSIS — E11.9 CONTROLLED TYPE 2 DIABETES MELLITUS WITHOUT COMPLICATION, WITH LONG-TERM CURRENT USE OF INSULIN (H): ICD-10-CM

## 2018-12-11 NOTE — TELEPHONE ENCOUNTER
Routing refill request to provider for review/approval because:  Labs out of range:  microalbumin  Patient needs to be seen because:  Overdue for diabetic check; BP elevated    Madeleine Combs RN

## 2018-12-13 RX ORDER — SIMVASTATIN 10 MG
10 TABLET ORAL AT BEDTIME
Qty: 90 TABLET | Refills: 1 | Status: SHIPPED | OUTPATIENT
Start: 2018-12-13 | End: 2019-08-08

## 2018-12-13 RX ORDER — GLIPIZIDE 10 MG/1
TABLET, FILM COATED, EXTENDED RELEASE ORAL
Qty: 90 TABLET | Refills: 1 | Status: SHIPPED | OUTPATIENT
Start: 2018-12-13 | End: 2019-08-07

## 2018-12-17 DIAGNOSIS — N52.2 DRUG-INDUCED ERECTILE DYSFUNCTION: ICD-10-CM

## 2018-12-18 RX ORDER — SILDENAFIL CITRATE 20 MG/1
20 TABLET ORAL
Qty: 90 TABLET | Refills: 0 | Status: SHIPPED | OUTPATIENT
Start: 2018-12-18 | End: 2019-03-22

## 2018-12-18 NOTE — TELEPHONE ENCOUNTER
Prescription approved per [facility] Refill Protocol.  Christine GUZMÁN RN

## 2018-12-18 NOTE — TELEPHONE ENCOUNTER
"Requested Prescriptions   Pending Prescriptions Disp Refills     sildenafil (REVATIO) 20 MG tablet 90 tablet 1    Last Written Prescription Date:  12/21/2016 #90 x 1  Last filled - not provided  Last office visit: 04/25/2018 DAISY Jean   Future Office Visit: None    Note: Requesting a  90 day supply (??)     Sig: Take 1 tablet (20 mg) by mouth once as needed    Erectile Dysfuction Protocol Passed - 12/17/2018  6:05 PM       Passed - Absence of nitrates on medication list       Passed - Absence of Alpha Blockers on Med list       Passed - Recent (12 mo) or future (30 days) visit within the authorizing provider's specialty    Patient had office visit in the last 12 months or has a visit in the next 30 days with authorizing provider or within the authorizing provider's specialty.  See \"Patient Info\" tab in inbasket, or \"Choose Columns\" in Meds & Orders section of the refill encounter.             Passed - Patient is age 18 or older          "

## 2019-01-10 ENCOUNTER — ALLIED HEALTH/NURSE VISIT (OUTPATIENT)
Dept: EDUCATION SERVICES | Facility: CLINIC | Age: 54
End: 2019-01-10
Payer: COMMERCIAL

## 2019-01-10 DIAGNOSIS — Z79.4 CONTROLLED TYPE 2 DIABETES MELLITUS WITHOUT COMPLICATION, WITH LONG-TERM CURRENT USE OF INSULIN (H): Primary | ICD-10-CM

## 2019-01-10 DIAGNOSIS — E11.9 CONTROLLED TYPE 2 DIABETES MELLITUS WITHOUT COMPLICATION, WITH LONG-TERM CURRENT USE OF INSULIN (H): Primary | ICD-10-CM

## 2019-01-10 PROCEDURE — G0108 DIAB MANAGE TRN  PER INDIV: HCPCS

## 2019-01-10 RX ORDER — LANCETS
EACH MISCELLANEOUS
Qty: 102 EACH | Refills: 11 | Status: SHIPPED | OUTPATIENT
Start: 2019-01-10

## 2019-01-10 NOTE — PROGRESS NOTES
"Diabetes Self-Management Education & Support    Diabetes Education Self Management & Training    SUBJECTIVE/OBJECTIVE:  Presents for: Individual review  Accompanied by: Self  Diabetes education in the past 24mo: Yes  Focus of Visit: Taking Medication  Diabetes type: Type 2  Disease course: Stable  How confident are you filling out medical forms by yourself:: Extremely  Transportation concerns: No  Other concerns:: None  Cultural Influences/Ethnic Background:  Choose not to answer    Diabetes Symptoms & Complications   no signs/symptoms      Patient Problem List and Family Medical History reviewed for relevant medical history, current medical status, and diabetes risk factors.    Vitals:  Wt Readings from Last 5 Encounters:   01/11/19 86.3 kg (190 lb 4 oz)   07/12/18 86.4 kg (190 lb 8 oz)   04/25/18 86.6 kg (191 lb)   02/12/18 87.8 kg (193 lb 8 oz)   07/10/17 85.8 kg (189 lb 4 oz)     Estimated body mass index is 26.53 kg/m  as calculated from the following:    Height as of 1/11/19: 1.803 m (5' 11\").    Weight as of 1/11/19: 86.3 kg (190 lb 4 oz).   Last 3 BP:   BP Readings from Last 3 Encounters:   01/11/19 139/78   11/20/18 158/80   11/15/18 155/78       History   Smoking Status     Never Smoker   Smokeless Tobacco     Never Used       Labs:  Lab Results   Component Value Date    A1C 8.1 01/11/2019     Lab Results   Component Value Date     07/23/2018     Lab Results   Component Value Date    LDL 70 07/23/2018     HDL Cholesterol   Date Value Ref Range Status   07/23/2018 41 >39 mg/dL Final   ]  GFR Estimate   Date Value Ref Range Status   07/23/2018 >90 >60 mL/min/1.7m2 Final     Comment:     Non  GFR Calc     GFR Estimate If Black   Date Value Ref Range Status   07/23/2018 >90 >60 mL/min/1.7m2 Final     Comment:      GFR Calc     Lab Results   Component Value Date    CR 0.87 07/23/2018     No results found for: MICROALBUMIN    Healthy Eating  Healthy Eating Assessed Today: " Yes  Cultural/Sabianist diet restrictions?: No  Patient on a regular basis: (2-3 meals/day)  Meal planning: Avoiding sweets, Smaller portions  Breakfast: plain coffee  OR bowl of cereal   Lunch: often skips or hamburger  Dinner: larger meal; eats most foods before bed and is waking up higher  Steak or potatoes, yam, cassava, rice meat/starch/veggies  Beverages: Water, Alcohol  Has patient met with a dietitian in the past?: Yes    Being Active  Being Active Assessed Today: No    Monitoring  Monitoring Assessed Today: Yes  Did patient bring glucose meter to appointment? : No  Low Glucose Range (mg/dL):   High Glucose Range (mg/dL): >200  Overall Range (mg/dL): 180-200      Taking Medications  Diabetes Medication(s)     Biguanides Sig    metFORMIN (GLUCOPHAGE) 500 MG tablet TAKE 2 TABLETS (1,000 MG) BY MOUTH 2 TIMES DAILY (WITH MEALS)    Insulin Sig    BASAGLAR 100 UNIT/ML injection INJECT 24 UNITS SUBCUTANEOUS AT BEDTIME    HUMALOG KWIKPEN 100 UNIT/ML soln PER SLIDING SCALE BEFORE BREAKFAST AND DINNER, UP TO 20 UNITS PER DAY.    insulin glargine (LANTUS SOLOSTAR) 100 UNIT/ML injection Inject 24 Units Subcutaneous At Bedtime    Sulfonylureas Sig    glipiZIDE (GLUCOTROL XL) 10 MG 24 hr tablet TAKE 1 TABLET BY MOUTH ONCE DAILY      Lantus 0-0-0-22   Humalog 15-18 units at breakfast to include food & correction if over 200mg/dL   Skips Humalog at breakfast if under 200mg/dL   No Humalog at lunch or dinner.   Sometimes takes 15-18units at night if blood sugars are elevated after dinner.      Recommended at last visit, but not followed   Pre meal blood sugar Additional unit of Humalog to add to meal dose   150-185 1 units   186-220 2 units   221-255 3 units   256+ 4 units     Breakfast - 1:10 insulin to carbohydrate ratio + correction     Lunch & Dinner:   Small meal (30-45 grams carb): 2 units Humalog + correction   Medium Meal (46-60gm carb): 3 units Humalog + correction   Large Meal (61+ grams carb): 4 units  Humalog + correction       Taking Medication Assessed Today: Yes  Problems taking diabetes medications regularly?: Yes(difficulty with the mealtime insulin)  Treatment Compliance: Some of the time    Problem Solving  Problem Solving Assessed Today: Yes  Hypoglycemia Frequency: Rarely  Patient carries a carbohydrate source: (advised to do so)    Reducing Risks  Reducing Risks Assessed Today: No    Healthy Coping  Healthy Coping Assessed Today: Yes  Emotional response to diabetes: Ready to learn  Stage of change: ACTION (Actively working towards change)  Difficulty affording diabetes management supplies?: Yes(testign supplies were expensive which limited bg checks; gave new brand)  Support resources: In-person Offerings  Patient Activation Measure Survey Score:  GAEL Score (Last Two) 4/5/2013   GAEL Raw Score 45   Activation Score 73.1   GAEL Level 4     Patient's most recent   Lab Results   Component Value Date    A1C 8.1 01/11/2019    is not meeting goal of <8.0    ASSESSMENT/INTERVENTION:   Discussed benefits of increasing blood sugar checks to better evaluate insulin dosing.  Supplies have been expensive limiting testing.  Gave a new guide meter and patient can use the co-pay card if needed; this significantly reduces the cost of test strips.  Patient wants to check more and is going to try the new meter.   Patient declines continuous glucose monitor and does not want something on his arm.      Reviewed carbohydrate counting and evaluating the size of carbohydrates in a meal.  Patient will a 1:10 insulin to carbohydrate ratio at breakfast + correction.  Breakfast insulin will decrease by approximately 8-10 units, which will then be spread out to lunch and dinner.  Pre dinner blood sugars elevated when eating (eats lunch half of the time, otherwise skips).  Reviewed using a small amount of Humalog to cover lunch & dinner and adjusted scale to 15gm 1 unit, 30-45gm 2 units, 46+ 3 units.   Patient has elevated post  dinner checks and suspect that mornings are often elevated due to a lack of insulin coverage at dinner.  Reviewed risks of hypoglycemia with patient dosing 15-18 units at night after dinner as this is a larger dose (and patient thinks he goes low when giving this much) and how this would be better split up at the meals versus all at one time.   Adjusted pre meal correction to 1:50> 150 (max 3 units), patient only to correct before a meal and not at bedtime.  Reviewed humalog insulin action time.  Reviewed glucose tabs, increased monitoring, signs/symptoms and treatment of low blood sugars.     At lunch patient is often having an ensure high protein (19gm carbohydrate) or a sandwich (30gm carbohydrate).  Lunch will likely start at 1-2 units Humalog when eating.  Dinner is a larger meal, but patient is still watching portions.  Dinner will likely start at 2 units of Humalog.  Patient feels that 1-2 units will not do anything, however recommend this as a place to start and that blood sugars are already in fair control with an A1c in the 8s. Discussed how pre/post meal testing will help better evaluate the Humalog doses.  Continue Lantus at 22 units (used to be at 26).   Consider reducing / discontinuing the sulfonylurea in the future.     Gave food/insulin logs to fill out x 2 weeks to better evaluate carbohydrate intake and insulin dosing.  Patient will send these in via email/fax for review.       Diabetes knowledge and skills assessment:     Patient is knowledgeable in diabetes management concepts related to: Monitoring, Problem Solving, Reducing Risks and Healthy Coping    Patient needs further education on the following diabetes management concepts: Healthy Eating, Taking Medication, Problem Solving and Reducing Risks    Based on learning assessment above, most appropriate setting for further diabetes education would be: Individual setting.    Education provided today on:  AADE Self-Care Behaviors:  Healthy  Eating: carbohydrate counting, consistency in amount, composition, and timing of food intake, portion control and label reading  Being Active: relationship to blood glucose and precautions to take  Monitoring: individual blood glucose targets and frequency of monitoring  Taking Medication: action of prescribed medication and when to take medications  Problem Solving: high blood glucose - causes, signs/symptoms, treatment and prevention, low blood glucose - causes, signs/symptoms, treatment and prevention, carrying a carbohydrate source at all times and when to call health care provider    Opportunities for ongoing education and support in diabetes-self management were discussed.  Pt verbalized understanding of concepts discussed and recommendations provided today.       Education Materials Provided:  No new materials provided today    PLAN:  See Patient Instructions for co-developed, patient-stated behavior change goals.  AVS printed and provided to patient today. See Follow-Up section for recommended follow-up.  Patient to make follow up, fax in food/insulin records after ~2 weeks     Charisse Ramirez RD, LD, CDE  Diabetes Education    Time Spent: 60 minutes  Encounter Type: Individual    Any diabetes medication dose changes were made via the CDE Protocol and Collaborative Practice Agreement with the patient's primary care provider. A copy of this encounter was shared with the provider.

## 2019-01-10 NOTE — PATIENT INSTRUCTIONS
Pre meal blood sugar  Breakfast, lunch, dinner only Additional unit of Humalog to add to meal dose   150-200 1 units   201-250 2 units   251+ 3 units             Breakfast - take 1 unit of Humalog for every 10 grams of carbohydrate you eat.    Examples  Bagel: 60 grams carb = 6 units + correction if needed  Bowl of cereal (the granola that you eat) 70 grams carb = 7 units + correction if needed       Lunch & Dinner:   Snack/ (15 grams): 1 unit Humalog  Medium meal (30-45 grams carb): 2 units Humalog + correction if needed  Average meal (46+ carb): 3 units Humalog + correction if needed      Keep Glucose tabs in car & at work    Charisse Ramirez RD, LD, CDE  For Diabetes Education appointments call: [phone]   For Diabetes Education Related Questions call: [phone] or email: [email or web address]

## 2019-01-11 ENCOUNTER — OFFICE VISIT (OUTPATIENT)
Dept: FAMILY MEDICINE | Facility: CLINIC | Age: 54
End: 2019-01-11
Payer: COMMERCIAL

## 2019-01-11 VITALS
SYSTOLIC BLOOD PRESSURE: 139 MMHG | BODY MASS INDEX: 26.64 KG/M2 | DIASTOLIC BLOOD PRESSURE: 78 MMHG | HEART RATE: 80 BPM | WEIGHT: 190.25 LBS | HEIGHT: 71 IN

## 2019-01-11 DIAGNOSIS — Z00.00 ROUTINE GENERAL MEDICAL EXAMINATION AT A HEALTH CARE FACILITY: Primary | ICD-10-CM

## 2019-01-11 DIAGNOSIS — E11.9 CONTROLLED TYPE 2 DIABETES MELLITUS WITHOUT COMPLICATION (H): ICD-10-CM

## 2019-01-11 DIAGNOSIS — E11.9 CONTROLLED TYPE 2 DIABETES MELLITUS WITHOUT COMPLICATION, WITH LONG-TERM CURRENT USE OF INSULIN (H): ICD-10-CM

## 2019-01-11 DIAGNOSIS — Z12.5 SCREENING FOR PROSTATE CANCER: ICD-10-CM

## 2019-01-11 DIAGNOSIS — Z79.4 CONTROLLED TYPE 2 DIABETES MELLITUS WITHOUT COMPLICATION, WITH LONG-TERM CURRENT USE OF INSULIN (H): ICD-10-CM

## 2019-01-11 LAB
HBA1C MFR BLD: 8.1 % (ref 0–5.6)
PSA SERPL-ACNC: 0.18 UG/L (ref 0–4)

## 2019-01-11 PROCEDURE — 36415 COLL VENOUS BLD VENIPUNCTURE: CPT | Performed by: FAMILY MEDICINE

## 2019-01-11 PROCEDURE — 99396 PREV VISIT EST AGE 40-64: CPT | Performed by: FAMILY MEDICINE

## 2019-01-11 PROCEDURE — 99213 OFFICE O/P EST LOW 20 MIN: CPT | Mod: 25 | Performed by: FAMILY MEDICINE

## 2019-01-11 PROCEDURE — 83036 HEMOGLOBIN GLYCOSYLATED A1C: CPT | Performed by: FAMILY MEDICINE

## 2019-01-11 PROCEDURE — G0103 PSA SCREENING: HCPCS | Performed by: FAMILY MEDICINE

## 2019-01-11 ASSESSMENT — ANXIETY QUESTIONNAIRES
6. BECOMING EASILY ANNOYED OR IRRITABLE: NOT AT ALL
5. BEING SO RESTLESS THAT IT IS HARD TO SIT STILL: NOT AT ALL
3. WORRYING TOO MUCH ABOUT DIFFERENT THINGS: NOT AT ALL
GAD7 TOTAL SCORE: 3
2. NOT BEING ABLE TO STOP OR CONTROL WORRYING: NOT AT ALL
7. FEELING AFRAID AS IF SOMETHING AWFUL MIGHT HAPPEN: SEVERAL DAYS
1. FEELING NERVOUS, ANXIOUS, OR ON EDGE: SEVERAL DAYS

## 2019-01-11 ASSESSMENT — PATIENT HEALTH QUESTIONNAIRE - PHQ9
SUM OF ALL RESPONSES TO PHQ QUESTIONS 1-9: 7
5. POOR APPETITE OR OVEREATING: SEVERAL DAYS

## 2019-01-11 ASSESSMENT — MIFFLIN-ST. JEOR: SCORE: 1725.1

## 2019-01-11 NOTE — PATIENT INSTRUCTIONS
Preventive Health Recommendations  Male Ages 50 - 64    *    Could be the alcohol for both mood and blood sugars.     *   Check on [employer] for testing strips.     *   For mood, can try Wellbutrin. Give this one month to help. They are other medications we can try.     *   The A1c, a 3 month average of your blood sugars, 8.1.     *    The chest pain is probably anxiety.     *    Will need to check cholesterol in July.       Yearly exam:             See your health care provider every year in order to  o   Review health changes.   o   Discuss preventive care.    o   Review your medicines if your doctor has prescribed any.     Have a cholesterol test every 5 years, or more frequently if you are at risk for high cholesterol/heart disease.     Have a diabetes test (fasting glucose) every three years. If you are at risk for diabetes, you should have this test more often.     Have a colonoscopy at age 50, or have a yearly FIT test (stool test). These exams will check for colon cancer.      Talk with your health care provider about whether or not a prostate cancer screening test (PSA) is right for you.    You should be tested each year for STDs (sexually transmitted diseases), if you re at risk.     Shots: Get a flu shot each year. Get a tetanus shot every 10 years.     Nutrition:    Eat at least 5 servings of fruits and vegetables daily.     Eat whole-grain bread, whole-wheat pasta and brown rice instead of white grains and rice.     Get adequate Calcium and Vitamin D.     Lifestyle    Exercise for at least 150 minutes a week (30 minutes a day, 5 days a week). This will help you control your weight and prevent disease.     Limit alcohol to one drink per day.     No smoking.     Wear sunscreen to prevent skin cancer.     See your dentist every six months for an exam and cleaning.     See your eye doctor every 1 to 2 years.

## 2019-01-11 NOTE — TELEPHONE ENCOUNTER
"Requested Prescriptions   Pending Prescriptions Disp Refills     metFORMIN (GLUCOPHAGE) 500 MG tablet [Pharmacy Med Name: METFORMIN  MG TABLET] 360 tablet 1    Last Written Prescription Date:  07/11/2018 #360 x 1  Last filled 10/18/2018  Last office visit: 01/11/2019 DAISY Jean   Future Office Visit: None    Sig: TAKE 2 TABLETS BY MOUTH TWICE DAILY WITH MEALS-DUE TO BE SEEN FOR FURTHER REFILLS    Biguanide Agents Failed - 1/11/2019  9:40 AM       Failed - Blood pressure less than 140/90 in past 6 months    BP Readings from Last 3 Encounters:   01/11/19 139/78   11/20/18 158/80   11/15/18 155/78                Passed - Patient has documented LDL within the past 12 mos.    Recent Labs   Lab Test 07/23/18  1521   LDL 70            Passed - Patient has had a Microalbumin in the past 15 mos.    Recent Labs   Lab Test 07/23/18  1522  04/20/15   MICROALB  --   --  <5.0   MICROL 52   < >  --    UMALCR 26.46*   < > --    < > = values in this interval not displayed.            Passed - Patient is age 10 or older       Passed - Patient has documented A1c within the specified period of time.    If HgbA1C is 8 or greater, it needs to be on file within the past 3 months.  If less than 8, must be on file within the past 6 months.     Recent Labs   Lab Test 01/11/19  1056   A1C 8.1*            Passed - Patient's CR is NOT>1.4 OR Patient's EGFR is NOT<45 within past 12 mos.    Recent Labs   Lab Test 07/23/18  1521   GFRESTIMATED >90   GFRESTBLACK >90       Recent Labs   Lab Test 07/23/18  1521   CR 0.87            Passed - Patient does NOT have a diagnosis of CHF.       Passed - Medication is active on med list       Passed - Recent (6 mo) or future (30 days) visit within the authorizing provider's specialty    Patient had office visit in the last 6 months or has a visit in the next 30 days with authorizing provider or within the authorizing provider's specialty.  See \"Patient Info\" tab in inbasket, or \"Choose Columns\" in " Meds & Orders section of the refill encounter.

## 2019-01-11 NOTE — PROGRESS NOTES
"  SUBJECTIVE:   CC: Stewart Turcios is an 53 year old male who presents for preventive health visit.     Healthy Habits:    Do you get at least three servings of calcium containing foods daily (dairy, green leafy vegetables, etc.)? yes    Amount of exercise or daily activities, outside of work: 2 day(s) per week    Problems taking medications regularly No    Medication side effects: No    Have you had an eye exam in the past two years? yes    Do you see a dentist twice per year? yes    Do you have sleep apnea, excessive snoring or daytime drowsiness?no    Anxious/Stress  Patient complains of stress. He does note financial stress with his [employer]to business and stress with age/jail. He states that he has \"highs and lows\" with his mood that he would like to control more. He is not on any current medications for anxiety.  Notes feels down sometimes.  Has a great support system. He states his wife has made comments about his drinking. Will drink a bottle of wine. Notes increased ETOH drinking.  Notes difficulty concentration and getting things done.  Notes will feel left sided chest pain when anxious and stressed.  When up and about doesn't feel it.  Feels chest pain more when lying down, unable to shut mind off.    Diabetes Follow-up  Insulin - Basaglar and humalog, glipizide 10 mg every day, metformin 1,000 mg bid  Patient is checking blood sugars: 3-6 times daily.    Blood sugar testing frequency justification: Uncontrolled diabetes  Results are as follows:         115-200    Diabetic concerns: None     Symptoms of hypoglycemia (low blood sugar): none     Paresthesias (numbness or burning in feet) or sores: No     Date of last diabetic eye exam: 12/2018    Diabetes Management Resources    Hyperlipidemia Follow-Up  Simvastatin 10 mg every day     Rate your low fat/cholesterol diet?: not monitoring fat    Taking statin?  Yes, no muscle aches from statin    Other lipid medications/supplements?:  " none    Hypertension Follow-up  Losartan 50 mg every day     Outpatient blood pressures are not being checked.    Low Salt Diet: not monitoring salt    Erectile Dysfunction  Patient continues to have difficulty maintaining an erection.  Requests refill.  Denies any side effects.    BP Readings from Last 2 Encounters:   01/11/19 139/78   11/20/18 158/80     Hemoglobin A1C (%)   Date Value   01/11/2019 8.1 (H)   11/07/2018 8.3 (H)     LDL Cholesterol Calculated (mg/dL)   Date Value   07/23/2018 70   05/12/2016 84     LDL Cholesterol Direct (mg/dL)   Date Value   07/10/2017 76     Health Maintenance  Normal colonoscopy 8/27/2008. No family history of colorectal cancer in first-degree relative. On q 10 year colonoscopy schedule and is due to repeat.    Today's PHQ-2 Score:   PHQ-2 ( 1999 [employer]) 7/12/2018 3/10/2017   Q1: Little interest or pleasure in doing things 0 0   Q2: Feeling down, depressed or hopeless 0 0   PHQ-2 Score 0 0       Abuse: Current or Past(Physical, Sexual or Emotional)- No  Do you feel safe in your environment? Yes    Social History     Tobacco Use     Smoking status: Never Smoker     Smokeless tobacco: Never Used   Substance Use Topics     Alcohol use: Yes     Alcohol/week: 0.0 oz     Comment: occasional     If you drink alcohol do you typically have >3 drinks per day or >7 drinks per week? No                      Last PSA:   PSA   Date Value Ref Range Status   03/12/2010 0.27 0 - 4 ug/L Final       Reviewed orders with patient. Reviewed health maintenance and updated orders accordingly - Yes  Labs reviewed in EPIC    Reviewed and updated as needed this visit by clinical staff  Tobacco  Allergies  Meds  Med Hx  Surg Hx  Fam Hx  Soc Hx        Reviewed and updated as needed this visit by Provider          ROS:  CONSTITUTIONAL: NEGATIVE for fever, chills, change in weight  INTEGUMENTARY/SKIN: NEGATIVE for worrisome rashes, moles or lesions  EYES: NEGATIVE for vision changes or irritation  ENT:  "NEGATIVE for ear, mouth and throat problems  RESP: NEGATIVE for significant cough or SOB  CV: NEGATIVE for chest pain, palpitations or peripheral edema  GI: NEGATIVE for nausea, abdominal pain, heartburn, or change in bowel habits   male: POSITIVE for erectile dysfunction  MUSCULOSKELETAL: NEGATIVE for significant arthralgias or myalgia  NEURO: NEGATIVE for weakness, dizziness or paresthesias  PSYCHIATRIC: POSITIVE for anxiety, concentration difficulty and stress    This document serves as a record of the services and decisions personally performed and made by Anahi Jean MD. It was created on his behalf by Aaron Wong, a trained medical scribe. The creation of this document is based the provider's statements to the medical scribe.  Aaron Wong 11:26 AM January 11, 2019    OBJECTIVE:   /78   Pulse 80   Ht 1.803 m (5' 11\")   Wt 86.3 kg (190 lb 4 oz)   BMI 26.53 kg/m    EXAM:  GENERAL: healthy, alert and in mild distress  EYES: Eyes grossly normal to inspection, conjunctivae and sclerae normal  HENT: ear canals and TM's normal, nose and mouth without ulcers or lesions  NECK: no adenopathy, no asymmetry, masses, or scars and thyroid normal to palpation  RESP: lungs clear to auscultation - no rales, rhonchi or wheezes  CV: regular rate and rhythm, normal S1 S2  ABDOMEN: soft, nontender  MS: no gross musculoskeletal defects noted, no edema  SKIN: no suspicious lesions or rashes  NEURO: Normal strength and tone, mentation intact and speech normal  PSYCH: mentation appears normal, affect normal/bright    Results for orders placed or performed in visit on 01/11/19   Hemoglobin A1c   Result Value Ref Range    Hemoglobin A1C 8.1 (H) 0 - 5.6 %   PSA, screen   Result Value Ref Range    PSA 0.18 0 - 4 ug/L      ASSESSMENT/PLAN:   (Z00.00) Routine general medical examination at a health care facility  (primary encounter diagnosis)  Comment: Overall, no significant change.  Plan:       (E11.9,  Z79.4) Controlled " "type 2 diabetes mellitus without complication, with long-term current use of insulin (H)  Comment: Still above goal.  Plan: Hemoglobin A1c        *Advise follow-up with his endocrinologist.    (Z12.5) Screening for prostate cancer  Comment: Very low PSA, reassuring there be no signs of prostate cancer  Plan: PSA, screen    Patient Instructions     *    Could be the alcohol for both mood and blood sugars.     *   Check on [employer] for testing strips.     *   For mood, can try Wellbutrin. Give this one month to help. They are other medications we can try.     *   The A1c, a 3 month average of your blood sugars, 8.1.     *    The chest pain is probably anxiety.     *    Will need to check cholesterol in July.     COUNSELING:  Reviewed preventive health counseling, as reflected in patient instructions    BP Readings from Last 1 Encounters:   01/11/19 139/78     Estimated body mass index is 26.53 kg/m  as calculated from the following:    Height as of this encounter: 1.803 m (5' 11\").    Weight as of this encounter: 86.3 kg (190 lb 4 oz).       reports that  has never smoked. he has never used smokeless tobacco.      Counseling Resources:  ATP IV Guidelines  Pooled Cohorts Equation Calculator  FRAX Risk Assessment  ICSI Preventive Guidelines  Dietary Guidelines for Americans, 2010  USDA's MyPlate  ASA Prophylaxis  Lung CA Screening    The information in this document, created by a scribe for me, accurately reflects the services I personally performed and the decisions made by me. I have reviewed and approved this document for accuracy.     Anahi Jean MD  [facility]  "

## 2019-01-11 NOTE — LETTER
January 14, 2019      Stewart Turcios  [address]  [place] [address]            Stewart,    Fortunately, the PSA, the blood test for prostate cancer, was low.  Please see enclosed copy.  This is reassuring that you do not have prostate cancer.  We should recheck your PSA yearly.  Also, as we talked about, your A1c is slowly improving but still above the recommended guidelines    Resulted Orders   Hemoglobin A1c   Result Value Ref Range    Hemoglobin A1C 8.1 (H) 0 - 5.6 %      Comment:      Normal <5.7% Prediabetes 5.7-6.4%  Diabetes 6.5% or higher - adopted from ADA   consensus guidelines.     PSA, screen   Result Value Ref Range    PSA 0.18 0 - 4 ug/L      Comment:      Assay Method:  Chemiluminescence using Siemens Vista analyzer       If you have any questions or concerns, please call the clinic at the number listed above.       Sincerely,        Anahi Jean MD/ag

## 2019-01-12 ENCOUNTER — NURSE TRIAGE (OUTPATIENT)
Dept: NURSING | Facility: CLINIC | Age: 54
End: 2019-01-12

## 2019-01-12 ASSESSMENT — ANXIETY QUESTIONNAIRES: GAD7 TOTAL SCORE: 3

## 2019-01-12 NOTE — TELEPHONE ENCOUNTER
Pharmacy can't fill some of the prescriptions so sent a request to the clinic for what is needed to fill the prescriptions. I advised no one is at the clinic to follow through with the pharmacy's request, that the pharmacy should contact the clinic again on Monday, to make sure they follow through with what is needed to fill the prescriptions.  He will do that.  Aster Wei RN-[facility]

## 2019-01-13 RX ORDER — BUPROPION HYDROCHLORIDE 150 MG/1
TABLET ORAL
Qty: 60 TABLET | Refills: 1 | Status: SHIPPED | OUTPATIENT
Start: 2019-01-13 | End: 2019-02-08

## 2019-01-14 NOTE — TELEPHONE ENCOUNTER
Prescription approved per [facility] Refill Protocol or patient Primary care provider (PCP)  TARA Diaz RN/Malcolm Ledesma

## 2019-02-07 DIAGNOSIS — Z79.4 CONTROLLED TYPE 2 DIABETES MELLITUS WITHOUT COMPLICATION, WITH LONG-TERM CURRENT USE OF INSULIN (H): ICD-10-CM

## 2019-02-07 DIAGNOSIS — E11.9 CONTROLLED TYPE 2 DIABETES MELLITUS WITHOUT COMPLICATION, WITH LONG-TERM CURRENT USE OF INSULIN (H): ICD-10-CM

## 2019-02-07 RX ORDER — BUPROPION HYDROCHLORIDE 150 MG/1
TABLET ORAL
Qty: 60 TABLET | Refills: 1 | Status: CANCELLED | OUTPATIENT
Start: 2019-02-07

## 2019-02-07 NOTE — TELEPHONE ENCOUNTER
"Requested Prescriptions   Pending Prescriptions Disp Refills     buPROPion (WELLBUTRIN XL) 150 MG 24 hr tablet 60 tablet 1    Last Written Prescription Date:  01/13/2019 #60 x 1  Last filled - not provided  Last office visit: 1/11/2019 DAISY Jean   Future Office Visit: None     Note: Requesting a 90 day supply   Sig: Take 1 tablet each morning for one week, then 2 tablets each morning.    SSRIs Protocol Passed - 2/7/2019  4:14 PM  PHQ-9 SCORE 1/11/2019   PHQ-9 Total Score 7       ARTUR-7 SCORE 1/11/2019   Total Score 3              Passed - Recent (12 mo) or future (30 days) visit within the authorizing provider's specialty    Patient had office visit in the last 12 months or has a visit in the next 30 days with authorizing provider or within the authorizing provider's specialty.  See \"Patient Info\" tab in inbasket, or \"Choose Columns\" in Meds & Orders section of the refill encounter.             Passed - Medication is Bupropion    If the medication is Bupropion (Wellbutrin), and the patient is taking for smoking cessation; OK to refill.         Passed - Medication is active on med list       Passed - Patient is age 18 or older          "

## 2019-02-11 ENCOUNTER — OFFICE VISIT (OUTPATIENT)
Dept: FAMILY MEDICINE | Facility: CLINIC | Age: 54
End: 2019-02-11
Payer: COMMERCIAL

## 2019-02-11 ENCOUNTER — TELEPHONE (OUTPATIENT)
Dept: FAMILY MEDICINE | Facility: CLINIC | Age: 54
End: 2019-02-11

## 2019-02-11 VITALS
DIASTOLIC BLOOD PRESSURE: 74 MMHG | WEIGHT: 198.4 LBS | RESPIRATION RATE: 16 BRPM | HEART RATE: 81 BPM | SYSTOLIC BLOOD PRESSURE: 126 MMHG | OXYGEN SATURATION: 98 % | TEMPERATURE: 98.7 F | BODY MASS INDEX: 27.67 KG/M2

## 2019-02-11 DIAGNOSIS — F41.1 GAD (GENERALIZED ANXIETY DISORDER): Primary | ICD-10-CM

## 2019-02-11 DIAGNOSIS — R00.2 HEART PALPITATIONS: ICD-10-CM

## 2019-02-11 PROCEDURE — 99214 OFFICE O/P EST MOD 30 MIN: CPT | Performed by: NURSE PRACTITIONER

## 2019-02-11 PROCEDURE — 93000 ELECTROCARDIOGRAM COMPLETE: CPT | Performed by: NURSE PRACTITIONER

## 2019-02-11 ASSESSMENT — ANXIETY QUESTIONNAIRES
5. BEING SO RESTLESS THAT IT IS HARD TO SIT STILL: NOT AT ALL
6. BECOMING EASILY ANNOYED OR IRRITABLE: NOT AT ALL
2. NOT BEING ABLE TO STOP OR CONTROL WORRYING: NOT AT ALL
1. FEELING NERVOUS, ANXIOUS, OR ON EDGE: SEVERAL DAYS
GAD7 TOTAL SCORE: 3
7. FEELING AFRAID AS IF SOMETHING AWFUL MIGHT HAPPEN: NOT AT ALL
3. WORRYING TOO MUCH ABOUT DIFFERENT THINGS: SEVERAL DAYS

## 2019-02-11 ASSESSMENT — PATIENT HEALTH QUESTIONNAIRE - PHQ9
SUM OF ALL RESPONSES TO PHQ QUESTIONS 1-9: 4
5. POOR APPETITE OR OVEREATING: SEVERAL DAYS

## 2019-02-11 ASSESSMENT — PAIN SCALES - GENERAL: PAINLEVEL: NO PAIN (0)

## 2019-02-11 NOTE — PATIENT INSTRUCTIONS
Work on stress reduction     You need to start to write things  Down in a planner     Start to delegate!!!!!      Off load the things that YOU don't need to do     Congratulations on doing so well.       And NO more full bottle of wine at night        Stay well hydrated - urine should be clear.      You can take your baby aspirin at bedtime

## 2019-02-11 NOTE — TELEPHONE ENCOUNTER
1/11/2019  Notes from last OV   Notes will feel left sided chest pain when anxious and stressed.  When up and about doesn't feel it.  Feels chest pain more when lying down, unable to shut mind off  Lab Results   Component Value Date    A1C 8.1 01/11/2019

## 2019-02-11 NOTE — TELEPHONE ENCOUNTER
Reason for call:  Patient reporting a symptom    Symptom or request: Tingling on left hand side for a couple of days. Mostly at night. Around 4 to 5 am, right below breast on left side.  He wants EKG ordered.    Duration (how long have symptoms been present): Couple of days    Have you been treated for this before?     Additional comments: any    Phone Number patient can be reached at:  Home number on file [phone] (home)    Best Time:  any    Can we leave a detailed message on this number:  NO    Call taken on 2/11/2019 at 9:41 AM by Negar Almeida

## 2019-02-11 NOTE — NURSING NOTE
"Initial /74 (BP Location: Left arm, Patient Position: Chair, Cuff Size: Adult Large)   Pulse 81   Temp 98.7  F (37.1  C) (Tympanic)   Resp 16   Wt 90 kg (198 lb 6.4 oz)   SpO2 98%   BMI 27.67 kg/m   Estimated body mass index is 27.67 kg/m  as calculated from the following:    Height as of 1/11/19: 1.803 m (5' 11\").    Weight as of this encounter: 90 kg (198 lb 6.4 oz). .    Jerica David CMA ([facility])    "

## 2019-02-11 NOTE — TELEPHONE ENCOUNTER
Warm hand off given.  Patient requesting EKG today.    Patient reports left chest/breast tingling, cramp at NIGHT only, intermittent x4 days.  Non radiating.  Denies back pain.  Denies shortness of breath.  Patient is exercising regularly, denies change in routine.  Appointment made for 1120 today for evaluation.  Patient agrees with plan.  Chayito Peralta RN

## 2019-02-11 NOTE — PROGRESS NOTES
SUBJECTIVE:   Stewart Turcios is a 53 year old male who presents to clinic today for the following health issues:    CHEST PAIN     Onset: Ongoing, intermittent issue    Description:   Location:  left side  Character: cramping, tingling  Radiation: None  Duration: will notice it mostly at night when laying down, will not last very long once he gets up and starts moving around     Intensity: moderate    Progression of Symptoms:  worsening    Accompanying Signs & Symptoms:  Shortness of breath: no  Sweating: no  Nausea/vomiting: no  Lightheadedness: no  Palpitations: no  Fever/Chills: no  Cough: no  Heartburn: no    History:   Family history of heart disease no  Tobacco use: no    Precipitating factors:   Worse with exertion: no  Worse with deep breaths :  no  Related to food: no    Alleviating factors:  Getting up and moving around       Therapies Tried and outcome: None           Problem list and histories reviewed & adjusted, as indicated.  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 No pertinent surgical history.    Social History     Tobacco Use     Smoking status: Never Smoker     Smokeless tobacco: Never Used   Substance Use Topics     Alcohol use: Yes     Alcohol/week: 0.0 oz     Comment: occasional     Family History   Problem Relation Age of Onset     Hypertension Father      Diabetes Sister      Diabetes Brother      Cancer No family hx of      Cerebrovascular Disease No family hx of      Thyroid Disease No family hx of      Glaucoma No family hx of      Macular Degeneration No family hx of           Current Outpatient Medications   Medication Sig Dispense Refill     ACCU-CHEK SMARTVIEW test strip USE TO TEST BLOOD SUGAR TWICE DAILY OR AS DIRECTED 200 strip 3     ASPIRIN 81 MG OR TABS 1 tab po QD (Once per day) 100 3     BASAGLAR 100 UNIT/ML injection INJECT 24 UNITS SUBCUTANEOUS AT BEDTIME 45 mL 1     blood glucose (ACCU-CHEK GUIDE) test strip Use to test blood sugar 5 times daily or as directed. 400 strip 11     blood glucose (ACCU-CHEK SOFTCLIX) lancing device Device to be used with lancets. 1 each 0     blood glucose monitoring (ACCU-CHEK ANDER PLUS) meter device kit Use to test blood sugars four times daily or as directed. 1 kit 0     blood glucose monitoring (ACCU-CHEK ANDER) test strip Use to test blood sugars 4 times daily or as directed. 300 each 11     blood glucose monitoring (ACCU-CHEK FASTCLIX) lancets Use to test blood sugar 5 times daily or as directed.  Ok to substitute alternative if insurance prefers. 102 each 11     blood glucose monitoring (ACCU-CHEK SMARTVIEW) test strip Use to test blood sugar two times daily or as directed. 50 each 4     blood glucose monitoring (NO BRAND SPECIFIED) test strip Use to test blood sugars 4 times daily or as directed 300 strip 3     blood glucose monitoring (SOFTCLIX) lancets Use to test blood sugar four times daily or as directed. 1 Box 3     Cholecalciferol (VITAMIN D3 PO) Take 2,000 Units by mouth daily       glipiZIDE (GLUCOTROL XL) 10 MG 24 hr tablet TAKE 1 TABLET BY MOUTH ONCE DAILY 90 tablet 1     HUMALOG KWIKPEN 100 UNIT/ML soln PER SLIDING SCALE BEFORE BREAKFAST AND DINNER, UP TO 20 UNITS PER DAY. 45 mL 2     insulin pen needle (B-D U/F) 31G X 8 MM As directed. 100 each 11     insulin pen needle (BD MUSA U/F) 32G X 4 MM miscellaneous Use 4 pen needles daily 360 each 11     losartan (COZAAR) 50 MG tablet TAKE 1 TABLET (50 MG) BY MOUTH DAILY 90 tablet 3     sildenafil (REVATIO) 20 MG tablet Take 1 tablet (20 mg) by mouth once as needed 90 tablet 0      simvastatin (ZOCOR) 10 MG tablet Take 1 tablet (10 mg) by mouth At Bedtime For cholesterol. 90 tablet 1     atovaquone-proguanil (MALARONE) 250-100 MG per tablet Take 1 tablet by mouth daily Start 2 days before exposure to Malaria and continue daily till  7 days after exposure. (Patient not taking: Reported on 2/11/2019) 25 tablet 0     insulin glargine (LANTUS SOLOSTAR) 100 UNIT/ML injection Inject 24 Units Subcutaneous At Bedtime (Patient not taking: Reported on 2/11/2019) 6 mL 3     metFORMIN (GLUCOPHAGE) 500 MG tablet Take 2 tablets (1,000 mg) by mouth 2 times daily (with meals) (Patient not taking: Reported on 2/11/2019) 360 tablet 3     Multiple Vitamins-Minerals (MULTIVITAMIN OR)        Allergies   Allergen Reactions     No Known Allergies      BP Readings from Last 3 Encounters:   02/11/19 126/74   01/11/19 139/78   11/20/18 158/80    Wt Readings from Last 3 Encounters:   02/11/19 90 kg (198 lb 6.4 oz)   01/11/19 86.3 kg (190 lb 4 oz)   07/12/18 86.4 kg (190 lb 8 oz)                    Reviewed and updated as needed this visit by clinical staff       Reviewed and updated as needed this visit by Provider         ROS:  CONSTITUTIONAL:NEGATIVE for fever, chills, change in weight  RESP:NEGATIVE for significant cough or SOB  CV: NEGATIVE for chest pain, palpitations or peripheral edema, POSITIVE for cramping/muscle spasm on L side right below chest, see above for specifics; pt has had these symptoms before years ago and had stress tests, ECHOs, and EKGs done with no significant findings that he can remember  MUSCULOSKELETAL: NEGATIVE for significant arthralgias or myalgia  PSYCHIATRIC: NEGATIVE for changes in mood or affect and POSITIVE for stress related to weather, finances, and many things on his plate; POSITIVE for increased alcohol intake, drinking up to a bottle of wine a night, does not see it as a problem to reduce that amount and verbalizes that he knows the importance of doing so for his diabetes  and depression medication    OBJECTIVE:     /74 (BP Location: Left arm, Patient Position: Chair, Cuff Size: Adult Large)   Pulse 81   Temp 98.7  F (37.1  C) (Tympanic)   Resp 16   Wt 90 kg (198 lb 6.4 oz)   SpO2 98%   BMI 27.67 kg/m    Body mass index is 27.67 kg/m .   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  Is using the wine to help relax - does agree that is to much wine on a nightly basis  Doesn't have a planner or things written down .  Does feel that having that will help  Discussed delegating people to help with the upcoming convention .  - did agree with that     Diagnostic Test Results:  EKG - unremarkable, unchanged from EKG done 04/18- appears normal, NSR, T wave abnormalities    ASSESSMENT/PLAN:     ASSESSMENT/PLAN:      ICD-10-CM    1. ARTUR (generalized anxiety disorder) F41.1    2. Heart palpitations R00.2 EKG 12-lead complete w/read - Clinics       Patient Instructions   Work on stress reduction     You need to start to write things  Down in a planner     Start to delegate!!!!!      Off load the things that YOU don't need to do     Congratulations on doing so well.       And NO more full bottle of wine at night        Stay well hydrated - urine should be clear.      You can take your baby aspirin at bedtime                     See Patient Instructions  Patient Instructions   Work on stress reduction     You need to start to write things  Down in a planner     Start to delegate!!!!!      Off load the things that YOU don't need to do     Congratulations on doing so well.       And NO more full bottle of wine at night         Stay well hydrated - urine should be clear.      You can take your baby aspirin at bedtime        Note written by Ebony Victoria NP student under supervision of Lauren Maldonado NP.    LAUREN MALDONADO NP, APRN [facility]

## 2019-02-12 ASSESSMENT — ANXIETY QUESTIONNAIRES: GAD7 TOTAL SCORE: 3

## 2019-02-20 DIAGNOSIS — Z79.4 CONTROLLED TYPE 2 DIABETES MELLITUS WITHOUT COMPLICATION, WITH LONG-TERM CURRENT USE OF INSULIN (H): ICD-10-CM

## 2019-02-20 DIAGNOSIS — E11.9 CONTROLLED TYPE 2 DIABETES MELLITUS WITHOUT COMPLICATION, WITH LONG-TERM CURRENT USE OF INSULIN (H): ICD-10-CM

## 2019-02-20 RX ORDER — BUPROPION HYDROCHLORIDE 300 MG/1
TABLET ORAL
Qty: 90 TABLET | Refills: 1 | Status: SHIPPED | OUTPATIENT
Start: 2019-02-20 | End: 2019-08-07

## 2019-02-20 NOTE — TELEPHONE ENCOUNTER
Called pt he feels it is helping some, no great change, advised will take time  He asked about wine, sometimes drinks whole bottle, discussed to limit to 2 glasses per night to see if he feels better-- no thoughts of down or hurting himself or others   Pt agreed  Advised will pend new order for [place] as 300mg tab so only will need to take on tab ,pt ok with   RX pended   T[facility]  Clinic  RN/Malcolm Ledesma

## 2019-03-20 DIAGNOSIS — N52.2 DRUG-INDUCED ERECTILE DYSFUNCTION: ICD-10-CM

## 2019-03-21 NOTE — TELEPHONE ENCOUNTER
"Requested Prescriptions   Pending Prescriptions Disp Refills     sildenafil (REVATIO) 20 MG tablet 90 tablet 0    Last Written Prescription Date:  12/18/2018 #90 x 0  Last filled - not provided  Last office visit: 2/11/2019 DAISY Maldonado   Future Office Visit: None     Note: Requesting a 90 day supply   Sig: Take 1 tablet (20 mg) by mouth once as needed    Erectile Dysfuction Protocol Passed - 3/20/2019 11:27 AM       Passed - Absence of nitrates on medication list       Passed - Absence of Alpha Blockers on Med list       Passed - Recent (12 mo) or future (30 days) visit within the authorizing provider's specialty    Patient had office visit in the last 12 months or has a visit in the next 30 days with authorizing provider or within the authorizing provider's specialty.  See \"Patient Info\" tab in inbasket, or \"Choose Columns\" in Meds & Orders section of the refill encounter.             Passed - Medication is active on med list       Passed - Patient is age 18 or older        "

## 2019-03-22 RX ORDER — SILDENAFIL CITRATE 20 MG/1
20 TABLET ORAL
Qty: 90 TABLET | Refills: 0 | Status: SHIPPED | OUTPATIENT
Start: 2019-03-22 | End: 2019-05-11

## 2019-05-01 ENCOUNTER — TELEPHONE (OUTPATIENT)
Dept: FAMILY MEDICINE | Facility: CLINIC | Age: 54
End: 2019-05-01

## 2019-05-01 NOTE — TELEPHONE ENCOUNTER
Panel Management Review      Patient has the following on his problem list:     Diabetes    ASA: Passed    Last A1C  Lab Results   Component Value Date    A1C 8.1 01/11/2019    A1C 8.3 11/07/2018    A1C 8.9 07/23/2018    A1C 8.5 04/25/2018    A1C 9.0 02/12/2018     A1C tested: FAILED    Last LDL:    Lab Results   Component Value Date    CHOL 160 07/23/2018     Lab Results   Component Value Date    HDL 41 07/23/2018     Lab Results   Component Value Date    LDL 70 07/23/2018     Lab Results   Component Value Date    TRIG 243 07/23/2018     Lab Results   Component Value Date    CHOLHDLRATIO 4.1 05/22/2015     Lab Results   Component Value Date    NHDL 119 07/23/2018       Is the patient on a Statin? YES             Is the patient on Aspirin? YES    Medications     HMG CoA Reductase Inhibitors     simvastatin (ZOCOR) 10 MG tablet       Salicylates     ASPIRIN 81 MG OR TABS             Last three blood pressure readings:  BP Readings from Last 3 Encounters:   02/11/19 126/74   01/11/19 139/78   11/20/18 158/80       Date of last diabetes office visit: 1/11/2019     Tobacco History:     History   Smoking Status     Never Smoker   Smokeless Tobacco     Never Used           Composite cancer screening  Chart review shows that this patient is due/due soon for the following Colonoscopy  Summary:    Patient is due/failing the following:   A1C and COLONOSCOPY    Action needed:   Need to complete colonoscopy. Will address need for A1c in July when patient is advised to have next office visit.    Type of outreach:    Sent letter.    Questions for provider review:    None                                                                                                                                    Padmini Ware CMA

## 2019-05-01 NOTE — LETTER
May 1, 2019      Stewart Turcios  [address]  [place] [address]        Dear Stewart,     As part of [facility]'s commitment to health and wellness we have reviewed your chart and it indicates that you are due for one or more of the following:    -- Colon screen.     Please call one of the following numbers to schedule a colonoscopy:  [facility] [phone]  [facility] [phone]  [facility] [phone]  [facility] [phone] (multiple sites, call for locations)    OR....    If you prefer to do a screening that is LESS INVASIVE AND LESS EXPENSIVE there is an test for you! It is called the FIT test. It is a screening test that is done yearly and can be DONE AT HOME! Do the test at home and mail it in (you don't even have to pay for postage). If you are willing to do this test, we can order the kit for you to  at our clinic. Please call us at [phone] if you need an order for a colonoscopy or FIT testing.    Please try to schedule and/or complete the tests above within the next 2-4 weeks.   The number to call to schedule an appointment at [facility] is [phone].    While we work hard to maintain accurate records, it is always possible that this notice does not accurately reflect tests that you may have had. To ensure that we do not send you unnecessary notices please verify that we have accurate dates of your tests (even if these were done many years ago) or if you are seeking care at another clinic.      Sincerely,     Anahi Jean MD

## 2019-06-09 DIAGNOSIS — E11.9 CONTROLLED TYPE 2 DIABETES MELLITUS WITHOUT COMPLICATION, WITH LONG-TERM CURRENT USE OF INSULIN (H): ICD-10-CM

## 2019-06-09 DIAGNOSIS — Z79.4 CONTROLLED TYPE 2 DIABETES MELLITUS WITHOUT COMPLICATION, WITH LONG-TERM CURRENT USE OF INSULIN (H): ICD-10-CM

## 2019-06-09 NOTE — LETTER
[facility]  [address] [place] [id number]  [phone]        Paula 10, 2019  Stewart Turcios  [address]  [place] [address]    Dear Stewart,    I care about your health and have reviewed your health plan. I have reviewed your medical conditions, medication list, and lab results and am making recommendations based on this review, to better manage your health.    You are in particular need of attention regarding:  -Diabetes    I am recommending that you:  -schedule a FOLLOWUP OFFICE APPOINTMENT with me around 7/10/19. We will checking labs; please come fasting: no food or drink other than water for 10-12 hours prior to blood draw.    Here is a list of Health Maintenance topics that are due now or due soon:  Health Maintenance Due   Topic Date Due     ZOSTER IMMUNIZATION (1 of 2) 12/12/2014     PREVENTIVE CARE VISIT  10/28/2016     EYE EXAM  12/01/2017     COLONOSCOPY  08/27/2018     A1C  04/11/2019     DIABETIC FOOT EXAM  04/25/2019     Please call us at [phone] (or use [employer]) to address the above recommendations.     Thank you for trusting [facility] and we appreciate the opportunity to serve you.  We look forward to supporting your healthcare needs in the future.    Healthy Regards,    Dr. Jean/Christine GUZMÁN RN

## 2019-06-10 RX ORDER — INSULIN GLARGINE 100 [IU]/ML
INJECTION, SOLUTION SUBCUTANEOUS
Qty: 3 ML | Refills: 0 | Status: SHIPPED | OUTPATIENT
Start: 2019-06-10 | End: 2019-11-15

## 2019-06-10 NOTE — TELEPHONE ENCOUNTER
"Requested Prescriptions   Pending Prescriptions Disp Refills     insulin glargine (BASAGLAR KWIKPEN) 100 UNIT/ML pen [Pharmacy Med Name: BASAGLAR 100 UNIT/ML KWIKPEN]  0     Sig: INJECT 24 UNITS SUBCUTANEOUS AT BEDTIME  Last Written Prescription Date:  06/01/2018 #45ml x 1  Last filled 04/12/2019  Last office visit: 2/11/2019 DAISY Maldonado   Future Office Visit:  none         Long Acting Insulin Protocol Failed - 6/9/2019  1:42 AM        Failed - HgbA1C in past 3 or 6 months     If HgbA1C is 8 or greater, it needs to be on file within the past 3 months.  If less than 8, must be on file within the past 6 months.     Recent Labs   Lab Test 01/11/19  1056   A1C 8.1*             Passed - Blood pressure less than 140/90 in past 6 months     BP Readings from Last 3 Encounters:   02/11/19 126/74   01/11/19 139/78   11/20/18 158/80                 Passed - LDL on file in past 12 months     Recent Labs   Lab Test 07/23/18  1521   LDL 70             Passed - Microalbumin on file in past 12 months     Recent Labs   Lab Test 07/23/18  1522  04/20/15   MICROALB  --   --  <5.0   MICROL 52   < >  --    UMALCR 26.46*   < > --    < > = values in this interval not displayed.             Passed - Serum creatinine on file in past 12 months     Recent Labs   Lab Test 07/23/18  1521   CR 0.87             Passed - Medication is active on med list        Passed - Patient is age 18 or older        Passed - Recent (6 mo) or future (30 days) visit within the authorizing provider's specialty     Patient had office visit in the last 6 months or has a visit in the next 30 days with authorizing provider or within the authorizing provider's specialty.  See \"Patient Info\" tab in inbasket, or \"Choose Columns\" in Meds & Orders section of the refill encounter.              "

## 2019-06-10 NOTE — TELEPHONE ENCOUNTER
Medication is being filled for 1 time refill only due to:  Last diabetic OV 1/11/19: return around 6 months for routine visit.     Letter mailed to jordan GUZMÁN RN

## 2019-06-18 ENCOUNTER — TRANSFERRED RECORDS (OUTPATIENT)
Dept: HEALTH INFORMATION MANAGEMENT | Facility: CLINIC | Age: 54
End: 2019-06-18

## 2019-06-24 ENCOUNTER — TELEPHONE (OUTPATIENT)
Dept: FAMILY MEDICINE | Facility: CLINIC | Age: 54
End: 2019-06-24

## 2019-06-24 NOTE — TELEPHONE ENCOUNTER
Please abstract the following data from this visit with this patient into the appropriate field in Epic:    Eye exam with ophthalmology on this date: 6/18/2019

## 2019-07-12 NOTE — PROGRESS NOTES
Subjective     Stewart Turcios is a 53 year old male who presents to clinic today for the following health issues:    HPI     Diabetes Follow-up  Glipizide 10mg every day, Insulin - Lantus 24 U every evening and humalog sliding scale, metformin 1,000mg twice daily    How often are you checking your blood sugar? Two times daily    What time of day are you checking your blood sugars (select all that apply)?  Before meals and Before and after meals     Have you had any blood sugars above 200?  Yes in the mornings 288     Have you had any blood sugars below 70?  No    What symptoms do you notice when your blood sugar is low?  Shaky, Dizzy and Weak    What concerns do you have today about your diabetes? Blood sugar is often over 200     Do you have any of these symptoms? (Select all that apply)  No numbness or tingling in feet.  No redness, sores or blisters on feet.  No complaints of excessive thirst.  No reports of blurry vision.  No significant changes to weight.     Have you had a diabetic eye exam in the last 12 months? Yes- Date of last eye exam: 06/2019    Patient notes eats main meal in the evening. Usually will just take basalar 24 U at bedtime. Notes AM sugars are running high. Worried about taking humulog at bedtime because of the possibility of hypoglycemic episode during the night.      BP Readings from Last 2 Encounters:   07/19/19 156/74   02/11/19 126/74     Hemoglobin A1C (%)   Date Value   07/19/2019 8.1 (H)   01/11/2019 8.1 (H)     LDL Cholesterol Calculated (mg/dL)   Date Value   07/19/2019 78   07/23/2018 70       Diabetes Management Resources    - Normal colonoscopy 8/27/2008. No family history of colorectal cancer in first-degree relative. On q 10 year colonoscopy schedule and is due to repeat.      Amount of exercise or physical activity: None    Problems taking medications regularly: No    Medication side effects: none    Diet: low salt, diabetic and breakfast skipped      Reviewed and updated  "as needed this visit by Provider         Review of Systems   ROS COMP: CONSTITUTIONAL:NEGATIVE for fever, chills, change in weight  INTEGUMENTARY/SKIN: NEGATIVE for worrisome rashes, moles or lesions  RESP:NEGATIVE for significant cough or SOB  CV: NEGATIVE for chest pain, palpitations or peripheral edema  MUSCULOSKELETAL: POSITIVE  for arthralgias and myalgias  NEURO: NEGATIVE for weakness, dizziness or paresthesias  PSYCHIATRIC: NEGATIVE for changes in mood or affect    This document serves as a record of the services and decisions personally performed and made by Anahi Jean MD. It was created on his behalf by Aaron Wong, a trained medical scribe. The creation of this document is based the provider's statements to the medical scribe.  Aaron Wong 3:24 PM July 19, 2019        Objective    /74 (BP Location: Left arm, Patient Position: Sitting, Cuff Size: Adult Regular)   Pulse 86   Temp 98.3  F (36.8  C) (Tympanic)   Resp 18   Ht 1.803 m (5' 11\")   Wt 82.6 kg (182 lb 3.2 oz)   SpO2 98%   BMI 25.41 kg/m    Body mass index is 25.41 kg/m .  Physical Exam   GENERAL: alert, pleasant and no distress  RESP: lungs clear to auscultation - no rales, rhonchi or wheezes  CV: regular rate and rhythm, normal S1 S2  MS: no gross musculoskeletal defects noted, no edema  SKIN: no suspicious lesions or rashes  NEURO: Normal strength and tone, mentation intact and speech normal  PSYCH: mentation appears normal, affect normal/bright  Diabetic foot exam: normal DP and PT pulses, no trophic changes or ulcerative lesions and normal sensory exam    Diagnostic Test Results:    Results for orders placed or performed in visit on 07/19/19   Hemoglobin A1c   Result Value Ref Range    Hemoglobin A1C 8.1 (H) 0 - 5.6 %   Albumin Random Urine Quantitative with Creat Ratio   Result Value Ref Range    Creatinine Urine 28 mg/dL    Albumin Urine mg/L <5 mg/L    Albumin Urine mg/g Cr Unable to calculate due to low value 0 - 17 mg/g " Cr   Basic metabolic panel  (Ca, Cl, CO2, Creat, Gluc, K, Na, BUN)   Result Value Ref Range    Sodium 138 133 - 144 mmol/L    Potassium 3.9 3.4 - 5.3 mmol/L    Chloride 105 94 - 109 mmol/L    Carbon Dioxide 27 20 - 32 mmol/L    Anion Gap 6 3 - 14 mmol/L    Glucose 136 (H) 70 - 99 mg/dL    Urea Nitrogen 8 7 - 30 mg/dL    Creatinine 0.87 0.66 - 1.25 mg/dL    GFR Estimate >90 >60 mL/min/[1.73_m2]    GFR Estimate If Black >90 >60 mL/min/[1.73_m2]    Calcium 9.3 8.5 - 10.1 mg/dL   Lipid panel reflex to direct LDL Fasting   Result Value Ref Range    Cholesterol 153 <200 mg/dL    Triglycerides 127 <150 mg/dL    HDL Cholesterol 50 >39 mg/dL    LDL Cholesterol Calculated 78 <100 mg/dL    Non HDL Cholesterol 103 <130 mg/dL             Assessment & Plan     (E11.9,  Z79.4) Controlled type 2 diabetes mellitus without complication, with long-term current use of insulin (H)  (primary encounter diagnosis)  Comment: Uncontrolled, notes a.m. sugars elevated.  Will increase Lantus baseline and give cautious short acting insulin at nighttime.  Reviewed needs to avoid giving too much short acting for the risk of nighttime hypoglycemia.  Plan: Hemoglobin A1c, FOOT EXAM       Monitor call with any concerns or questions.    (E78.5) Hyperlipidemia with target LDL less than 100  Comment: Very good lipid profile.  Plan: Lipid panel reflex to direct LDL Fasting        Continue statin therapy.    (I10) Hypertension goal BP (blood pressure) < 140/90  Comment: Within guidelines.  Excellent renal function.  Plan: Albumin Random Urine Quantitative with Creat         Ratio, Basic metabolic panel  (Ca, Cl, CO2,         Creat, Gluc, K, Na, BUN)        Continue ARB therapy.    (Z12.11) Colon cancer screening  Comment: Preventative screening  Plan: GASTROENTEROLOGY ADULT REF PROCEDURE ONLY [facility] [phone]; No Provider         Preference            Patient Instructions   *    You may increase the Lantus to 26 units.     *      Try  "shorting acting insulin at bedtime and see if the morning sugars improve. Start with 4 units, adjust as needed.     *     For the shoulder pain, back to the gym.     *    The A1c, a 3 month average of your blood sugars, 8.1. Same as January.     *     For the colonoscopy, call:   [facility] [phone]           BMI:   Estimated body mass index is 25.41 kg/m  as calculated from the following:    Height as of this encounter: 1.803 m (5' 11\").    Weight as of this encounter: 82.6 kg (182 lb 3.2 oz).         No follow-ups on file.    The information in this document, created by a scribe for me, accurately reflects the services I personally performed and the decisions made by me. I have reviewed and approved this document for accuracy.     Anahi Jean MD  [facility]        "

## 2019-07-19 ENCOUNTER — OFFICE VISIT (OUTPATIENT)
Dept: FAMILY MEDICINE | Facility: CLINIC | Age: 54
End: 2019-07-19
Payer: COMMERCIAL

## 2019-07-19 VITALS
DIASTOLIC BLOOD PRESSURE: 74 MMHG | WEIGHT: 182.2 LBS | SYSTOLIC BLOOD PRESSURE: 156 MMHG | RESPIRATION RATE: 18 BRPM | BODY MASS INDEX: 25.51 KG/M2 | TEMPERATURE: 98.3 F | HEIGHT: 71 IN | OXYGEN SATURATION: 98 % | HEART RATE: 86 BPM

## 2019-07-19 DIAGNOSIS — E11.9 CONTROLLED TYPE 2 DIABETES MELLITUS WITHOUT COMPLICATION, WITH LONG-TERM CURRENT USE OF INSULIN (H): Primary | ICD-10-CM

## 2019-07-19 DIAGNOSIS — Z12.11 COLON CANCER SCREENING: ICD-10-CM

## 2019-07-19 DIAGNOSIS — E78.5 HYPERLIPIDEMIA WITH TARGET LDL LESS THAN 100: ICD-10-CM

## 2019-07-19 DIAGNOSIS — Z79.4 CONTROLLED TYPE 2 DIABETES MELLITUS WITHOUT COMPLICATION, WITH LONG-TERM CURRENT USE OF INSULIN (H): Primary | ICD-10-CM

## 2019-07-19 DIAGNOSIS — I10 HYPERTENSION GOAL BP (BLOOD PRESSURE) < 140/90: ICD-10-CM

## 2019-07-19 LAB
ANION GAP SERPL CALCULATED.3IONS-SCNC: 6 MMOL/L (ref 3–14)
BUN SERPL-MCNC: 8 MG/DL (ref 7–30)
CALCIUM SERPL-MCNC: 9.3 MG/DL (ref 8.5–10.1)
CHLORIDE SERPL-SCNC: 105 MMOL/L (ref 94–109)
CHOLEST SERPL-MCNC: 153 MG/DL
CO2 SERPL-SCNC: 27 MMOL/L (ref 20–32)
CREAT SERPL-MCNC: 0.87 MG/DL (ref 0.66–1.25)
CREAT UR-MCNC: 28 MG/DL
GFR SERPL CREATININE-BSD FRML MDRD: >90 ML/MIN/{1.73_M2}
GLUCOSE SERPL-MCNC: 136 MG/DL (ref 70–99)
HBA1C MFR BLD: 8.1 % (ref 0–5.6)
HDLC SERPL-MCNC: 50 MG/DL
LDLC SERPL CALC-MCNC: 78 MG/DL
MICROALBUMIN UR-MCNC: <5 MG/L
MICROALBUMIN/CREAT UR: NORMAL MG/G CR (ref 0–17)
NONHDLC SERPL-MCNC: 103 MG/DL
POTASSIUM SERPL-SCNC: 3.9 MMOL/L (ref 3.4–5.3)
SODIUM SERPL-SCNC: 138 MMOL/L (ref 133–144)
TRIGL SERPL-MCNC: 127 MG/DL

## 2019-07-19 PROCEDURE — 99214 OFFICE O/P EST MOD 30 MIN: CPT | Performed by: FAMILY MEDICINE

## 2019-07-19 PROCEDURE — 99207 C FOOT EXAM  NO CHARGE: CPT | Performed by: FAMILY MEDICINE

## 2019-07-19 PROCEDURE — 82043 UR ALBUMIN QUANTITATIVE: CPT | Performed by: FAMILY MEDICINE

## 2019-07-19 PROCEDURE — 36415 COLL VENOUS BLD VENIPUNCTURE: CPT | Performed by: FAMILY MEDICINE

## 2019-07-19 PROCEDURE — 83036 HEMOGLOBIN GLYCOSYLATED A1C: CPT | Performed by: FAMILY MEDICINE

## 2019-07-19 PROCEDURE — 80048 BASIC METABOLIC PNL TOTAL CA: CPT | Performed by: FAMILY MEDICINE

## 2019-07-19 PROCEDURE — 80061 LIPID PANEL: CPT | Performed by: FAMILY MEDICINE

## 2019-07-19 ASSESSMENT — MIFFLIN-ST. JEOR: SCORE: 1688.58

## 2019-07-19 NOTE — PATIENT INSTRUCTIONS
*    You may increase the Lantus to 26 units.     *      Try shorting acting insulin at bedtime and see if the morning sugars improve. Start with 4 units, adjust as needed.     *     For the shoulder pain, back to the gym.     *    The A1c, a 3 month average of your blood sugars, 8.1. Same as January.     *     For the colonoscopy, call:   [facility] [phone]

## 2019-08-07 DIAGNOSIS — E11.9 CONTROLLED TYPE 2 DIABETES MELLITUS WITHOUT COMPLICATION, WITH LONG-TERM CURRENT USE OF INSULIN (H): ICD-10-CM

## 2019-08-07 DIAGNOSIS — Z79.4 CONTROLLED TYPE 2 DIABETES MELLITUS WITHOUT COMPLICATION, WITH LONG-TERM CURRENT USE OF INSULIN (H): ICD-10-CM

## 2019-08-07 RX ORDER — BUPROPION HYDROCHLORIDE 300 MG/1
TABLET ORAL
Qty: 90 TABLET | Refills: 1 | Status: SHIPPED | OUTPATIENT
Start: 2019-08-07 | End: 2020-03-14

## 2019-08-07 RX ORDER — GLIPIZIDE 10 MG/1
TABLET, FILM COATED, EXTENDED RELEASE ORAL
Qty: 90 TABLET | Refills: 1 | Status: SHIPPED | OUTPATIENT
Start: 2019-08-07 | End: 2020-03-14

## 2019-08-07 NOTE — TELEPHONE ENCOUNTER
Prescription approved per G Refill Protocol or patient Primary care provider (PCP) Chart and last OV reviewed   TARA Diaz RN/Malcolm Ledesma

## 2019-08-08 DIAGNOSIS — E78.5 HYPERLIPIDEMIA LDL GOAL <100: ICD-10-CM

## 2019-08-08 RX ORDER — SIMVASTATIN 10 MG
10 TABLET ORAL AT BEDTIME
Qty: 90 TABLET | Refills: 3 | Status: SHIPPED | OUTPATIENT
Start: 2019-08-08 | End: 2020-08-27

## 2019-08-08 NOTE — TELEPHONE ENCOUNTER
"Requested Prescriptions   Pending Prescriptions Disp Refills     simvastatin (ZOCOR) 10 MG tablet [Pharmacy Med Name: SIMVASTATIN 10 MG TABLET] 90 tablet 1     Sig: TAKE 1 TABLET (10 MG) BY MOUTH AT BEDTIME FOR CHOLESTEROL.  Last Written Prescription Date:  12/13/2018 #90 x 1  Last filled 05/11/2019  Last office visit: 7/19/2019 DAISY Jean   Future Office Visit:  None         Statins Protocol Passed - 8/8/2019  1:22 AM        Passed - LDL on file in past 12 months     Recent Labs   Lab Test 07/19/19  1505   LDL 78             Passed - No abnormal creatine kinase in past 12 months     No lab results found.             Passed - Recent (12 mo) or future (30 days) visit within the authorizing provider's specialty     Patient had office visit in the last 12 months or has a visit in the next 30 days with authorizing provider or within the authorizing provider's specialty.  See \"Patient Info\" tab in inbasket, or \"Choose Columns\" in Meds & Orders section of the refill encounter.              Passed - Medication is active on med list        Passed - Patient is age 18 or older          "

## 2019-08-16 ENCOUNTER — TELEPHONE (OUTPATIENT)
Dept: FAMILY MEDICINE | Facility: CLINIC | Age: 54
End: 2019-08-16

## 2019-08-16 NOTE — TELEPHONE ENCOUNTER
Patient is getting a colonoscopy done on 8/23 and was told to check with is PCP office about what to do with his meds.    Isabella Collins [facility]

## 2019-08-20 DIAGNOSIS — E11.9 CONTROLLED TYPE 2 DIABETES MELLITUS WITHOUT COMPLICATION, WITH LONG-TERM CURRENT USE OF INSULIN (H): ICD-10-CM

## 2019-08-20 DIAGNOSIS — Z79.4 CONTROLLED TYPE 2 DIABETES MELLITUS WITHOUT COMPLICATION, WITH LONG-TERM CURRENT USE OF INSULIN (H): ICD-10-CM

## 2019-08-20 NOTE — TELEPHONE ENCOUNTER
Routing refill request to provider for review/approval because:  Blood pressure not in range.    Negar Stuart RN

## 2019-08-20 NOTE — TELEPHONE ENCOUNTER
"Requested Prescriptions   Pending Prescriptions Disp Refills     insulin glargine (LANTUS SOLOSTAR) 100 UNIT/ML pen 6 mL 3     Sig: Inject 24 Units Subcutaneous At Bedtime  Last Written Prescription Date:  05/22/2017 #6ml x 3  Last filled - not provided   Last office visit: 7/19/2019 DAISY Miranda   Future Office Visit:  None    Note: Requesting a 90 day supply, not approved since 2017?         Long Acting Insulin Protocol Failed - 8/20/2019  3:09 PM        Failed - Blood pressure less than 140/90 in past 6 months     BP Readings from Last 3 Encounters:   07/19/19 156/74   02/11/19 126/74   01/11/19 139/78                 Passed - LDL on file in past 12 months     Recent Labs   Lab Test 07/19/19  1505   LDL 78             Passed - Microalbumin on file in past 12 months     Recent Labs   Lab Test 07/19/19  1500  04/20/15   MICROALB  --   --  <5.0   MICROL <5   < >  --    UMALCR Unable to calculate due to low value   < > --    < > = values in this interval not displayed.             Passed - Serum creatinine on file in past 12 months     Recent Labs   Lab Test 07/19/19  1505   CR 0.87             Passed - HgbA1C in past 3 or 6 months     If HgbA1C is 8 or greater, it needs to be on file within the past 3 months.  If less than 8, must be on file within the past 6 months.     Recent Labs   Lab Test 07/19/19  1505   A1C 8.1*             Passed - Medication is active on med list        Passed - Patient is age 18 or older        Passed - Recent (6 mo) or future (30 days) visit within the authorizing provider's specialty     Patient had office visit in the last 6 months or has a visit in the next 30 days with authorizing provider or within the authorizing provider's specialty.  See \"Patient Info\" tab in inbasket, or \"Choose Columns\" in Meds & Orders section of the refill encounter.              "

## 2019-08-21 NOTE — TELEPHONE ENCOUNTER
Called and spoke with patient  Advised per Dr Jean note, to decrease long term insulin the night be fore to 12 units and then ck AM BG take insulin only if needed  He states he has had a low BG 45 with prep, is watching his BG and insulin dosing closer  Will call as need   [facility]  RN/Malcolm Ledesma

## 2019-08-21 NOTE — TELEPHONE ENCOUNTER
Pt is calling back and has for the past few days and needing to know about his medications for Friday.    He is having a colonoscopy this Friday    Aranza Castellanos  [facility] \

## 2019-08-21 NOTE — TELEPHONE ENCOUNTER
I would recommend cutting his night time Lantus or basaglar dose in 1/2 the eveining before his colonoscopy. Check his blood sugar level the morning of his colonoscopy. If < 200, don't take any insulin. If > 200 take 5 units of humalog.

## 2019-08-23 ENCOUNTER — SURGERY (OUTPATIENT)
Age: 54
End: 2019-08-23
Payer: COMMERCIAL

## 2019-08-23 ENCOUNTER — HOSPITAL ENCOUNTER (OUTPATIENT)
Facility: AMBULATORY SURGERY CENTER | Age: 54
Discharge: HOME | End: 2019-08-23
Attending: INTERNAL MEDICINE | Admitting: INTERNAL MEDICINE
Payer: COMMERCIAL

## 2019-08-23 VITALS
DIASTOLIC BLOOD PRESSURE: 77 MMHG | OXYGEN SATURATION: 97 % | RESPIRATION RATE: 18 BRPM | TEMPERATURE: 98 F | HEART RATE: 73 BPM | SYSTOLIC BLOOD PRESSURE: 148 MMHG

## 2019-08-23 LAB
COLONOSCOPY: NORMAL
GLUCOSE BLDC GLUCOMTR-MCNC: 198 MG/DL (ref 70–99)

## 2019-08-23 PROCEDURE — 45385 COLONOSCOPY W/LESION REMOVAL: CPT

## 2019-08-23 PROCEDURE — G8918 PT W/O PREOP ORDER IV AB PRO: HCPCS

## 2019-08-23 PROCEDURE — G8907 PT DOC NO EVENTS ON DISCHARG: HCPCS

## 2019-08-23 PROCEDURE — 45385 COLONOSCOPY W/LESION REMOVAL: CPT | Mod: 33 | Performed by: INTERNAL MEDICINE

## 2019-08-23 PROCEDURE — 88305 TISSUE EXAM BY PATHOLOGIST: CPT | Performed by: INTERNAL MEDICINE

## 2019-08-23 RX ORDER — ONDANSETRON 2 MG/ML
4 INJECTION INTRAMUSCULAR; INTRAVENOUS
Status: DISCONTINUED | OUTPATIENT
Start: 2019-08-23 | End: 2019-08-24 | Stop reason: HOSPADM

## 2019-08-23 RX ORDER — FENTANYL CITRATE 50 UG/ML
INJECTION, SOLUTION INTRAMUSCULAR; INTRAVENOUS PRN
Status: DISCONTINUED | OUTPATIENT
Start: 2019-08-23 | End: 2019-08-23 | Stop reason: HOSPADM

## 2019-08-23 RX ORDER — LIDOCAINE 40 MG/G
CREAM TOPICAL
Status: DISCONTINUED | OUTPATIENT
Start: 2019-08-23 | End: 2019-08-24 | Stop reason: HOSPADM

## 2019-08-23 RX ADMIN — FENTANYL CITRATE 50 MCG: 50 INJECTION, SOLUTION INTRAMUSCULAR; INTRAVENOUS at 07:31

## 2019-08-23 RX ADMIN — FENTANYL CITRATE 25 MCG: 50 INJECTION, SOLUTION INTRAMUSCULAR; INTRAVENOUS at 07:34

## 2019-08-23 RX ADMIN — FENTANYL CITRATE 25 MCG: 50 INJECTION, SOLUTION INTRAMUSCULAR; INTRAVENOUS at 07:36

## 2019-08-27 ENCOUNTER — TELEPHONE (OUTPATIENT)
Dept: GASTROENTEROLOGY | Facility: CLINIC | Age: 54
End: 2019-08-27

## 2019-08-27 LAB — COPATH REPORT: NORMAL

## 2019-08-27 NOTE — TELEPHONE ENCOUNTER
----- Message from Blanca Cuevas DO sent at 8/27/2019 12:40 PM CDT -----  Mr. 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  Given the number of polyps you had and the size of some of them - you need a repeat colonoscopy in 3 years for surveillance.    Please contact the patient with new plan of care.    Blanca Cuevas DO  08/27/19  12:39 PM

## 2019-08-28 NOTE — TELEPHONE ENCOUNTER
Informed pt per Dr. Seals instructions below. Pt asked what it means that the polyps are precancerous and if he needs to worry. Informed him that adenomas are very slow growing and that he does not need to worry- the purpose of having a colonoscopy done every 3 years is to prevent anything from growing into a cancer. Pt vocalizes understanding and does not have any questions at this time.    Nicky Mendez RN  Gastroenterology Care Coordinator  [place], [place]

## 2019-09-03 ENCOUNTER — TELEPHONE (OUTPATIENT)
Dept: FAMILY MEDICINE | Facility: CLINIC | Age: 54
End: 2019-09-03

## 2019-09-03 DIAGNOSIS — E11.9 CONTROLLED TYPE 2 DIABETES MELLITUS WITHOUT COMPLICATION, WITH LONG-TERM CURRENT USE OF INSULIN (H): ICD-10-CM

## 2019-09-03 DIAGNOSIS — Z79.4 CONTROLLED TYPE 2 DIABETES MELLITUS WITHOUT COMPLICATION, WITH LONG-TERM CURRENT USE OF INSULIN (H): ICD-10-CM

## 2019-09-03 NOTE — TELEPHONE ENCOUNTER
Received a fax from [facility] for Jose Luis Rojas    Requesting a 90 day supply, #45ml    Approved 08/20/2019 #6ml x 3 from 08/20/2019 Refill request

## 2019-10-31 ENCOUNTER — HOSPITAL ENCOUNTER (OUTPATIENT)
Facility: CLINIC | Age: 54
Setting detail: OBSERVATION
Discharge: HOME | End: 2019-11-01
Attending: EMERGENCY MEDICINE | Admitting: EMERGENCY MEDICINE
Payer: COMMERCIAL

## 2019-10-31 ENCOUNTER — TELEPHONE (OUTPATIENT)
Dept: FAMILY MEDICINE | Facility: CLINIC | Age: 54
End: 2019-10-31

## 2019-10-31 ENCOUNTER — APPOINTMENT (OUTPATIENT)
Dept: GENERAL RADIOLOGY | Facility: CLINIC | Age: 54
End: 2019-10-31
Attending: EMERGENCY MEDICINE
Payer: COMMERCIAL

## 2019-10-31 DIAGNOSIS — R07.9 CHEST PAIN, UNSPECIFIED TYPE: ICD-10-CM

## 2019-10-31 LAB
ANION GAP SERPL CALCULATED.3IONS-SCNC: 8 MMOL/L (ref 3–14)
BASOPHILS # BLD AUTO: 0 10E9/L (ref 0–0.2)
BASOPHILS NFR BLD AUTO: 0.2 %
BUN SERPL-MCNC: 9 MG/DL (ref 7–30)
CALCIUM SERPL-MCNC: 9.3 MG/DL (ref 8.5–10.1)
CHLORIDE SERPL-SCNC: 103 MMOL/L (ref 94–109)
CO2 SERPL-SCNC: 26 MMOL/L (ref 20–32)
CREAT SERPL-MCNC: 0.88 MG/DL (ref 0.66–1.25)
DIFFERENTIAL METHOD BLD: ABNORMAL
EOSINOPHIL # BLD AUTO: 0 10E9/L (ref 0–0.7)
EOSINOPHIL NFR BLD AUTO: 0.5 %
ERYTHROCYTE [DISTWIDTH] IN BLOOD BY AUTOMATED COUNT: 14.8 % (ref 10–15)
GFR SERPL CREATININE-BSD FRML MDRD: >90 ML/MIN/{1.73_M2}
GLUCOSE BLDC GLUCOMTR-MCNC: 90 MG/DL (ref 70–99)
GLUCOSE SERPL-MCNC: 136 MG/DL (ref 70–99)
HCT VFR BLD AUTO: 41.9 % (ref 40–53)
HGB BLD-MCNC: 13.6 G/DL (ref 13.3–17.7)
IMM GRANULOCYTES # BLD: 0 10E9/L (ref 0–0.4)
IMM GRANULOCYTES NFR BLD: 0.2 %
INR PPP: 0.93 (ref 0.86–1.14)
LYMPHOCYTES # BLD AUTO: 2.4 10E9/L (ref 0.8–5.3)
LYMPHOCYTES NFR BLD AUTO: 40 %
MCH RBC QN AUTO: 23 PG (ref 26.5–33)
MCHC RBC AUTO-ENTMCNC: 32.5 G/DL (ref 31.5–36.5)
MCV RBC AUTO: 71 FL (ref 78–100)
MONOCYTES # BLD AUTO: 0.4 10E9/L (ref 0–1.3)
MONOCYTES NFR BLD AUTO: 6.6 %
NEUTROPHILS # BLD AUTO: 3.1 10E9/L (ref 1.6–8.3)
NEUTROPHILS NFR BLD AUTO: 52.5 %
NRBC # BLD AUTO: 0 10*3/UL
NRBC BLD AUTO-RTO: 0 /100
PLATELET # BLD AUTO: 257 10E9/L (ref 150–450)
POTASSIUM SERPL-SCNC: 3.6 MMOL/L (ref 3.4–5.3)
RBC # BLD AUTO: 5.92 10E12/L (ref 4.4–5.9)
SODIUM SERPL-SCNC: 137 MMOL/L (ref 133–144)
TROPONIN I BLD-MCNC: 0 UG/L (ref 0–0.08)
TROPONIN I SERPL-MCNC: <0.015 UG/L (ref 0–0.04)
TROPONIN I SERPL-MCNC: <0.015 UG/L (ref 0–0.04)
WBC # BLD AUTO: 5.9 10E9/L (ref 4–11)

## 2019-10-31 PROCEDURE — 99285 EMERGENCY DEPT VISIT HI MDM: CPT | Mod: 25 | Performed by: EMERGENCY MEDICINE

## 2019-10-31 PROCEDURE — 00000146 ZZHCL STATISTIC GLUCOSE BY METER IP

## 2019-10-31 PROCEDURE — 25000128 H RX IP 250 OP 636: Performed by: EMERGENCY MEDICINE

## 2019-10-31 PROCEDURE — 84484 ASSAY OF TROPONIN QUANT: CPT | Performed by: PHYSICIAN ASSISTANT

## 2019-10-31 PROCEDURE — 85610 PROTHROMBIN TIME: CPT | Performed by: EMERGENCY MEDICINE

## 2019-10-31 PROCEDURE — 71045 X-RAY EXAM CHEST 1 VIEW: CPT

## 2019-10-31 PROCEDURE — G0378 HOSPITAL OBSERVATION PER HR: HCPCS

## 2019-10-31 PROCEDURE — 93005 ELECTROCARDIOGRAM TRACING: CPT | Performed by: EMERGENCY MEDICINE

## 2019-10-31 PROCEDURE — 93010 ELECTROCARDIOGRAM REPORT: CPT | Mod: Z6 | Performed by: EMERGENCY MEDICINE

## 2019-10-31 PROCEDURE — 96374 THER/PROPH/DIAG INJ IV PUSH: CPT | Performed by: EMERGENCY MEDICINE

## 2019-10-31 PROCEDURE — 25000132 ZZH RX MED GY IP 250 OP 250 PS 637: Performed by: EMERGENCY MEDICINE

## 2019-10-31 PROCEDURE — 84484 ASSAY OF TROPONIN QUANT: CPT

## 2019-10-31 PROCEDURE — 85025 COMPLETE CBC W/AUTO DIFF WBC: CPT | Performed by: EMERGENCY MEDICINE

## 2019-10-31 PROCEDURE — 84484 ASSAY OF TROPONIN QUANT: CPT | Performed by: EMERGENCY MEDICINE

## 2019-10-31 PROCEDURE — 80048 BASIC METABOLIC PNL TOTAL CA: CPT | Performed by: EMERGENCY MEDICINE

## 2019-10-31 RX ORDER — GLIPIZIDE 10 MG/1
10 TABLET, FILM COATED, EXTENDED RELEASE ORAL DAILY
Status: DISCONTINUED | OUTPATIENT
Start: 2019-11-01 | End: 2019-10-31

## 2019-10-31 RX ORDER — LOSARTAN POTASSIUM 50 MG/1
50 TABLET ORAL DAILY
Status: DISCONTINUED | OUTPATIENT
Start: 2019-11-01 | End: 2019-10-31

## 2019-10-31 RX ORDER — NICOTINE POLACRILEX 4 MG
15-30 LOZENGE BUCCAL
Status: DISCONTINUED | OUTPATIENT
Start: 2019-10-31 | End: 2019-11-01 | Stop reason: HOSPADM

## 2019-10-31 RX ORDER — GLIPIZIDE 10 MG/1
10 TABLET, FILM COATED, EXTENDED RELEASE ORAL DAILY
Status: DISCONTINUED | OUTPATIENT
Start: 2019-11-01 | End: 2019-11-01

## 2019-10-31 RX ORDER — DEXTROSE MONOHYDRATE 25 G/50ML
25-50 INJECTION, SOLUTION INTRAVENOUS
Status: DISCONTINUED | OUTPATIENT
Start: 2019-10-31 | End: 2019-11-01 | Stop reason: HOSPADM

## 2019-10-31 RX ORDER — NALOXONE HYDROCHLORIDE 0.4 MG/ML
.1-.4 INJECTION, SOLUTION INTRAMUSCULAR; INTRAVENOUS; SUBCUTANEOUS
Status: DISCONTINUED | OUTPATIENT
Start: 2019-10-31 | End: 2019-11-01 | Stop reason: HOSPADM

## 2019-10-31 RX ORDER — SIMVASTATIN 10 MG
10 TABLET ORAL AT BEDTIME
Status: DISCONTINUED | OUTPATIENT
Start: 2019-10-31 | End: 2019-10-31

## 2019-10-31 RX ORDER — BUPROPION HYDROCHLORIDE 300 MG/1
300 TABLET ORAL DAILY
Status: DISCONTINUED | OUTPATIENT
Start: 2019-11-01 | End: 2019-11-01 | Stop reason: HOSPADM

## 2019-10-31 RX ORDER — ACETAMINOPHEN 500 MG
1000 TABLET ORAL EVERY 6 HOURS PRN
Status: DISCONTINUED | OUTPATIENT
Start: 2019-10-31 | End: 2019-11-01 | Stop reason: HOSPADM

## 2019-10-31 RX ORDER — ACETAMINOPHEN 325 MG/1
650 TABLET ORAL EVERY 4 HOURS PRN
Status: DISCONTINUED | OUTPATIENT
Start: 2019-10-31 | End: 2019-10-31

## 2019-10-31 RX ORDER — ASPIRIN 81 MG/1
81 TABLET ORAL DAILY
Status: DISCONTINUED | OUTPATIENT
Start: 2019-11-01 | End: 2019-10-31

## 2019-10-31 RX ORDER — NITROGLYCERIN 0.4 MG/1
0.4 TABLET SUBLINGUAL EVERY 5 MIN PRN
Status: DISCONTINUED | OUTPATIENT
Start: 2019-10-31 | End: 2019-11-01 | Stop reason: HOSPADM

## 2019-10-31 RX ORDER — LIDOCAINE 40 MG/G
CREAM TOPICAL
Status: DISCONTINUED | OUTPATIENT
Start: 2019-10-31 | End: 2019-11-01 | Stop reason: HOSPADM

## 2019-10-31 RX ORDER — SIMVASTATIN 10 MG
10 TABLET ORAL AT BEDTIME
Status: DISCONTINUED | OUTPATIENT
Start: 2019-10-31 | End: 2019-11-01 | Stop reason: HOSPADM

## 2019-10-31 RX ORDER — INSULIN LISPRO 100 [IU]/ML
1-6 INJECTION, SOLUTION INTRAVENOUS; SUBCUTANEOUS EVERY 4 HOURS
Status: DISCONTINUED | OUTPATIENT
Start: 2019-11-01 | End: 2019-11-01 | Stop reason: HOSPADM

## 2019-10-31 RX ORDER — LORAZEPAM 2 MG/ML
0.5 INJECTION INTRAMUSCULAR ONCE
Status: COMPLETED | OUTPATIENT
Start: 2019-10-31 | End: 2019-10-31

## 2019-10-31 RX ORDER — INSULIN GLARGINE 100 [IU]/ML
24 INJECTION, SOLUTION SUBCUTANEOUS AT BEDTIME
Status: DISCONTINUED | OUTPATIENT
Start: 2019-10-31 | End: 2019-10-31

## 2019-10-31 RX ORDER — BUPROPION HYDROCHLORIDE 300 MG/1
300 TABLET ORAL DAILY
Status: DISCONTINUED | OUTPATIENT
Start: 2019-11-01 | End: 2019-10-31

## 2019-10-31 RX ORDER — LOSARTAN POTASSIUM 50 MG/1
50 TABLET ORAL DAILY
Status: DISCONTINUED | OUTPATIENT
Start: 2019-11-01 | End: 2019-11-01 | Stop reason: HOSPADM

## 2019-10-31 RX ORDER — NITROGLYCERIN 0.4 MG/1
0.4 TABLET SUBLINGUAL ONCE
Status: COMPLETED | OUTPATIENT
Start: 2019-10-31 | End: 2019-10-31

## 2019-10-31 RX ORDER — INSULIN LISPRO 100 [IU]/ML
1 INJECTION, SOLUTION INTRAVENOUS; SUBCUTANEOUS
Status: DISCONTINUED | OUTPATIENT
Start: 2019-10-31 | End: 2019-11-01 | Stop reason: HOSPADM

## 2019-10-31 RX ADMIN — LORAZEPAM 0.5 MG: 2 INJECTION INTRAMUSCULAR; INTRAVENOUS at 14:02

## 2019-10-31 RX ADMIN — NITROGLYCERIN 0.4 MG: 0.4 TABLET SUBLINGUAL at 14:02

## 2019-10-31 ASSESSMENT — ENCOUNTER SYMPTOMS
NAUSEA: 0
ABDOMINAL PAIN: 1
SHORTNESS OF BREATH: 0
DIAPHORESIS: 0
NERVOUS/ANXIOUS: 1

## 2019-10-31 NOTE — ED NOTES
Pt. Presents to the ED with left pain in his rib area. He states this pain is intermittent- comes and goes. Denies nausea and vomiting. Pt. States his brother passed away unexpectedly this past weekend.

## 2019-10-31 NOTE — ED PROVIDER NOTES
[facility] EMERGENCY DEPARTMENT ([facility])    10/31/19        History   No chief complaint on file.    The history is provided by the patient and medical records.     Stewart Turcios is a 53 year old diabetic male who presents to the ER with complaints of crampy type intermittent chest pain in his left lower anterior lateral chest since this morning.  Patient states he has had pain like this in the past and states that he slightly anxious because of his multiple cardiac risk factors.  Patient states that he has had a stress echo in the past and according to [facility] this occurred in 2013 and was a negative exam at [facility].  Patient states that he thinks he may have had a CT Coronary Angiogram as well back then but has not had any problems for the last several years.  Patient states that his brother just passed away and that his parents are in [place].  Patient states he has been under more stress for the past few days and this morning developed his left-sided chest crampy type pain.  Patient denies any nausea, diaphoresis, or shortness of breath associated with the pain and denies any pleuritic component to the pain.  Patient states that currently he is pain-free.  The patient called his primary care clinic who told him to come to the ER for evaluation of his chest pain.  Patient states he did take an aspirin tablet this morning.  Patient's past history is significant for latent TB, diabetes, and hyperlipidemia.    This part of the medical record was transcribed by Loki Canas Medical Scribe, from a dictation done by David Cartagena MD.     I have reviewed the Medications, Allergies, Past Medical and Surgical History, and Social History in the [employer] system.    PAST MEDICAL HISTORY:   Past Medical History:   Diagnosis Date     Benign prostatic hypertrophy with urinary frequency 10/28/2015     Hypertension goal BP (blood pressure) < 140/90 12/7/2015     Type II or unspecified type diabetes mellitus  without mention of complication, not stated as uncontrolled        PAST SURGICAL HISTORY:   Past Surgical History:   Procedure Laterality Date     COLONOSCOPY       COLONOSCOPY WITH CO2 INSUFFLATION N/A 8/23/2019    Procedure: COLONOSCOPY, WITH CO2 INSUFFLATION;  Surgeon: Blanca Cuevas DO;  Location: [facility] OR       FAMILY HISTORY:   Family History   Problem Relation Age of Onset     Hypertension Father      Diabetes Sister      Diabetes Brother      Cancer No family hx of      Cerebrovascular Disease No family hx of      Thyroid Disease No family hx of      Glaucoma No family hx of      Macular Degeneration No family hx of        SOCIAL HISTORY:   Social History     Tobacco Use     Smoking status: Never Smoker     Smokeless tobacco: Never Used   Substance Use Topics     Alcohol use: Yes     Alcohol/week: 0.0 standard drinks     Comment: 3-4 bottles of wine a week        Allergies   Allergen Reactions     No Known Allergies        Dose / Directions   aspirin 81 MG tablet  Commonly known as:  ASA  Used for:  Type II or unspecified type diabetes mellitus without mention of complication, not stated as uncontrolled      1 tab po QD (Once per day)  Quantity:  100  Refills:  3     buPROPion 300 MG 24 hr tablet  Commonly known as:  WELLBUTRIN XL  Used for:  Controlled type 2 diabetes mellitus without complication, with long-term current use of insulin (H)      TAKE 1 TABLET BY MOUTH DAILY IN THE MORNING  Quantity:  90 tablet  Refills:  1     glipiZIDE 10 MG 24 hr tablet  Commonly known as:  GLUCOTROL XL  Used for:  Controlled type 2 diabetes mellitus without complication, with long-term current use of insulin (H)      TAKE 1 TABLET BY MOUTH EVERY DAY  Quantity:  90 tablet  Refills:  1     * insulin glargine 100 UNIT/ML pen  Used for:  Controlled type 2 diabetes mellitus without complication, with long-term current use of insulin (H)      INJECT 24 UNITS SUBCUTANEOUS AT BEDTIME  Quantity:  3 mL  Refills:  0     * insulin  glargine 100 UNIT/ML pen  Commonly known as:  LANTUS SOLOSTAR  Used for:  Controlled type 2 diabetes mellitus without complication, with long-term current use of insulin (H)      Dose:  24 Units  Inject 24 Units Subcutaneous At Bedtime  Quantity:  45 mL  Refills:  0     insulin lispro 100 UNIT/ML pen  Commonly known as:  HumaLOG KWIKpen  Used for:  Controlled type 2 diabetes mellitus without complication, with long-term current use of insulin (H)      Dose:  20 Units  Inject 20 Units Subcutaneous 2 times daily (before meals)  Quantity:  21 mL  Refills:  3     losartan 50 MG tablet  Commonly known as:  COZAAR  Used for:  Essential hypertension with goal blood pressure less than 140/90      TAKE 1 TABLET (50 MG) BY MOUTH DAILY  Quantity:  90 tablet  Refills:  3     metFORMIN 500 MG tablet  Commonly known as:  GLUCOPHAGE  Used for:  Controlled type 2 diabetes mellitus without complication (H)      Dose:  1,000 mg  Take 2 tablets (1,000 mg) by mouth 2 times daily (with meals)  Quantity:  360 tablet  Refills:  3     MULTIVITAMIN PO      Refills:  0     sildenafil 20 MG tablet  Commonly known as:  REVATIO  Used for:  Drug-induced erectile dysfunction      Dose:  20 mg  TAKE 1 TABLET (20 MG) BY MOUTH ONCE AS NEEDED  Quantity:  90 tablet  Refills:  3     simvastatin 10 MG tablet  Commonly known as:  ZOCOR  Used for:  Hyperlipidemia LDL goal <100      Dose:  10 mg  TAKE 1 TABLET (10 MG) BY MOUTH AT BEDTIME FOR CHOLESTEROL.  Quantity:  90 tablet  Refills:  3     VITAMIN D3 PO      Dose:  2,000 Units  Take 2,000 Units by mouth daily  Refills:  0              Review of Systems   Constitutional: Negative for diaphoresis.   Respiratory: Negative for shortness of breath.    Gastrointestinal: Positive for abdominal pain (Left, lower, anterior, latera; intermittent). Negative for nausea.   Psychiatric/Behavioral: The patient is nervous/anxious.    All other systems reviewed and are negative.      Physical Exam   BP: (!)  185/94  Pulse: 93  Heart Rate: 79  Temp: 98.6  F (37  C)  Resp: 20  SpO2: 99 %      Physical Exam  Vitals signs and nursing note reviewed.   Constitutional:       Comments: Alert conversant pleasant   HENT:      Head: Atraumatic.   Eyes:      Extraocular Movements: Extraocular movements intact.      Pupils: Pupils are equal, round, and reactive to light.   Neck:      Musculoskeletal: Neck supple.      Comments: No JVD  Cardiovascular:      Rate and Rhythm: Normal rate and regular rhythm.   Pulmonary:      Breath sounds: Normal breath sounds.   Chest:      Chest wall: No tenderness.   Abdominal:      Tenderness: There is no tenderness.   Musculoskeletal:         General: No swelling, tenderness or deformity.   Skin:     General: Skin is warm.   Neurological:      General: No focal deficit present.      Mental Status: He is oriented to person, place, and time.   Psychiatric:         Mood and Affect: Mood normal.         ED Course         Patient was noted to be slightly hypertensive such that despite being pain-free was given 1 nitro tablet orally in order to bring his blood pressure down.    EKG revealed a normal sinus rhythm at a rate of 93 with a MO interval 0.146 and a QRS duration of 0.086.  The patient had LVH by voltage but a normal axis and no acute ST or T wave changes significant for ischemia.  This is read by me personally.    Patient's care everywhere records were examined from [facility] as well.    Medications   nitroGLYcerin (NITROSTAT) sublingual tablet 0.4 mg (0.4 mg Sublingual Given 10/31/19 1402)   LORazepam (ATIVAN) injection 0.5 mg (0.5 mg Intravenous Given 10/31/19 1402)       Results for orders placed or performed during the hospital encounter of 10/31/19   CBC with platelets differential     Status: Abnormal   Result Value Ref Range    WBC 5.9 4.0 - 11.0 10e9/L    RBC Count 5.92 (H) 4.4 - 5.9 10e12/L    Hemoglobin 13.6 13.3 - 17.7 g/dL    Hematocrit 41.9 40.0 - 53.0 %    MCV 71 (L) 78 - 100 fl     MCH 23.0 (L) 26.5 - 33.0 pg    MCHC 32.5 31.5 - 36.5 g/dL    RDW 14.8 10.0 - 15.0 %    Platelet Count 257 150 - 450 10e9/L    Diff Method Automated Method     % Neutrophils 52.5 %    % Lymphocytes 40.0 %    % Monocytes 6.6 %    % Eosinophils 0.5 %    % Basophils 0.2 %    % Immature Granulocytes 0.2 %    Nucleated RBCs 0 0 /100    Absolute Neutrophil 3.1 1.6 - 8.3 10e9/L    Absolute Lymphocytes 2.4 0.8 - 5.3 10e9/L    Absolute Monocytes 0.4 0.0 - 1.3 10e9/L    Absolute Eosinophils 0.0 0.0 - 0.7 10e9/L    Absolute Basophils 0.0 0.0 - 0.2 10e9/L    Abs Immature Granulocytes 0.0 0 - 0.4 10e9/L    Absolute Nucleated RBC 0.0    Basic metabolic panel     Status: Abnormal   Result Value Ref Range    Sodium 137 133 - 144 mmol/L    Potassium 3.6 3.4 - 5.3 mmol/L    Chloride 103 94 - 109 mmol/L    Carbon Dioxide 26 20 - 32 mmol/L    Anion Gap 8 3 - 14 mmol/L    Glucose 136 (H) 70 - 99 mg/dL    Urea Nitrogen 9 7 - 30 mg/dL    Creatinine 0.88 0.66 - 1.25 mg/dL    GFR Estimate >90 >60 mL/min/[1.73_m2]    GFR Estimate If Black >90 >60 mL/min/[1.73_m2]    Calcium 9.3 8.5 - 10.1 mg/dL   Troponin I     Status: None   Result Value Ref Range    Troponin I ES <0.015 0.000 - 0.045 ug/L   INR     Status: None   Result Value Ref Range    INR 0.93 0.86 - 1.14   EKG 12 lead     Status: None (Preliminary result)   Result Value Ref Range    Interpretation ECG Click View Image link to view waveform and result    Troponin POCT     Status: None   Result Value Ref Range    Troponin I 0.00 0.00 - 0.08 ug/L       Results for orders placed or performed during the hospital encounter of 10/31/19 (from the past 24 hour(s))   EKG 12 lead   Result Value Ref Range    Interpretation ECG Click View Image link to view waveform and result    XR Chest Port 1 View    Narrative    CHEST ONE VIEW PORTABLE  10/31/2019 1:35 PM     HISTORY: Chest pain.    COMPARISON: Chest x-ray on 10/27/2006.      Impression    IMPRESSION: No acute airspace disease.   CBC with  platelets differential   Result Value Ref Range    WBC 5.9 4.0 - 11.0 10e9/L    RBC Count 5.92 (H) 4.4 - 5.9 10e12/L    Hemoglobin 13.6 13.3 - 17.7 g/dL    Hematocrit 41.9 40.0 - 53.0 %    MCV 71 (L) 78 - 100 fl    MCH 23.0 (L) 26.5 - 33.0 pg    MCHC 32.5 31.5 - 36.5 g/dL    RDW 14.8 10.0 - 15.0 %    Platelet Count 257 150 - 450 10e9/L    Diff Method Automated Method     % Neutrophils 52.5 %    % Lymphocytes 40.0 %    % Monocytes 6.6 %    % Eosinophils 0.5 %    % Basophils 0.2 %    % Immature Granulocytes 0.2 %    Nucleated RBCs 0 0 /100    Absolute Neutrophil 3.1 1.6 - 8.3 10e9/L    Absolute Lymphocytes 2.4 0.8 - 5.3 10e9/L    Absolute Monocytes 0.4 0.0 - 1.3 10e9/L    Absolute Eosinophils 0.0 0.0 - 0.7 10e9/L    Absolute Basophils 0.0 0.0 - 0.2 10e9/L    Abs Immature Granulocytes 0.0 0 - 0.4 10e9/L    Absolute Nucleated RBC 0.0    Basic metabolic panel   Result Value Ref Range    Sodium 137 133 - 144 mmol/L    Potassium 3.6 3.4 - 5.3 mmol/L    Chloride 103 94 - 109 mmol/L    Carbon Dioxide 26 20 - 32 mmol/L    Anion Gap 8 3 - 14 mmol/L    Glucose 136 (H) 70 - 99 mg/dL    Urea Nitrogen 9 7 - 30 mg/dL    Creatinine 0.88 0.66 - 1.25 mg/dL    GFR Estimate >90 >60 mL/min/[1.73_m2]    GFR Estimate If Black >90 >60 mL/min/[1.73_m2]    Calcium 9.3 8.5 - 10.1 mg/dL   Troponin I   Result Value Ref Range    Troponin I ES <0.015 0.000 - 0.045 ug/L   INR   Result Value Ref Range    INR 0.93 0.86 - 1.14   Troponin POCT   Result Value Ref Range    Troponin I 0.00 0.00 - 0.08 ug/L       Procedures               Assessments & Plan (with Medical Decision Making)     I have reviewed the nursing notes.    At this time his evaluation is fairly negative.  Patient already took an aspirin tablet and therefore none was given here.  The patient at this time because of his cardiac risk factors and presenting symptoms, will be transferred to the observation unit for chest pain protocol and probable stress echo testing in the morning.  The  patient's blood pressure here did come down nicely with 1 nitro.    /75   Pulse 68   Temp 98.8  F (37.1  C) (Oral)   Resp 20   SpO2 100%     I have reviewed the findings, diagnosis, and plan with the patient.    Final diagnoses:   Chest pain, unspecified type     David aCrtagena MD, MD    ILoki, am serving as a trained medical scribe to document services personally performed by David Cartagena MD, based on the provider's statements to me.   I, David Cartagena MD, was physically present and have reviewed and verified the accuracy of this note documented by Loki Canas.    10/31/2019   [facility], [place], EMERGENCY DEPARTMENT     David Cartagena MD  10/31/19 [id number]

## 2019-10-31 NOTE — TELEPHONE ENCOUNTER
Patient  Was warm passed from [employer],  Driving down town    Chest Pain / Cramping that comes and goes       Onset: this AM  Pt has had emotional loss  His little brother  suddenly over the  week end of heart failure     Description (location/character/radiation/duration): left sided  Pain, cramp  Comes and goes, denies sweating but has had some breathing changes  Rapid he thinks it is from anxiety    Intensity:  moderate    Accompanying signs and symptoms:        Shortness of breath: no        Sweating: no        Nausea/vomitting: no        Palpitations: YES       Other (fevers/chills/cough/heartburn/lightheadedness): YES- ANXIETY and emotional stress     History (similar episodes/previous evaluation): had similar sx in 2019      Precipitating or alleviating factors:       Worse with exertion: no        Worse with breathing: no        Related to eating: no        Better with burping: no     Therapies tried and outcome: None    Pt is driving to get hair cut, advised with history and current emotional stress advised to got  [facility] ED, now,   Need w/u for cardiac due to risk factor   DM, hyperlipidemia , HTN history of latent TB   and family history   advised if  sx change or worsen to stop and call 911   [facility]  RN/Malcolm Ledesma

## 2019-10-31 NOTE — H&P
[facility] ED Observation Admission Note    No chief complaint on file.      Assessment/Plan:  Stewart Turcios is a 53 year old diabetic male who presents to the ER with complaints of crampy type intermittent chest pain in his left lower anterior lateral chest since this morning.     1. Chest pain: acute intermittent crampy left sided chest pain appearing this morning at rest, no radiation, no associated symptoms. Chest pain described pressure like. Chest pain is non radiating and non exertional. Pt reports going under extreme stress relating to the recent passing of his brother. Denies associated nausea, vomiting, diaphoresis, or SOB. Stress Echocardiogram in 2013 was normal. Reports he had CT coronary angiogram in the past, however no such record found in medical chart. In the ED,  Afebrile. Pt initially hypertensive with /94, resolving after NG administration. HR 84, R 20, O2 99% RA. LAB: Na 137, K  3.6, Cr 0.88, BUN 9. CBC with no leukocytosis. HGB 13.6. Trop x 1 negative. EKG non ischemic. INR 0.93. CXR without cardiopulmonary findings.   - Serial troponins q4h x 2 more  - Continuous telemetry  - Exercise Stress Test in the morning  - Nitro PRN  - ASA 81mg daily  - Clear liquid diet after midnight  - Consult Cardiology due to significant family history    2. HTN/HLD: Stable.   -Continue with PTA Losartan    -Continue with PTA Simvastatin and ASA     3. DM type II: Last A1c 8.1 on 1/2019. Glucose 136. Patient currently on insulin Glargine 24 units at bedside, Humalog 20 U subcutaneous BID with meals, Glipizide 10 mg daily, and Metformin 1,000 mg BID    -Hold Glargine and Metformin  -Continue PTA   -MSSI  -Continue with Glipizide   -BG Q4  -Assess for hypoglycemia     4. Depression: -Continue with Wellbutrin     HPI:  Stewart Turcios is a 53 year old diabetic male with a history of DM2, HLD who presented to the ER with complaints of crampy type intermittent chest pain in his left lower anterior lateral chest  "this morning.      Per ED note \"Patient states he has had pain like this in the past and states that he is slightly anxious because of his multiple cardiac risk factors.  Patient states that he has had a stress echo in the past and according to Care Everywhere this occurred in 2013 and was a negative exam at [facility].  Patient states that he thinks he may have had a CT Coronary Angiogram as well back then but has not had any problems for the last several years.  Patient states that his brother just passed away and that his parents are in differ.  Patient states he has been under more stress for the past few days and this morning developed his left-sided chest crampy type pain.  Patient denies any nausea, diaphoresis, palpitations, parestheasia or shortness of breath associated with the pain and denies any pleuritic component to the pain or radiation.  Reports he has occasionally felt gassy, bloated, and reflux symptoms but not today. Patient states that currently he is pain-free.  The patient called his primary care clinic who told him to come to the ER for evaluation of his chest pain.  Patient states he did take an aspirin tablet this morning.  Patient's past history is significant for latent TB, diabetes, and hyperlipidemia\".    Denies tobacco use.  Denies illicit drug use. Drinks ~ 1 bottle of wine a day; or 4-5 bottles of guiness and beer.  Reports that his younger brother, Jessica, passed 4 days ago at age 52 and found blocked arteries on autopsy; he also has DM2.  His younger brother Guillermo (younger) passed in his early 30's at 1995/1996. Stewart his older brother passed at 59./59 y/o probably from alcohol complications. Father is alive in his 90s and has only HTN. Mother is alive at 86 and has only arthritis.  His sister has DM.    Reports the chest pain lasts ~ 5mins. It has been intermittent for many years. Felt anxious. In ED,  Afebrile. Pt initially hypertensive with /94, resolving after NTG " administration. HR 84, R 20, O2 99% RA. LAB: Na 137, K  3.6, Cr 0.88, BUN 9. CBC with no leukocytosis. HGB 13.6. Trop x 1 negative. EKG non ischemic. INR 0.93. CXR without cardiopulmonary findings.      History:    Past Medical History:   Diagnosis Date     Benign prostatic hypertrophy with urinary frequency 10/28/2015     Hypertension goal BP (blood pressure) < 140/90 12/7/2015     Type II or unspecified type diabetes mellitus without mention of complication, not stated as uncontrolled        Past Surgical History:   Procedure Laterality Date     COLONOSCOPY       COLONOSCOPY WITH CO2 INSUFFLATION N/A 8/23/2019    Procedure: COLONOSCOPY, WITH CO2 INSUFFLATION;  Surgeon: Blanca Cuevas DO;  Location: [facility] OR       Family History   Problem Relation Age of Onset     Hypertension Father      Diabetes Sister      Diabetes Brother      Cancer No family hx of      Cerebrovascular Disease No family hx of      Thyroid Disease No family hx of      Glaucoma No family hx of      Macular Degeneration No family hx of      Social History     Socioeconomic History     Marital status:      Spouse name: Not on file     Number of children: Not on file     Years of education: Not on file     Highest education level: Not on file   Occupational History     Not on file   Social Needs     Financial resource strain: Not on file     Food insecurity:     Worry: Not on file     Inability: Not on file     Transportation needs:     Medical: Not on file     Non-medical: Not on file   Tobacco Use     Smoking status: Never Smoker     Smokeless tobacco: Never Used   Substance and Sexual Activity     Alcohol use: Yes     Alcohol/week: 0.0 standard drinks     Comment: 3-4 bottles of wine a week     Drug use: No     Sexual activity: Yes     Partners: Female   Lifestyle     Physical activity:     Days per week: Not on file     Minutes per session: Not on file     Stress: Not on file   Relationships     Social connections:     Talks on phone:  Not on file     Gets together: Not on file     Attends Faith service: Not on file     Active member of club or organization: Not on file     Attends meetings of clubs or organizations: Not on file     Relationship status: Not on file     Intimate partner violence:     Fear of current or ex partner: Not on file     Emotionally abused: Not on file     Physically abused: Not on file     Forced sexual activity: Not on file   Other Topics Concern     Parent/sibling w/ CABG, MI or angioplasty before 65F 55M? Not Asked   Social History Narrative     Not on file       No current facility-administered medications on file prior to encounter.   ASPIRIN 81 MG OR TABS, 1 tab po QD (Once per day)  blood glucose (ACCU-CHEK GUIDE) test strip, Use to test blood sugar 5 times daily or as directed.  blood glucose monitoring (ACCU-CHEK FASTCLIX) lancets, Use to test blood sugar 5 times daily or as directed.  Ok to substitute alternative if insurance prefers.  buPROPion (WELLBUTRIN XL) 300 MG 24 hr tablet, TAKE 1 TABLET BY MOUTH DAILY IN THE MORNING  Cholecalciferol (VITAMIN D3 PO), Take 2,000 Units by mouth daily  glipiZIDE (GLUCOTROL XL) 10 MG 24 hr tablet, TAKE 1 TABLET BY MOUTH EVERY DAY  insulin glargine (BASAGLAR KWIKPEN) 100 UNIT/ML pen, INJECT 24 UNITS SUBCUTANEOUS AT BEDTIME  insulin glargine (LANTUS SOLOSTAR) 100 UNIT/ML pen, Inject 24 Units Subcutaneous At Bedtime  insulin lispro (HUMALOG KWIKPEN) 100 UNIT/ML pen, Inject 20 Units Subcutaneous 2 times daily (before meals)  insulin pen needle (BD MUSA U/F) 32G X 4 MM miscellaneous, Use 4 pen needles daily  losartan (COZAAR) 50 MG tablet, TAKE 1 TABLET (50 MG) BY MOUTH DAILY  metFORMIN (GLUCOPHAGE) 500 MG tablet, Take 2 tablets (1,000 mg) by mouth 2 times daily (with meals)  Multiple Vitamins-Minerals (MULTIVITAMIN OR),   sildenafil (REVATIO) 20 MG tablet, TAKE 1 TABLET (20 MG) BY MOUTH ONCE AS NEEDED  simvastatin (ZOCOR) 10 MG tablet, TAKE 1 TABLET (10 MG) BY MOUTH AT  BEDTIME FOR CHOLESTEROL.      Data:    Results for orders placed or performed during the hospital encounter of 10/31/19   CBC with platelets differential     Status: Abnormal   Result Value Ref Range    WBC 5.9 4.0 - 11.0 10e9/L    RBC Count 5.92 (H) 4.4 - 5.9 10e12/L    Hemoglobin 13.6 13.3 - 17.7 g/dL    Hematocrit 41.9 40.0 - 53.0 %    MCV 71 (L) 78 - 100 fl    MCH 23.0 (L) 26.5 - 33.0 pg    MCHC 32.5 31.5 - 36.5 g/dL    RDW 14.8 10.0 - 15.0 %    Platelet Count 257 150 - 450 10e9/L    Diff Method Automated Method     % Neutrophils 52.5 %    % Lymphocytes 40.0 %    % Monocytes 6.6 %    % Eosinophils 0.5 %    % Basophils 0.2 %    % Immature Granulocytes 0.2 %    Nucleated RBCs 0 0 /100    Absolute Neutrophil 3.1 1.6 - 8.3 10e9/L    Absolute Lymphocytes 2.4 0.8 - 5.3 10e9/L    Absolute Monocytes 0.4 0.0 - 1.3 10e9/L    Absolute Eosinophils 0.0 0.0 - 0.7 10e9/L    Absolute Basophils 0.0 0.0 - 0.2 10e9/L    Abs Immature Granulocytes 0.0 0 - 0.4 10e9/L    Absolute Nucleated RBC 0.0    Basic metabolic panel     Status: Abnormal   Result Value Ref Range    Sodium 137 133 - 144 mmol/L    Potassium 3.6 3.4 - 5.3 mmol/L    Chloride 103 94 - 109 mmol/L    Carbon Dioxide 26 20 - 32 mmol/L    Anion Gap 8 3 - 14 mmol/L    Glucose 136 (H) 70 - 99 mg/dL    Urea Nitrogen 9 7 - 30 mg/dL    Creatinine 0.88 0.66 - 1.25 mg/dL    GFR Estimate >90 >60 mL/min/[1.73_m2]    GFR Estimate If Black >90 >60 mL/min/[1.73_m2]    Calcium 9.3 8.5 - 10.1 mg/dL   Troponin I     Status: None   Result Value Ref Range    Troponin I ES <0.015 0.000 - 0.045 ug/L   INR     Status: None   Result Value Ref Range    INR 0.93 0.86 - 1.14   EKG 12 lead     Status: None (Preliminary result)   Result Value Ref Range    Interpretation ECG Click View Image link to view waveform and result    Troponin POCT     Status: None   Result Value Ref Range    Troponin I 0.00 0.00 - 0.08 ug/L             EKG Interpretation:      EKG Number: 1  Interpreted by Adela Ly,  PA-C  Symptoms at time of EKG: None   Rhythm: Normal sinus   Rate: 93  Axis: Normal  Ectopy: None  Conduction: Normal  ST Segments/ T Waves: No ST-T wave changes and No acute ischemic changes  Q Waves: None  Comparison to prior: No old EKG available    Clinical Impression: normal EKG    ROS:  REVIEW OF SYSTEMS:   General: fatigue   EYES: Negative for vision changes or eye problems   ENT: No problems with ears, nose or throat. No difficulty swallowing.   RESP: No coughing, wheezing or shortness of breath   CV: Positive for chest pain   GI: No nausea, vomiting, heartburn, abdominal pain, diarrhea, constipation or change in bowel habits   : No urinary frequency or dysuria, bladder or kidney problems   MUSCULOSKELETAL: No significant muscle or joint pains   NEUROLOGIC: No headaches, numbness, tingling, weakness, problems with balance or coordination   PSYCHIATRIC: No problems with anxiety, depression or mental health   HEME/IMMUNE/ALLERGY: No history of bleeding or clotting problems or anemia. No allergies or immune system problems   ENDOCRINE: No history of thyroid disease, diabetes or other endocrine disorders   SKIN: No rashes,worrisome lesions or skin problems      PCP: LINDA MALDONADO   CARDIAC RISK: HTN, HLD, DM type 2    10 point ROS negative other than the symptoms noted above.      Exam:    Vitals:  B/P: 105/70, T: 98.8, P: 68, R: 13    Physical Exam   Constitutional: Pt is oriented to person, place, and time.Pt appears well-developed and well-nourished.   HENT:   Head: Normocephalic and atraumatic.   Eyes: Conjunctivae are normal. Pupils are equal, round, and reactive to light.   Neck: Normal range of motion. Neck supple.   Cardiovascular: Normal rate, regular rhythm, normal heart sounds and intact distal pulses.    Pulmonary/Chest: Effort normal and breath sounds normal. No respiratory distress. Pt has no wheezes. Pt has no rales  Abdominal: Soft. Bowel sounds are normal. Pt exhibits no distension and no  mass. No tenderness. Pt has no rebound and no guarding.   Musculoskeletal: Normal range of motion. Pt exhibits no edema.   Neurological: Pt is alert and oriented to person, place, and time. Normal reflexes.   Skin: Skin is warm and dry. No rash noted.   Psychiatric: Pt has a normal mood and affect. Behavior is normal. Judgment and thought content normal.     Consults: Low threshold   FEN: Clears at MN  DVT prophylaxis: Early ambulation  Code Status: Full  Disposition: Stable vital signs. Patient return to baseline.  All labs/images reviewed    Signed:  Adair Maya PA-C   October 31, 2019 at 5:35 PM

## 2019-11-01 ENCOUNTER — APPOINTMENT (OUTPATIENT)
Dept: CARDIOLOGY | Facility: CLINIC | Age: 54
End: 2019-11-01
Attending: PHYSICIAN ASSISTANT
Payer: COMMERCIAL

## 2019-11-01 VITALS
TEMPERATURE: 98.5 F | RESPIRATION RATE: 18 BRPM | DIASTOLIC BLOOD PRESSURE: 83 MMHG | HEART RATE: 68 BPM | OXYGEN SATURATION: 99 % | BODY MASS INDEX: 27.7 KG/M2 | HEIGHT: 68 IN | SYSTOLIC BLOOD PRESSURE: 129 MMHG

## 2019-11-01 LAB
ANION GAP SERPL CALCULATED.3IONS-SCNC: 7 MMOL/L (ref 3–14)
BASOPHILS # BLD AUTO: 0 10E9/L (ref 0–0.2)
BASOPHILS NFR BLD AUTO: 0.6 %
BUN SERPL-MCNC: 10 MG/DL (ref 7–30)
CALCIUM SERPL-MCNC: 8.8 MG/DL (ref 8.5–10.1)
CHLORIDE SERPL-SCNC: 105 MMOL/L (ref 94–109)
CO2 SERPL-SCNC: 25 MMOL/L (ref 20–32)
CREAT SERPL-MCNC: 1 MG/DL (ref 0.66–1.25)
DIFFERENTIAL METHOD BLD: ABNORMAL
EOSINOPHIL # BLD AUTO: 0.1 10E9/L (ref 0–0.7)
EOSINOPHIL NFR BLD AUTO: 2.5 %
ERYTHROCYTE [DISTWIDTH] IN BLOOD BY AUTOMATED COUNT: 15.7 % (ref 10–15)
GFR SERPL CREATININE-BSD FRML MDRD: 84 ML/MIN/{1.73_M2}
GLUCOSE BLDC GLUCOMTR-MCNC: 166 MG/DL (ref 70–99)
GLUCOSE BLDC GLUCOMTR-MCNC: 200 MG/DL (ref 70–99)
GLUCOSE BLDC GLUCOMTR-MCNC: 90 MG/DL (ref 70–99)
GLUCOSE SERPL-MCNC: 128 MG/DL (ref 70–99)
HCT VFR BLD AUTO: 39.9 % (ref 40–53)
HGB BLD-MCNC: 12.2 G/DL (ref 13.3–17.7)
IMM GRANULOCYTES # BLD: 0 10E9/L (ref 0–0.4)
IMM GRANULOCYTES NFR BLD: 0.2 %
INTERPRETATION ECG - MUSE: NORMAL
LYMPHOCYTES # BLD AUTO: 2.7 10E9/L (ref 0.8–5.3)
LYMPHOCYTES NFR BLD AUTO: 51.4 %
MCH RBC QN AUTO: 21.8 PG (ref 26.5–33)
MCHC RBC AUTO-ENTMCNC: 30.6 G/DL (ref 31.5–36.5)
MCV RBC AUTO: 71 FL (ref 78–100)
MONOCYTES # BLD AUTO: 0.5 10E9/L (ref 0–1.3)
MONOCYTES NFR BLD AUTO: 9.9 %
NEUTROPHILS # BLD AUTO: 1.8 10E9/L (ref 1.6–8.3)
NEUTROPHILS NFR BLD AUTO: 35.4 %
NRBC # BLD AUTO: 0 10*3/UL
NRBC BLD AUTO-RTO: 0 /100
PLATELET # BLD AUTO: 269 10E9/L (ref 150–450)
POTASSIUM SERPL-SCNC: 4 MMOL/L (ref 3.4–5.3)
RBC # BLD AUTO: 5.6 10E12/L (ref 4.4–5.9)
SODIUM SERPL-SCNC: 137 MMOL/L (ref 133–144)
TROPONIN I SERPL-MCNC: <0.015 UG/L (ref 0–0.04)
WBC # BLD AUTO: 5.2 10E9/L (ref 4–11)

## 2019-11-01 PROCEDURE — 93018 CV STRESS TEST I&R ONLY: CPT | Performed by: INTERNAL MEDICINE

## 2019-11-01 PROCEDURE — 40000264 ECHO STRESS ECHOCARDIOGRAM

## 2019-11-01 PROCEDURE — 96372 THER/PROPH/DIAG INJ SC/IM: CPT

## 2019-11-01 PROCEDURE — G0378 HOSPITAL OBSERVATION PER HR: HCPCS

## 2019-11-01 PROCEDURE — 99204 OFFICE O/P NEW MOD 45 MIN: CPT | Mod: 25 | Performed by: INTERNAL MEDICINE

## 2019-11-01 PROCEDURE — 93325 DOPPLER ECHO COLOR FLOW MAPG: CPT | Mod: 26 | Performed by: INTERNAL MEDICINE

## 2019-11-01 PROCEDURE — 36415 COLL VENOUS BLD VENIPUNCTURE: CPT | Performed by: PHYSICIAN ASSISTANT

## 2019-11-01 PROCEDURE — 93321 DOPPLER ECHO F-UP/LMTD STD: CPT | Mod: 26 | Performed by: INTERNAL MEDICINE

## 2019-11-01 PROCEDURE — 93350 STRESS TTE ONLY: CPT | Mod: 26 | Performed by: INTERNAL MEDICINE

## 2019-11-01 PROCEDURE — 84484 ASSAY OF TROPONIN QUANT: CPT | Performed by: PHYSICIAN ASSISTANT

## 2019-11-01 PROCEDURE — 85025 COMPLETE CBC W/AUTO DIFF WBC: CPT | Performed by: PHYSICIAN ASSISTANT

## 2019-11-01 PROCEDURE — 25000132 ZZH RX MED GY IP 250 OP 250 PS 637: Performed by: PHYSICIAN ASSISTANT

## 2019-11-01 PROCEDURE — 93016 CV STRESS TEST SUPVJ ONLY: CPT | Performed by: INTERNAL MEDICINE

## 2019-11-01 PROCEDURE — 25500064 ZZH RX 255 OP 636: Performed by: EMERGENCY MEDICINE

## 2019-11-01 PROCEDURE — 00000146 ZZHCL STATISTIC GLUCOSE BY METER IP

## 2019-11-01 PROCEDURE — 80048 BASIC METABOLIC PNL TOTAL CA: CPT | Performed by: PHYSICIAN ASSISTANT

## 2019-11-01 RX ORDER — LORAZEPAM 0.5 MG/1
0.5 TABLET ORAL ONCE
Status: COMPLETED | OUTPATIENT
Start: 2019-11-01 | End: 2019-11-01

## 2019-11-01 RX ORDER — INSULIN LISPRO 100 [IU]/ML
20 INJECTION, SOLUTION INTRAVENOUS; SUBCUTANEOUS 2 TIMES DAILY
Status: DISCONTINUED | OUTPATIENT
Start: 2019-11-01 | End: 2019-11-01 | Stop reason: HOSPADM

## 2019-11-01 RX ORDER — GLIPIZIDE 10 MG/1
10 TABLET ORAL
Status: DISCONTINUED | OUTPATIENT
Start: 2019-11-02 | End: 2019-11-01 | Stop reason: HOSPADM

## 2019-11-01 RX ADMIN — INSULIN LISPRO 2 UNITS: 100 INJECTION, SOLUTION INTRAVENOUS; SUBCUTANEOUS at 12:37

## 2019-11-01 RX ADMIN — BUPROPION HYDROCHLORIDE 300 MG: 300 TABLET ORAL at 08:12

## 2019-11-01 RX ADMIN — Medication 10 MG: at 02:11

## 2019-11-01 RX ADMIN — PERFLUTREN 4 ML: 6.52 INJECTION, SUSPENSION INTRAVENOUS at 09:19

## 2019-11-01 RX ADMIN — LORAZEPAM 0.5 MG: 0.5 TABLET ORAL at 02:11

## 2019-11-01 RX ADMIN — LOSARTAN POTASSIUM 50 MG: 50 TABLET ORAL at 08:11

## 2019-11-01 NOTE — CONSULTS
"[facility], [place]  Consult Note  Date of Admission:  10/31/2019  Consult Requested by: Dr. Maya  Reason for Consult: Evaluation of Chest Pain    Assessment & Plan   Stewart Turcios is a 53 year old male admitted on 10/31/2019 with complaints of crampy type intermittent chest pain in his left lower anterior lateral chest since this morning.  Patient's past history is significant for latent TB, diabetes, and hyperlipidemia.    Problems    Chest Pain: resolved  States had crampy chest pain like this in past, resolved with stretching and relaxation.  EKG shows normal rate, sinus rhythm, no ST elevations when compared to past EKG.  Troponin <0.015. Normal stress echocardiogram with no evidence of stress-induced ischemia. Normal resting LV function with EF of approximately 60-65%; normal response to exercise with increase to approximately 70-75%.  Triglycerides, HDL/LDL, Cholesterol WNL.   -Monitor  -Educate of CV health, specifically decreased alcohol intake  -Coronary CT if chest pain persists    Chronic Conditions  HTN:Patient arrived in ED with /94.  Nitroglycerine administered.  BP stabilized overnight at 129/80.  Diabetes: requiring follow up  HbA1c >8.0 for since 4/2019.  -Follow up request with PCP for glucose control.    The patient's care was discussed with the Attending Physician, Dr. Gimenez.    Steve Gallagher, MARIAHS  [facility], [place]  Pager: [phone]  Please see sticky note for cross cover information  ______________________________________________________________________    Chief Complaint   \"I was having some crampy pain in my left mid chest.\"    History is obtained from the patient    History of Present Illness   Stewart Turcios is a 53 year old diabetic male who presents to the ER with complaints of crampy type intermittent chest pain in his left lower anterior lateral chest since the morning of 10/31/19.  Patient states he has had pain " like this in the past and states that he slightly anxious because of his multiple cardiac risk factors.  Patient states that he has had a stress echo in the past and according to Care Everywhere this occurred in 2013 and was a negative exam at [facility].  Patient states that he thinks he may have had a CT Coronary Angiogram as well back then but has not had any problems for the last several years.  Patient states that his brother just passed away and that his parents are in [place].  He has been under more stress for the past few days and this morning developed his left-sided chest crampy type pain. Denies any nausea, diaphoresis, or shortness of breath associated with the pain and denies any pleuritic component to the pain.  Currently he is pain-free.  The patient called his primary care clinic who told him to come to the ER for evaluation of his chest pain.  Patient's past history is significant for latent TB, diabetes, and hyperlipidemia.  Review of Systems   The 10 point Review of Systems is negative other than noted in the HPI or here.     Past Medical History    I have reviewed this patient's medical history and updated it with pertinent information if needed.   Past Medical History:   Diagnosis Date     Benign prostatic hypertrophy with urinary frequency 10/28/2015     Hypertension goal BP (blood pressure) < 140/90 12/7/2015     Type II or unspecified type diabetes mellitus without mention of complication, not stated as uncontrolled        Past Surgical History   I have reviewed this patient's surgical history and updated it with pertinent information if needed.  Past Surgical History:   Procedure Laterality Date     COLONOSCOPY       COLONOSCOPY WITH CO2 INSUFFLATION N/A 8/23/2019    Procedure: COLONOSCOPY, WITH CO2 INSUFFLATION;  Surgeon: Blanca Cuevas DO;  Location:  OR       Social History   I have reviewed this patient's social history and updated it with pertinent information if needed.  Social  History     Tobacco Use     Smoking status: Never Smoker     Smokeless tobacco: Never Used   Substance Use Topics     Alcohol use: Yes     Alcohol/week: 0.0 standard drinks     Comment: 3-4 bottles of wine a week     Drug use: No       Family History   I have reviewed this patient's family history and updated it with pertinent information if needed.   Family History   Problem Relation Age of Onset     Hypertension Father      Diabetes Sister      Diabetes Brother      Cancer No family hx of      Cerebrovascular Disease No family hx of      Thyroid Disease No family hx of      Glaucoma No family hx of      Macular Degeneration No family hx of        Medications   I have reviewed this patient's current medications    Allergies   Allergies   Allergen Reactions     No Known Allergies        Physical Exam   Vital Signs: Temp: 98.3  F (36.8  C) Temp src: Oral BP: 129/80 Pulse: 68 Heart Rate: 67 Resp: 18 SpO2: 100 % O2 Device: None (Room air)    Weight: 0 lbs 0 oz    Constitutional: awake, alert, cooperative, no apparent distress  Eyes: Pupils equal, round and reactive to light, extra ocular muscles intact, sclera clear  ENT: Normocephalic, without obvious abnormality, external ears without lesions, oral pharynx with moist mucous membranes, gums normal and good dentition.  Hematologic / Lymphatic: no cervical lymphadenopathy  Respiratory: No increased work of breathing, good air exchange, clear to auscultation bilaterally, no crackles or wheezing  Cardiovascular: Normal apical impulse, regular rate and rhythm, normal S1 and S2, no S3 or S4, and no murmur noted  GI: No scars, normal bowel sounds, soft, non-distended, non-tender, no masses palpated, no hepatosplenomegally  Skin: no bruising or bleeding  Musculoskeletal: There is no redness, warmth, or swelling of the joints.  Full range of motion noted.  Motor strength is 5 out of 5 all extremities bilaterally.  Tone is normal.  Neurologic: Awake, alert, oriented to name,  place and time.      Data   I personally reviewed the EKG tracing showing Normal rate, sinus rhythm, normal axis, no ST elevations, regular AK and QRS intervals.      I saw and evaluated, examined the patient with resident.  I discussed the results of vital signs, labs, imaging and other tests with patient and resident.  I discussed the assessment and therapeutic plan at discharge with patient and resident.  I agree with the note of the resident and I edited this.  35 min were spent directly with patient.    Isaac Rice MD, PhD  Professor of Medicine  Division of Cardiology

## 2019-11-01 NOTE — DISCHARGE SUMMARY
[facility], [place]  Hospitalist Discharge Summary       Date of Admission:  10/31/2019  Date of Discharge:  11/1/2019  Discharging Provider: ANAI Stark CNP  Discharge Team: Emergency Department Observation Unit    Discharge Diagnoses   Chest pain    Follow-ups Needed After Discharge   Follow-up Appointments     Adult [facility] Follow-up and recommended labs and tests      Follow up with PCP in one week for diabetes management and   depression/anxiety management, return to the ER if having chest pain,   syncope, shortness of breath or fever greater than 101 degrees.    Appointments on [place] and/or [facility] (with [facility] or [facility]   provider or service). Call [phone] if you haven't heard regarding   these appointments within 7 days of discharge.         {Additional follow-up instructions/to-do's for PCP    : Diabetic and Depression management.     Unresulted Labs Ordered in the Past 30 Days of this Admission     No orders found for last 31 day(s).      These results will be followed up by PCP    Discharge Disposition   Discharged to home  Condition at discharge: Stable    Hospital Course   Stewart Turcios is a 53 year old diabetic male who presents to the ER with complaints of crampy type intermittent chest pain in his left lower anterior lateral chest since this morning.      1. Chest pain: acute intermittent crampy left sided chest pain appearing yesterday morning at rest, no radiation, no associated symptoms. Denies any chest pain currently. Pt reports going under extreme stress as his brother passed away  last week. Autopsy showed  blocked arteries;  . Denies associated nausea, vomiting, diaphoresis, or SOB. Stress Echocardiogram in 2013 was normal. Reports he had CT coronary angiogram in the past, however no such record found in medical chart. In the ED,  Afebrile. Pt initially hypertensive with /94, resolving after NG administration. HR 84, R 20, O2 99%  RA. LAB: Na 137, K  3.6, Cr 0.88, BUN 9. CBC with no leukocytosis. HGB 13.6. Trop negative x 3. EKG non ischemic. INR 0.93. CXR without cardiopulmonary findings. Stress test normal. Cardiology consulted due to significant family history. Cardiology had no further recommendations as far as testing. Recommended patient follow up with PCP for better control of his diabetes.      2. HTN/HLD: Stable.   -Continue with PTA Losartan    -Continue with PTA Simvastatin and ASA      3. DM type II: Last A1c 8.1 on 1/2019. Glucose 136. Patient currently on insulin Glargine 24 units at bedside, Humalog 20 U subcutaneous BID with meals, Glipizide 10 mg daily, and Metformin 1,000 mg BID. Continue at discharge. Recommend follow up with PCP for better BG control.      4. Depression: -Continue with Wellbutrin        Consultations This Hospital Stay   CARDIOLOGY GENERAL ADULT IP CONSULT    Code Status   Full Code    Time Spent on this Encounter   I, ANAI Stark CNP, personally saw the patient today and spent less than or equal to 30 minutes discharging this patient.       ANAI Stark CNP  [facility] [place]  ______________________________________________________________________    Physical Exam   Vital Signs: Temp: 98.3  F (36.8  C) Temp src: Oral BP: 129/80 Pulse: 68 Heart Rate: 67 Resp: 18 SpO2: 100 % O2 Device: None (Room air)    Weight: 0 lbs 0 oz  Constitutional: healthy, alert and no distress   Head: Normocephalic. No masses, lesions, tenderness or abnormalities   Neck: Neck supple. No adenopathy. Thyroid symmetric, normal size,, Carotids without bruits.   ENT: ENT exam normal, no neck nodes or sinus tenderness   Cardiovascular: RRR. No murmurs, clicks gallops or rub   Respiratory: . Good diaphragmatic excursion. Lungs clear   Gastrointestinal: Abdomen soft,. BS normal. No masses, organomegaly.   : Deferred   Musculoskeletal: extremities normal- no gross deformities noted, gait  normal and normal muscle tone   Skin: no suspicious lesions or rashes   Neurologic: Gait normal. Reflexes normal and symmetric. Sensation grossly WNL.   Psychiatric: mentation appears normal and affect normal/bright   Hematologic/Lymphatic/Immunologic: normal ant/post cervical, axillary, supraclavicular and inguinal        Primary Care Physician   Anahi Jean    Discharge Orders      Reason for your hospital stay    Chest pain     Adult [facility] Follow-up and recommended labs and tests    Follow up with PCP in one week for diabetes management and depression/anxiety management, return to the ER if having chest pain, syncope, shortness of breath or fever greater than 101 degrees.    Appointments on [place] and/or [facility] (with [facility] or [facility] provider or service). Call [phone] if you haven't heard regarding these appointments within 7 days of discharge.     Activity    Your activity upon discharge: activity as tolerated     When to contact your care team    Please go to your nearest emergency room If you were to have a change in the type of chest pain i.e more severe, lasting longer or radiating to your shoulder, arm, neck, jaw or back, shortness of breath or increased pain with breathing, coughing up blood, feel dizzy or lightheaded, or notice swelling in one leg.     Discharge Instructions    The chest pain you came into the emergency room for does not appear to be cardiac chest pain. Your heart enzymes were normal which tells us there was no damage to the heart muscle. Your stress test was normal. Cardiology was consulted and did not recommend any further testing. Recommended following up with your primary care doctor in one week for better control of your diabetes.     Your pain may be related to anxiety. Discussed Ativan is  generally regarded with disfavor due to dependence and tolerance issues. Discussed the option of DBT, Self help programs as well as exercise. Recommend further discussion  with your primary care doctor for management of your depression and anxiety.     Diet    Follow this diet upon discharge: Diabetic diet.       Significant Results and Procedures   Results for orders placed or performed during the hospital encounter of 10/31/19   XR Chest Port 1 View    Narrative    CHEST ONE VIEW PORTABLE  10/31/2019 1:35 PM     HISTORY: Chest pain.    COMPARISON: Chest x-ray on 10/27/2006.      Impression    IMPRESSION: No acute airspace disease.    DONI SEGURA MD   Echo Stress Echocardiogram    Narrative    [id number]  [id number]  [id number]  [id number]^ALEKS^MIGUEL^MARLYN           [facility]  Echocardiography Laboratory  [address] [place] [address]  Name: CARLOS COLLAZO  MRN: [id number]  : 1965  Study Date: 2019 09:19 AM  Age: 53 yrs  Gender: Male  Patient Location: [facility] [place]  Reason For Study: Chest Pain  Ordering Physician: MIGUEL FINK  Performed By: GUERO Armando     BSA: 2.0 m2  Height: 71 in  Weight: 182 lb  HR: 76  BP: 142/80 mmHg  _____________________________________________________________________________  __        Procedure  Stress Echo Bike with two dimensional, color and spectral Doppler performed.  _____________________________________________________________________________  __        Interpretation Summary  This was a normal stress echocardiogram with no evidence of stress-induced  ischemia. Normal resting LV function with EF of approximately 60-65%; normal  response to exercise with increase to approximately 70-75%. No stress induced  regional wall motion abnormalities. No ECG evidence of ischemia. No  significant valvular disease noted on routine screening color flow Doppler and  pulsed Doppler examination. No resting wall motion abnormalities.  Reduced functional capacity.  Exercise was stopped due to fatigue.  The patient did not exhibit any symptoms during exercise.  Normal blood pressure response with  stress.  _____________________________________________________________________________  __     Stress  This was a normal stress EKG with no evidence of stress-induced ischemia.  Peak MVO2 14.5 ml/kg/min .  Percent predicted MVO2 53 %.  RPP 28,224.  Maximum workload 100 mcfadden.  Target Heart Rate was achieved.  Exercise was stopped due to fatigue.  The patient did not exhibit any symptoms during exercise.  Normal blood pressure response with stress.     Rest  Normal baseline electrocardiogram.     Stress Results                                       Maximum Predicted HR:   166 bpm             Target HR: 141 bpm        % Maximum Predicted HR: 87 %                             Stage  DurationHeart Rate  BP                                 (mm:ss)   (bpm)                         Baseline  0:00      76    142/80                           Peak    6:02     144    196/99                            Stress Duration:   6:02 mm:ss *                      Maximum Stress HR: 144 bpm *     Vessels  Aortic size normal.     Contrast  Definity (NDC #42051-268-74) given intravenously. Patient was given 4ml  mixture of 1.5ml Definity and 8.5ml saline. 6 ml wasted. IV start location L  Forearm . Definity Expiration 06/01/2020 . Definity Lot # 6239 .     _____________________________________________________________________________  __  MMode/2D Measurements & Calculations  asc Aorta Diam: 2.9 cm                    _____________________________________________________________________________  __        Report approved by: Mark Crawford 11/01/2019 09:51 AM            Discharge Medications   Current Discharge Medication List      CONTINUE these medications which have NOT CHANGED    Details   ASPIRIN 81 MG OR TABS 1 tab po QD (Once per day)  Qty: 100, Refills: 3    Associated Diagnoses: Type II or unspecified type diabetes mellitus without mention of complication, not stated as uncontrolled      blood glucose (ACCU-CHEK GUIDE) test strip Use  to test blood sugar 5 times daily or as directed.  Qty: 400 strip, Refills: 11    Comments: Has co-pay card if needed  Associated Diagnoses: Controlled type 2 diabetes mellitus without complication, with long-term current use of insulin (H)      blood glucose monitoring (ACCU-CHEK FASTCLIX) lancets Use to test blood sugar 5 times daily or as directed.  Ok to substitute alternative if insurance prefers.  Qty: 102 each, Refills: 11    Associated Diagnoses: Controlled type 2 diabetes mellitus without complication, with long-term current use of insulin (H)      buPROPion (WELLBUTRIN XL) 300 MG 24 hr tablet TAKE 1 TABLET BY MOUTH DAILY IN THE MORNING  Qty: 90 tablet, Refills: 1    Associated Diagnoses: Controlled type 2 diabetes mellitus without complication, with long-term current use of insulin (H)      Cholecalciferol (VITAMIN D3 PO) Take 2,000 Units by mouth daily      glipiZIDE (GLUCOTROL XL) 10 MG 24 hr tablet TAKE 1 TABLET BY MOUTH EVERY DAY  Qty: 90 tablet, Refills: 1    Associated Diagnoses: Controlled type 2 diabetes mellitus without complication, with long-term current use of insulin (H)      !! insulin glargine (BASAGLAR KWIKPEN) 100 UNIT/ML pen INJECT 24 UNITS SUBCUTANEOUS AT BEDTIME  Qty: 3 mL, Refills: 0    Associated Diagnoses: Controlled type 2 diabetes mellitus without complication, with long-term current use of insulin (H)      !! insulin glargine (LANTUS SOLOSTAR) 100 UNIT/ML pen Inject 24 Units Subcutaneous At Bedtime  Qty: 45 mL, Refills: 0    Comments: If Lantus is not covered by insurance, may substitute Basaglar at same dose and frequency.    Associated Diagnoses: Controlled type 2 diabetes mellitus without complication, with long-term current use of insulin (H)      insulin lispro (HUMALOG KWIKPEN) 100 UNIT/ML pen Inject 20 Units Subcutaneous 2 times daily (before meals)  Qty: 21 mL, Refills: 3    Associated Diagnoses: Controlled type 2 diabetes mellitus without complication, with long-term  current use of insulin (H)      insulin pen needle (BD MUSA U/F) 32G X 4 MM miscellaneous Use 4 pen needles daily  Qty: 360 each, Refills: 11    Associated Diagnoses: Controlled type 2 diabetes mellitus without complication, with long-term current use of insulin (H)      losartan (COZAAR) 50 MG tablet TAKE 1 TABLET (50 MG) BY MOUTH DAILY  Qty: 90 tablet, Refills: 3    Associated Diagnoses: Essential hypertension with goal blood pressure less than 140/90      metFORMIN (GLUCOPHAGE) 500 MG tablet Take 2 tablets (1,000 mg) by mouth 2 times daily (with meals)  Qty: 360 tablet, Refills: 3    Associated Diagnoses: Controlled type 2 diabetes mellitus without complication (H)      Multiple Vitamins-Minerals (MULTIVITAMIN OR)       sildenafil (REVATIO) 20 MG tablet TAKE 1 TABLET (20 MG) BY MOUTH ONCE AS NEEDED  Qty: 90 tablet, Refills: 3    Comments: DX Code Needed  .  Associated Diagnoses: Drug-induced erectile dysfunction      simvastatin (ZOCOR) 10 MG tablet TAKE 1 TABLET (10 MG) BY MOUTH AT BEDTIME FOR CHOLESTEROL.  Qty: 90 tablet, Refills: 3    Associated Diagnoses: Hyperlipidemia LDL goal <100       !! - Potential duplicate medications found. Please discuss with provider.        Allergies   Allergies   Allergen Reactions     No Known Allergies

## 2019-11-01 NOTE — PLAN OF CARE
Outpatient/Observation goals to be met before discharge home:    PRIMARY DIAGNOSIS: ACS r/o  OUTPATIENT/OBSERVATION GOALS TO BE MET BEFORE DISCHARGE:  Serial troponins and stress test complete: NO-trops neg x 2; stress test to be done in AM.  Seen and cleared by consultant if applicable: NO-none ordered at this time.  Adequate pain control on oral analgesia: YES-pt not reporting any pain thus far this shift; will continue to monitor.  Vital signs normal or at patient baseline: YES-vitals WNL.  Safe disposition plan has been identified: NO-yet to be determined.  *Nurse to notify MD when observation goals have been met and patient is ready for discharge.

## 2019-11-01 NOTE — PLAN OF CARE
"Outpatient/Observation goals to be met before discharge home:    PRIMARY DIAGNOSIS: ACS r/o  OUTPATIENT/OBSERVATION GOALS TO BE MET BEFORE DISCHARGE:  Serial troponins and stress test complete: NO-trops neg, stress test to be done today.  Seen and cleared by consultant if applicable: NO-none ordered at this time.  Adequate pain control on oral analgesia: YES-pt not reporting any pain   Vital signs normal or at patient baseline: YES-vitals WNL.  Safe disposition plan has been identified: NO-yet to be determined.  *Nurse to notify MD when observation goals have been met and patient is ready for discharge.   /80 (BP Location: Right arm)   Pulse 68   Temp 98.3  F (36.8  C) (Oral)   Resp 18   Ht 1.727 m (5' 8\")   SpO2 100%   BMI 27.70 kg/m      "

## 2019-11-01 NOTE — PROGRESS NOTES
DC instructions given to pt , verbalized understanding.  All belongings with pt, IV DC'd and documented. Pt discharged.

## 2019-11-01 NOTE — PROGRESS NOTES
Patient interviewed and examined. Labs, imaging and chart notes reviewed.  Stewart Turcios presented to the ED yesterday with left lateral chest pain that he has been experiencing for several weeks. He notes the pain seems to correlate most with his level of anxiety. He is currently on Wellbutrin. He has been under a fair amount of stress recently. His brother recently  from cardiac cause. He has some financial stressors. His father also recently . He does not note the pain with exertion or breathing. He has no nausea, vomiting or abdominal pain. He has no GERD symptoms unless he eats spicy food such as hot pepper. He has no leg pain or swelling. He has cardiac risk factors including family history, type 2 DM and HTN. He does not smoke. He denies other health issues. He has had no traumatic injuries. DM has been under fair control. Most recent A1c was 8.2. He hopes to get it down further. He has no URI symptoms, cough, sputum, shortness of breath, leg pain or swelling. He does have what sounds like panic attacks at times when he becomes anxious, hyperventilates and has chest tightness. [facility]    Past Medical History:   Diagnosis Date     Benign prostatic hypertrophy with urinary frequency 10/28/2015     Hypertension goal BP (blood pressure) < 140/90 2015     Type II or unspecified type diabetes mellitus without mention of complication, not stated as uncontrolled        Past Surgical History:   Procedure Laterality Date     COLONOSCOPY       COLONOSCOPY WITH CO2 INSUFFLATION N/A 2019    Procedure: COLONOSCOPY, WITH CO2 INSUFFLATION;  Surgeon: Blanca Cuevas DO;  Location:  OR       Family History   Problem Relation Age of Onset     Hypertension Father      Diabetes Sister      Diabetes Brother      Cancer No family hx of      Cerebrovascular Disease No family hx of      Thyroid Disease No family hx of      Glaucoma No family hx of      Macular Degeneration No  "family hx of        Social History     Tobacco Use     Smoking status: Never Smoker     Smokeless tobacco: Never Used   Substance Use Topics     Alcohol use: Yes     Alcohol/week: 0.0 standard drinks     Comment: 3-4 bottles of wine a week     Physical Exam:  Middle aged male in NAD.  /80 (BP Location: Right arm)   Pulse 68   Temp 98.3  F (36.8  C) (Oral)   Resp 18   Ht 1.727 m (5' 8\")   SpO2 100%   BMI 27.70 kg/m    HEENT: PERRLA. EOMI.  Neck: No mass or bruit.  Lungs: Clear to auscultation.  Cardiac: RRR. Normal S1 and S2. 1/6 precordial murmur without radiation. Pulses intact.  Abdomen: Soft, non tender. No splenomegaly.  Extrem: No calf tenderness or edema.  Neuro> CN, speech, motor, coordination intact.  Psych: Normal mood and affect.    Labs/Imaging    Results for orders placed or performed during the hospital encounter of 10/31/19 (from the past 24 hour(s))   EKG 12 lead   Result Value Ref Range    Interpretation ECG Click View Image link to view waveform and result    XR Chest Port 1 View    Narrative    CHEST ONE VIEW PORTABLE  10/31/2019 1:35 PM     HISTORY: Chest pain.    COMPARISON: Chest x-ray on 10/27/2006.      Impression    IMPRESSION: No acute airspace disease.    DONI SEGURA MD   CBC with platelets differential   Result Value Ref Range    WBC 5.9 4.0 - 11.0 10e9/L    RBC Count 5.92 (H) 4.4 - 5.9 10e12/L    Hemoglobin 13.6 13.3 - 17.7 g/dL    Hematocrit 41.9 40.0 - 53.0 %    MCV 71 (L) 78 - 100 fl    MCH 23.0 (L) 26.5 - 33.0 pg    MCHC 32.5 31.5 - 36.5 g/dL    RDW 14.8 10.0 - 15.0 %    Platelet Count 257 150 - 450 10e9/L    Diff Method Automated Method     % Neutrophils 52.5 %    % Lymphocytes 40.0 %    % Monocytes 6.6 %    % Eosinophils 0.5 %    % Basophils 0.2 %    % Immature Granulocytes 0.2 %    Nucleated RBCs 0 0 /100    Absolute Neutrophil 3.1 1.6 - 8.3 10e9/L    Absolute Lymphocytes 2.4 0.8 - 5.3 10e9/L    Absolute Monocytes 0.4 0.0 - 1.3 10e9/L    Absolute Eosinophils 0.0 " 0.0 - 0.7 10e9/L    Absolute Basophils 0.0 0.0 - 0.2 10e9/L    Abs Immature Granulocytes 0.0 0 - 0.4 10e9/L    Absolute Nucleated RBC 0.0    Basic metabolic panel   Result Value Ref Range    Sodium 137 133 - 144 mmol/L    Potassium 3.6 3.4 - 5.3 mmol/L    Chloride 103 94 - 109 mmol/L    Carbon Dioxide 26 20 - 32 mmol/L    Anion Gap 8 3 - 14 mmol/L    Glucose 136 (H) 70 - 99 mg/dL    Urea Nitrogen 9 7 - 30 mg/dL    Creatinine 0.88 0.66 - 1.25 mg/dL    GFR Estimate >90 >60 mL/min/[1.73_m2]    GFR Estimate If Black >90 >60 mL/min/[1.73_m2]    Calcium 9.3 8.5 - 10.1 mg/dL   Troponin I   Result Value Ref Range    Troponin I ES <0.015 0.000 - 0.045 ug/L   INR   Result Value Ref Range    INR 0.93 0.86 - 1.14   Troponin POCT   Result Value Ref Range    Troponin I 0.00 0.00 - 0.08 ug/L   Troponin I   Result Value Ref Range    Troponin I ES <0.015 0.000 - 0.045 ug/L   Glucose by meter   Result Value Ref Range    Glucose 90 70 - 99 mg/dL   Glucose by meter   Result Value Ref Range    Glucose 90 70 - 99 mg/dL   Basic metabolic panel   Result Value Ref Range    Sodium 137 133 - 144 mmol/L    Potassium 4.0 3.4 - 5.3 mmol/L    Chloride 105 94 - 109 mmol/L    Carbon Dioxide 25 20 - 32 mmol/L    Anion Gap 7 3 - 14 mmol/L    Glucose 128 (H) 70 - 99 mg/dL    Urea Nitrogen 10 7 - 30 mg/dL    Creatinine 1.00 0.66 - 1.25 mg/dL    GFR Estimate 84 >60 mL/min/[1.73_m2]    GFR Estimate If Black >90 >60 mL/min/[1.73_m2]    Calcium 8.8 8.5 - 10.1 mg/dL   CBC with platelets differential   Result Value Ref Range    WBC 5.2 4.0 - 11.0 10e9/L    RBC Count 5.60 4.4 - 5.9 10e12/L    Hemoglobin 12.2 (L) 13.3 - 17.7 g/dL    Hematocrit 39.9 (L) 40.0 - 53.0 %    MCV 71 (L) 78 - 100 fl    MCH 21.8 (L) 26.5 - 33.0 pg    MCHC 30.6 (L) 31.5 - 36.5 g/dL    RDW 15.7 (H) 10.0 - 15.0 %    Platelet Count 269 150 - 450 10e9/L    Diff Method Automated Method     % Neutrophils 35.4 %    % Lymphocytes 51.4 %    % Monocytes 9.9 %    % Eosinophils 2.5 %    % Basophils  0.6 %    % Immature Granulocytes 0.2 %    Nucleated RBCs 0 0 /100    Absolute Neutrophil 1.8 1.6 - 8.3 10e9/L    Absolute Lymphocytes 2.7 0.8 - 5.3 10e9/L    Absolute Monocytes 0.5 0.0 - 1.3 10e9/L    Absolute Eosinophils 0.1 0.0 - 0.7 10e9/L    Absolute Basophils 0.0 0.0 - 0.2 10e9/L    Abs Immature Granulocytes 0.0 0 - 0.4 10e9/L    Absolute Nucleated RBC 0.0    Troponin I   Result Value Ref Range    Troponin I ES <0.015 0.000 - 0.045 ug/L   Glucose by meter   Result Value Ref Range    Glucose 166 (H) 70 - 99 mg/dL   Echo Stress Echocardiogram    Narrative    [id number]  [id number]  [id number]  [id number]^ALEKS^MIGUEL^MARLYN           [facility],[place]  Echocardiography Laboratory  [address] [place] [id number]  Name: CARLOS COLLAZO  MRN: [id number]  : 1965  Study Date: 2019 09:19 AM  Age: 53 yrs  Gender: Male  Patient Location: [facility]  Reason For Study: Chest Pain  Ordering Physician: MIGUEL FINK  Performed By: GUERO Armando     BSA: 2.0 m2  Height: 71 in  Weight: 182 lb  HR: 76  BP: 142/80 mmHg  _____________________________________________________________________________  __        Procedure  Stress Echo Bike with two dimensional, color and spectral Doppler performed.  _____________________________________________________________________________  __        Interpretation Summary  This was a normal stress echocardiogram with no evidence of stress-induced  ischemia. Normal resting LV function with EF of approximately 60-65%; normal  response to exercise with increase to approximately 70-75%. No stress induced  regional wall motion abnormalities. No ECG evidence of ischemia. No  significant valvular disease noted on routine screening color flow Doppler and  pulsed Doppler examination. No resting wall motion abnormalities.  Reduced functional capacity.  Exercise was stopped due to fatigue.  The patient did not exhibit any symptoms during exercise.  Normal  blood pressure response with stress.  _____________________________________________________________________________  __     Stress  This was a normal stress EKG with no evidence of stress-induced ischemia.  Peak MVO2 14.5 ml/kg/min .  Percent predicted MVO2 53 %.  RPP 28,224.  Maximum workload 100 mcfadden.  Target Heart Rate was achieved.  Exercise was stopped due to fatigue.  The patient did not exhibit any symptoms during exercise.  Normal blood pressure response with stress.     Rest  Normal baseline electrocardiogram.     Stress Results                                       Maximum Predicted HR:   166 bpm             Target HR: 141 bpm        % Maximum Predicted HR: 87 %                             Stage  DurationHeart Rate  BP                                 (mm:ss)   (bpm)                         Baseline  0:00      76    142/80                           Peak    6:02     144    196/99                            Stress Duration:   6:02 mm:ss *                      Maximum Stress HR: 144 bpm *     Vessels  Aortic size normal.     Contrast  Definity (NDC #97252-659-96) given intravenously. Patient was given 4ml  mixture of 1.5ml Definity and 8.5ml saline. 6 ml wasted. IV start location L  Forearm . Definity Expiration 06/01/2020 . Definity Lot # 6239 .     _____________________________________________________________________________  __  MMode/2D Measurements & Calculations  asc Aorta Diam: 2.9 cm                    _____________________________________________________________________________  __        Report approved by: Mark Crawford 11/01/2019 09:51 AM        Impression:  Middle aged male with multiple cardiac risk factors including significant family history, hypertension, and type 2 DM. He is currently on treatment for blood pressure and DM with fair control. He is on medication for lipids. He has negative EKG, troponin, CXR, serial troponin and exercise stress echo. The likelihood of significant coronary  artery disease is low. He has had past negative stress test and CT coronary angiogram a few years ago as well. We discussed that the likelihood of active symptomatic CAD is low after completing the testing, but that there are false negatives and that he should seek assessment for any changed or worsened symptoms.    His symptoms correlate with anxiety, and he has a number of well defined quite understandable triggers at present. He is on Wellbutrin. We discussed that benzodiazepines are generally regarded with disfavor due to dependence and tolerance issues. We discussed that addition of DBT which can be reasonably tried using one of the self help programs on the internet as a possible next step addition for management as well as increased exercise such as walking.    He should follow up with his primary MD at [facility].    Klever Jessica MD

## 2019-11-15 ENCOUNTER — OFFICE VISIT (OUTPATIENT)
Dept: FAMILY MEDICINE | Facility: CLINIC | Age: 54
End: 2019-11-15
Payer: COMMERCIAL

## 2019-11-15 VITALS
SYSTOLIC BLOOD PRESSURE: 130 MMHG | WEIGHT: 189.25 LBS | RESPIRATION RATE: 16 BRPM | BODY MASS INDEX: 28.68 KG/M2 | DIASTOLIC BLOOD PRESSURE: 74 MMHG | TEMPERATURE: 97.6 F | HEART RATE: 80 BPM | HEIGHT: 68 IN

## 2019-11-15 DIAGNOSIS — Z79.4 CONTROLLED TYPE 2 DIABETES MELLITUS WITHOUT COMPLICATION, WITH LONG-TERM CURRENT USE OF INSULIN (H): ICD-10-CM

## 2019-11-15 DIAGNOSIS — R07.9 CHEST PAIN, UNSPECIFIED TYPE: Primary | ICD-10-CM

## 2019-11-15 DIAGNOSIS — E11.9 CONTROLLED TYPE 2 DIABETES MELLITUS WITHOUT COMPLICATION, WITH LONG-TERM CURRENT USE OF INSULIN (H): ICD-10-CM

## 2019-11-15 DIAGNOSIS — M79.10 MUSCLE PAIN: ICD-10-CM

## 2019-11-15 DIAGNOSIS — Z23 NEED FOR PROPHYLACTIC VACCINATION AND INOCULATION AGAINST INFLUENZA: ICD-10-CM

## 2019-11-15 LAB — HBA1C MFR BLD: 7 % (ref 0–5.6)

## 2019-11-15 PROCEDURE — 83036 HEMOGLOBIN GLYCOSYLATED A1C: CPT | Performed by: FAMILY MEDICINE

## 2019-11-15 PROCEDURE — 90682 RIV4 VACC RECOMBINANT DNA IM: CPT | Performed by: FAMILY MEDICINE

## 2019-11-15 PROCEDURE — 99214 OFFICE O/P EST MOD 30 MIN: CPT | Mod: 25 | Performed by: FAMILY MEDICINE

## 2019-11-15 PROCEDURE — 90471 IMMUNIZATION ADMIN: CPT | Performed by: FAMILY MEDICINE

## 2019-11-15 PROCEDURE — 36415 COLL VENOUS BLD VENIPUNCTURE: CPT | Performed by: FAMILY MEDICINE

## 2019-11-15 RX ORDER — METHOCARBAMOL 500 MG/1
500 TABLET, FILM COATED ORAL 3 TIMES DAILY PRN
Qty: 60 TABLET | Refills: 1 | Status: SHIPPED | OUTPATIENT
Start: 2019-11-15 | End: 2023-03-22

## 2019-11-15 ASSESSMENT — MIFFLIN-ST. JEOR: SCORE: 1672.93

## 2019-11-15 ASSESSMENT — PAIN SCALES - GENERAL: PAINLEVEL: NO PAIN (0)

## 2019-11-15 NOTE — PATIENT INSTRUCTIONS
*   We can try a muscle relaxer for the muscle pain. Robaxin.    *   The A1c, a 3 month average of your blood sugars, is very good: 7.0.    *    Your heart is okay.     *     Quit checking the internet for your symptoms.     *    Follow up in 3 months.

## 2019-11-15 NOTE — PROGRESS NOTES
Subjective     Stewart Turcios is a 53 year old male who presents to clinic today for the following health issues:    [facility]       Hospital Follow-up Visit:    Hospital/Nursing Home/IP Rehab Facility: [facility]  Date of Admission: 10/31/2019  Date of Discharge: 11/1/2019  Reason(s) for Admission: Chest pain            Problems taking medications regularly:  None       Medication changes since discharge: None       Problems adhering to non-medication therapy:  None    Summary of hospitalization:  [facility] discharge summary reviewed  Diagnostic Tests/Treatments reviewed.  Follow up needed: none  Other Healthcare Providers Involved in Patient s Care:         None  Update since discharge: He has had no recurrence of chest pain. He notes that he does have intermittent tension and tight feeling that starts in the back of his neck and then radiates to head and causes a tingling feeling in arms. No headaches. No breathing problems or SOB. He is checking blood sugars 2 times per day and notes that morning readings are averaging in the 200s. No low blood sugars.    Post Discharge Medication Reconciliation: discharge medications reconciled, continue medications without change.  Plan of care communicated with patient     Coding guidelines for this visit:  Type of Medical   Decision Making Face-to-Face Visit       within 7 Days of discharge Face-to-Face Visit        within 14 days of discharge   Moderate Complexity [id number]   High Complexity [id number]     Echo Stress Echocardiogram 11/1/2019  Interpretation Summary  This was a normal stress echocardiogram with no evidence of stress-induced  ischemia. Normal resting LV function with EF of approximately 60-65%; normal  response to exercise with increase to approximately 70-75%. No stress induced  regional wall motion abnormalities. No ECG evidence of ischemia. No  significant valvular disease noted on routine screening color flow Doppler  "and  pulsed Doppler examination. No resting wall motion abnormalities.  Reduced functional capacity.    XR Chest Port 1 View 10/31/2019  IMPRESSION: No acute airspace disease.         Diabetes    Anxiety due to recent death of younger brother. Patient reports brother's autopsy showed block arteries.     Muscle Tightness            Reviewed and updated as needed this visit by Provider         Review of Systems   ROS COMP: CONSTITUTIONAL:NEGATIVE for fever, chills, change in weight  INTEGUMENTARY/SKIN: NEGATIVE for worrisome rashes, moles or lesions  RESP:NEGATIVE for significant cough or SOB  CV: NEGATIVE for chest pain, palpitations or peripheral edema  MUSCULOSKELETAL: POSITIVE for upper back muscle tightness and arthragias  PSYCHIATRIC: POSITIVE for anxiety, stress and grieving loss of younger brother    This document serves as a record of the services and decisions personally performed and made by Anahi Jean MD. It was created on his behalf by Aaron Wong, a trained medical scribe. The creation of this document is based the provider's statements to the medical scribe.  Aaron Wong 10:46 AM November 15, 2019        Objective    /74   Pulse 80   Temp 97.6  F (36.4  C) (Tympanic)   Resp 16   Ht 1.727 m (5' 8\")   Wt 85.8 kg (189 lb 4 oz)   BMI 28.78 kg/m    Body mass index is 28.78 kg/m .  Physical Exam   GENERAL: healthy, alert and no distress  RESP: lungs clear to auscultation - no rales, rhonchi or wheezes  CV: regular rate and rhythm, normal S1 S2  MS: no gross musculoskeletal defects noted, no edema  SKIN: no suspicious lesions or rashes  NEURO: Normal strength and tone, mentation intact and speech normal  PSYCH: mentation appears normal, affect normal/bright    Diagnostic Test Results:  Results for orders placed or performed in visit on 11/15/19   Hemoglobin A1c     Status: Abnormal   Result Value Ref Range    Hemoglobin A1C 7.0 (H) 0 - 5.6 %            Assessment & Plan     (R07.9) Chest " pain, unspecified type  (primary encounter diagnosis)  Comment: Negative stress testing, consider costochondritis.  Plan: Monitor.    (E11.9,  Z79.4) Controlled type 2 diabetes mellitus without complication, with long-term current use of insulin (H)  Comment: Improved.  Plan: Hemoglobin A1c        Continue current medication with slowly decreasing insulin dosage.    (M79.10) Muscle pain  Comment: We will treat.  Plan: methocarbamol (ROBAXIN) 500 MG tablet        Reviewed side effects, especially sedation.    (Z23) Need for prophylactic vaccination and inoculation against influenza  Plan: INFLUENZA QUAD, RECOMBINANT, P-FREE (RIV4)         (FLUBLOCK) [53228], Vaccine Administration,         Initial [60643]           Patient Instructions   *   We can try a muscle relaxer for the muscle pain. Robaxin.    *   The A1c, a 3 month average of your blood sugars, is very good: 7.0.    *    Your heart is okay.     *     Quit checking the internet for your symptoms.     *    Follow up in 3 months.         No follow-ups on file.    The information in this document, created by a scribe for me, accurately reflects the services I personally performed and the decisions made by me. I have reviewed and approved this document for accuracy.     Anahi Jean MD  [facility]    \

## 2020-02-03 DIAGNOSIS — Z79.4 CONTROLLED TYPE 2 DIABETES MELLITUS WITHOUT COMPLICATION, WITH LONG-TERM CURRENT USE OF INSULIN (H): ICD-10-CM

## 2020-02-03 DIAGNOSIS — E11.9 CONTROLLED TYPE 2 DIABETES MELLITUS WITHOUT COMPLICATION, WITH LONG-TERM CURRENT USE OF INSULIN (H): ICD-10-CM

## 2020-02-03 NOTE — TELEPHONE ENCOUNTER
"Requested Prescriptions   Pending Prescriptions Disp Refills     blood glucose (ACCU-CHEK GUIDE) test strip 400 strip 11     Sig: Use to test blood sugar 5 times daily or as directed.  Last Written Prescription Date:  01/10/2019 #400 x 11  Last filled - not provided  Last office visit: 11/15/2019 DAISY Jean   Future Office Visit:  None    Note: requesting a 90 day supply         Diabetic Supplies Protocol Passed - 2/3/2020  5:26 PM        Passed - Medication is active on med list        Passed - Patient is 18 years of age or older        Passed - Recent (6 mo) or future (30 days) visit within the authorizing provider's specialty     Patient had office visit in the last 6 months or has a visit in the next 30 days with authorizing provider.  See \"Patient Info\" tab in inbasket, or \"Choose Columns\" in Meds & Orders section of the refill encounter.              "

## 2020-02-14 ENCOUNTER — OFFICE VISIT (OUTPATIENT)
Dept: FAMILY MEDICINE | Facility: CLINIC | Age: 55
End: 2020-02-14
Payer: COMMERCIAL

## 2020-02-14 VITALS
BODY MASS INDEX: 28.64 KG/M2 | TEMPERATURE: 98.6 F | WEIGHT: 189 LBS | HEIGHT: 68 IN | HEART RATE: 98 BPM | OXYGEN SATURATION: 99 % | SYSTOLIC BLOOD PRESSURE: 154 MMHG | RESPIRATION RATE: 14 BRPM | DIASTOLIC BLOOD PRESSURE: 82 MMHG

## 2020-02-14 DIAGNOSIS — Z79.4 CONTROLLED TYPE 2 DIABETES MELLITUS WITHOUT COMPLICATION, WITH LONG-TERM CURRENT USE OF INSULIN (H): Primary | ICD-10-CM

## 2020-02-14 DIAGNOSIS — E11.9 CONTROLLED TYPE 2 DIABETES MELLITUS WITHOUT COMPLICATION, WITH LONG-TERM CURRENT USE OF INSULIN (H): Primary | ICD-10-CM

## 2020-02-14 DIAGNOSIS — J20.9 ACUTE BRONCHITIS, UNSPECIFIED ORGANISM: ICD-10-CM

## 2020-02-14 DIAGNOSIS — Z12.5 SCREENING FOR PROSTATE CANCER: ICD-10-CM

## 2020-02-14 LAB
HBA1C MFR BLD: 8.1 % (ref 0–5.6)
PSA SERPL-ACNC: 0.19 UG/L (ref 0–4)

## 2020-02-14 PROCEDURE — G0103 PSA SCREENING: HCPCS | Performed by: FAMILY MEDICINE

## 2020-02-14 PROCEDURE — 99214 OFFICE O/P EST MOD 30 MIN: CPT | Performed by: FAMILY MEDICINE

## 2020-02-14 PROCEDURE — 36415 COLL VENOUS BLD VENIPUNCTURE: CPT | Performed by: FAMILY MEDICINE

## 2020-02-14 PROCEDURE — 83036 HEMOGLOBIN GLYCOSYLATED A1C: CPT | Performed by: FAMILY MEDICINE

## 2020-02-14 RX ORDER — AZITHROMYCIN 250 MG/1
TABLET, FILM COATED ORAL
Qty: 6 TABLET | Refills: 0 | Status: SHIPPED | OUTPATIENT
Start: 2020-02-14 | End: 2020-07-05

## 2020-02-14 ASSESSMENT — MIFFLIN-ST. JEOR: SCORE: 1666.8

## 2020-02-14 NOTE — PATIENT INSTRUCTIONS
*    Will treat with a Z frieda for the cough.     *    Add another medication for diabetes: Trulicity let me know how this works.     *    The A1c, a 3 month average of your blood sugars, is up 8.1.    *    Come back in 3 months for an A1c. Lab test.

## 2020-02-14 NOTE — PROGRESS NOTES
Subjective     Stewart Turcios is a 54 year old male who presents to clinic today for the following health issues:    HPI   Diabetes Follow-up    How often are you checking your blood sugar? Two times daily  What time of day are you checking your blood sugars (select all that apply)?  Before breakfast and before dinner  Have you had any blood sugars above 200?  Yes 220-280 in morning  Have you had any blood sugars below 70?  Yes 46 during day    What symptoms do you notice when your blood sugar is low?  Shaky, Dizzy and Weak    What concerns do you have today about your diabetes? Blood sugar is often over 200 in the morning     Do you have any of these symptoms? (Select all that apply)  No numbness or tingling in feet.  No redness, sores or blisters on feet.  No complaints of excessive thirst.  No reports of blurry vision.  No significant changes to weight.      Will check blood sugar after work, reading is 115, will eat and will check blood sugar just before bed, readings in 200's take long acting and 5 units of regular. Trying to find a balance of insulin before bed because afraid of low blood sugar.    Hyperlipidemia Follow-Up      Are you regularly taking any medication or supplement to lower your cholesterol?   Yes- statin    Are you having muscle aches or other side effects that you think could be caused by your cholesterol lowering medication?  Yes- can't tell    Hypertension Follow-up      Do you check your blood pressure regularly outside of the clinic? No     Are you following a low salt diet? No    Are your blood pressures ever more than 140 on the top number (systolic) OR more   than 90 on the bottom number (diastolic), for example 140/90? No    BP Readings from Last 2 Encounters:   02/14/20 (!) 154/82   11/15/19 130/74     Hemoglobin A1C (%)   Date Value   02/14/2020 8.1 (H)   11/15/2019 7.0 (H)     LDL Cholesterol Calculated (mg/dL)   Date Value   07/19/2019 78   07/23/2018 70         How many  "servings of fruits and vegetables do you eat daily?  0-1    On average, how many sweetened beverages do you drink each day (Examples: soda, juice, sweet tea, etc.  Do NOT count diet or artificially sweetened beverages)?   0-1    How many days per week do you exercise enough to make your heart beat faster? 3 or less    How many minutes a day do you exercise enough to make your heart beat faster? 30 - 60    How many days per week do you miss taking your medication? 0    Patient has an intermittent cough that he would like addressed today also. Non productive. He states a Z-pack always helps.     Nikki Wilson CMA       Reviewed and updated as needed this visit by Provider         Review of Systems   ROS COMP: CONSTITUTIONAL:NEGATIVE for fever, chills, change in weight  INTEGUMENTARY/SKIN: NEGATIVE for worrisome rashes, moles or lesions  EYES: NEGATIVE for vision changes or irritation  ENT/MOUTH: NEGATIVE for ear, mouth and throat problems  RESP:POSITIVE for cough-non productive  CV: NEGATIVE for chest pain, palpitations or peripheral edema  MUSCULOSKELETAL: NEGATIVE for significant arthralgias or myalgia  NEURO: NEGATIVE for weakness, dizziness or paresthesias  PSYCHIATRIC: NEGATIVE for changes in mood or affect    This document serves as a record of the services and decisions personally performed and made by Anahi Jean MD. It was created on his behalf by Aaron Wong, a trained medical scribe. The creation of this document is based the provider's statements to the medical scribe.  Aaron Wong 3:33 PM February 14, 2020        Objective    BP (!) 154/82 (BP Location: Right arm, Patient Position: Sitting, Cuff Size: Adult Regular)   Pulse 98   Temp 98.6  F (37  C) (Tympanic)   Resp 14   Ht 1.727 m (5' 8\")   Wt 85.7 kg (189 lb)   SpO2 99%   BMI 28.74 kg/m    Body mass index is 28.74 kg/m .  Physical Exam   GENERAL: alert, pleasant and no distress  EYES: Eyes grossly normal to inspection, conjunctivae and " sclerae normal  HENT: ear canals and TM's normal, nose and mouth without ulcers or lesions  NECK: no adenopathy, no asymmetry, masses, or scars and thyroid normal to palpation  RESP: lungs clear to auscultation - no rales, rhonchi or wheezes  CV: regular rate and rhythm, normal S1 S2  ABDOMEN: soft, nontender  MS: no gross musculoskeletal defects noted, no edema  SKIN: no suspicious lesions or rashes  NEURO: mentation intact and speech normal  PSYCH: mentation appears normal, affect normal/bright    Diagnostic Test Results:    Results for orders placed or performed in visit on 02/14/20   Hemoglobin A1c     Status: Abnormal   Result Value Ref Range    Hemoglobin A1C 8.1 (H) 0 - 5.6 %   PSA, screen     Status: None   Result Value Ref Range    PSA 0.19 0 - 4 ug/L           Assessment & Plan     (E11.9,  Z79.4) Controlled type 2 diabetes mellitus without complication, with long-term current use of insulin (H)  (primary encounter diagnosis)  Comment: A1c elevated, 8.1 since last visit.  Now uncontrolled, will add GLP-1 agonist.  Plan: Hemoglobin A1c, dulaglutide (TRULICITY) 0.75         MG/0.5ML pen, Hemoglobin A1c        Continue other medications including insulin.  Reviewed side effects, especially nausea.  Follow-up in 3 months.    (J20.9) Acute bronchitis, unspecified organism  Comment: Given duration of symptoms, will treat.  Plan: azithromycin (ZITHROMAX) 250 MG tablet      (Z12.5) Screening for prostate cancer  Comment: Preventative screening. PSA 0.19.   Plan: PSA, screen        Reassuring.  Repeat yearly until age 75.    Patient Instructions   *    Will treat with a Z frieda for the cough.     *    Add another medication for diabetes: Trulicity let me know how this works.     *    The A1c, a 3 month average of your blood sugars, is up 8.1.    *    Come back in 3 months for an A1c. Lab test.        BMI:   Estimated body mass index is 28.74 kg/m  as calculated from the following:    Height as of this encounter:  "1.727 m (5' 8\").    Weight as of this encounter: 85.7 kg (189 lb).   Weight management plan: Discussed healthy diet and exercise guidelines            Return in about 3 months (around 5/14/2020) for Lab Work a1c.    The information in this document, created by a scribe for me, accurately reflects the services I personally performed and the decisions made by me. I have reviewed and approved this document for accuracy.     Anahi Jean MD  [facility]      "

## 2020-02-14 NOTE — LETTER
"March 4, 2020      Stewart Turcios  [address] [facility]  [place] [address]        Dear ,    We are writing to inform you of your test results.    Enclosed you will find a copy of your recent test results.  The PSA blood test, checking for prostate cancer, is very low.  This is reassuring that you do not have prostate cancer.  As we talked about, your hemoglobin A1c, three-month average of your blood sugar readings, is up.  In 3 months, you can call to set up a \"lab only\" appointment and recheck your A1c.  Otherwise, please call with any questions or concerns.     Resulted Orders   Hemoglobin A1c   Result Value Ref Range    Hemoglobin A1C 8.1 (H) 0 - 5.6 %      Comment:      Normal <5.7% Prediabetes 5.7-6.4%  Diabetes 6.5% or higher - adopted from ADA   consensus guidelines.     PSA, screen   Result Value Ref Range    PSA 0.19 0 - 4 ug/L      Comment:      Assay Method:  Chemiluminescence using Siemens Vista analyzer       If you have any questions or concerns, please call the clinic at the number listed above.       Sincerely,    Anahi Jean MD        "

## 2020-02-29 ENCOUNTER — TELEPHONE (OUTPATIENT)
Dept: FAMILY MEDICINE | Facility: CLINIC | Age: 55
End: 2020-02-29

## 2020-03-02 NOTE — TELEPHONE ENCOUNTER
Called and spoke with patient   The [place] DMV states they do not have the form Dr Jean filled out , at appt 2/14/2020   Pt states he did take it to them, he was not sure it he kept a copy, review of chart I could not find any form for driving in chart or media, advised pt we usually do not place them inchart as they are not a medical form, he needs to be responsible and keep a copy for him self, as we were speaking he did FIND the copy, and will take that to DMV now,   Advised if need new form or change make needs to make an appt with Dr Jean to get readdressed   No further action needed at this time on our part   TARA Diaz  RN/Malcolm Ledesma

## 2020-03-11 DIAGNOSIS — Z79.4 CONTROLLED TYPE 2 DIABETES MELLITUS WITHOUT COMPLICATION, WITH LONG-TERM CURRENT USE OF INSULIN (H): ICD-10-CM

## 2020-03-11 DIAGNOSIS — E11.9 CONTROLLED TYPE 2 DIABETES MELLITUS WITHOUT COMPLICATION, WITH LONG-TERM CURRENT USE OF INSULIN (H): ICD-10-CM

## 2020-03-12 NOTE — TELEPHONE ENCOUNTER
"Requested Prescriptions   Pending Prescriptions Disp Refills     buPROPion (WELLBUTRIN XL) 300 MG 24 hr tablet [Pharmacy Med Name: BUPROPION HCL  MG TABLET] 90 tablet 1     Sig: TAKE 1 TABLET BY MOUTH DAILY IN THE MORNING  Last Written Prescription Date:  08/07/2019 #90 x 1  Last filled 12/18/2019  Last office visit: 2/14/2020 NELIDA Jean   Future Office Visit:  None         SSRIs Protocol Passed - 3/11/2020  3:39 AM  PHQ 1/11/2019 2/11/2019   PHQ-9 Total Score 7 4   Q9: Thoughts of better off dead/self-harm past 2 weeks Not at all Not at all       ARTUR-7 SCORE 1/11/2019 2/11/2019   Total Score 3 3           Passed - Recent (12 mo) or future (30 days) visit within the authorizing provider's specialty     Patient has had an office visit with the authorizing provider or a provider within the authorizing providers department within the previous 12 mos or has a future within next 30 days. See \"Patient Info\" tab in inbasket, or \"Choose Columns\" in Meds & Orders section of the refill encounter.              Passed - Medication is Bupropion     If the medication is Bupropion (Wellbutrin), and the patient is taking for smoking cessation; OK to refill.          Passed - Medication is active on med list        Passed - Patient is age 18 or older           glipiZIDE (GLUCOTROL XL) 10 MG 24 hr tablet [Pharmacy Med Name: GLIPIZIDE ER 10 MG TABLET] 90 tablet 1     Sig: TAKE 1 TABLET BY MOUTH EVERY DAY  Last Written Prescription Date:  08/07/2019 #90 x 1  Last filled 12/18/2019  Last office visit: 2/14/2020 NELIDA Jean   Future Office Visit:  None         Sulfonylurea Agents Failed - 3/11/2020  3:39 AM        Failed - Blood pressure less than 140/90 in past 6 months     BP Readings from Last 3 Encounters:   02/14/20 (!) 154/82   11/15/19 130/74   11/01/19 129/83                 Passed - Patient has documented LDL within the past 12 mos.     Recent Labs   Lab Test 07/19/19  1505   LDL 78             Passed - Patient has had a " "Microalbumin in the past 15 mos.     Recent Labs   Lab Test 07/19/19  1500  04/20/15   MICROALB  --   --  <5.0   MICROL <5   < >  --    UMALCR Unable to calculate due to low value   < > --    < > = values in this interval not displayed.             Passed - Patient has documented A1c within the specified period of time.     If HgbA1C is 8 or greater, it needs to be on file within the past 3 months.  If less than 8, must be on file within the past 6 months.     Recent Labs   Lab Test 02/14/20  1518   A1C 8.1*             Passed - Medication is active on med list        Passed - Patient is age 18 or older        Passed - Patient has a recent creatinine (normal) within the past 12 mos.     Recent Labs   Lab Test 11/01/19  0618   CR 1.00             Passed - Recent (6 mo) or future (30 days) visit within the authorizing provider's specialty     Patient had office visit in the last 6 months or has a visit in the next 30 days with authorizing provider or within the authorizing provider's specialty.  See \"Patient Info\" tab in inbasket, or \"Choose Columns\" in Meds & Orders section of the refill encounter.                 "

## 2020-03-14 DIAGNOSIS — E11.9 CONTROLLED TYPE 2 DIABETES MELLITUS WITHOUT COMPLICATION (H): ICD-10-CM

## 2020-03-14 RX ORDER — BUPROPION HYDROCHLORIDE 300 MG/1
TABLET ORAL
Qty: 90 TABLET | Refills: 1 | Status: SHIPPED | OUTPATIENT
Start: 2020-03-14 | End: 2022-08-24

## 2020-03-14 RX ORDER — GLIPIZIDE 10 MG/1
TABLET, FILM COATED, EXTENDED RELEASE ORAL
Qty: 90 TABLET | Refills: 1 | Status: SHIPPED | OUTPATIENT
Start: 2020-03-14 | End: 2020-12-29

## 2020-03-16 NOTE — TELEPHONE ENCOUNTER
Lab Results   Component Value Date    A1C 8.1 02/14/2020    A1C 7.0 11/15/2019    A1C 8.1 07/19/2019    A1C 8.1 01/11/2019    A1C 8.3 11/07/2018       Prescription approved per [facility] Refill Protocol or patient Primary care provider (PCP) Chart and last OV reviewed   TARA Diaz RN/Malcolm Ledesma

## 2020-04-11 DIAGNOSIS — Z79.4 CONTROLLED TYPE 2 DIABETES MELLITUS WITHOUT COMPLICATION, WITH LONG-TERM CURRENT USE OF INSULIN (H): ICD-10-CM

## 2020-04-11 DIAGNOSIS — E11.9 CONTROLLED TYPE 2 DIABETES MELLITUS WITHOUT COMPLICATION, WITH LONG-TERM CURRENT USE OF INSULIN (H): ICD-10-CM

## 2020-04-13 RX ORDER — INSULIN LISPRO 100 [IU]/ML
INJECTION, SOLUTION INTRAVENOUS; SUBCUTANEOUS
Qty: 21 ML | Refills: 3 | Status: SHIPPED | OUTPATIENT
Start: 2020-04-13 | End: 2021-02-20

## 2020-04-13 NOTE — TELEPHONE ENCOUNTER
Prescription approved per [facility] Refill Protocol.  Chayito Peralta RN

## 2020-06-11 DIAGNOSIS — I10 ESSENTIAL HYPERTENSION WITH GOAL BLOOD PRESSURE LESS THAN 140/90: ICD-10-CM

## 2020-06-11 RX ORDER — LOSARTAN POTASSIUM 50 MG/1
TABLET ORAL
Qty: 90 TABLET | Refills: 1 | Status: SHIPPED | OUTPATIENT
Start: 2020-06-11 | End: 2020-06-24

## 2020-06-11 NOTE — TELEPHONE ENCOUNTER
Potassium   Date Value Ref Range Status   11/01/2019 4.0 3.4 - 5.3 mmol/L Final     BP Readings from Last 3 Encounters:   02/14/20 (!) 154/82   11/15/19 130/74   11/01/19 129/83       Spoke with patient , he is still having high BG in AM but does not eat during day and then has a big meal in late evening, discussed trying to balance his intake better,   Also discussed getting a BP cuff to monitor BP from time to time     Advised virtual visit , PV made for this month   Medication is being filled for 1 time refill only due to:   Over due for office visit and/or labs   TARA Diaz RN/Malcolm Ledesma

## 2020-06-15 ENCOUNTER — TELEPHONE (OUTPATIENT)
Dept: FAMILY MEDICINE | Facility: CLINIC | Age: 55
End: 2020-06-15

## 2020-06-15 DIAGNOSIS — E78.5 HYPERLIPIDEMIA LDL GOAL <100: ICD-10-CM

## 2020-06-15 DIAGNOSIS — I10 ESSENTIAL HYPERTENSION WITH GOAL BLOOD PRESSURE LESS THAN 140/90: Primary | ICD-10-CM

## 2020-06-15 NOTE — TELEPHONE ENCOUNTER
Pt is calling and statesthat he has an appt for dr mack next week and wants to get his labs done prior I looked and see a order for a A1C already but he states he needs his kidney labs checked, please review and advise.     Lela Cardoza, [place]

## 2020-06-22 DIAGNOSIS — I10 ESSENTIAL HYPERTENSION WITH GOAL BLOOD PRESSURE LESS THAN 140/90: ICD-10-CM

## 2020-06-22 DIAGNOSIS — E11.9 CONTROLLED TYPE 2 DIABETES MELLITUS WITHOUT COMPLICATION, WITH LONG-TERM CURRENT USE OF INSULIN (H): ICD-10-CM

## 2020-06-22 DIAGNOSIS — E78.5 HYPERLIPIDEMIA LDL GOAL <100: ICD-10-CM

## 2020-06-22 DIAGNOSIS — Z79.4 CONTROLLED TYPE 2 DIABETES MELLITUS WITHOUT COMPLICATION, WITH LONG-TERM CURRENT USE OF INSULIN (H): ICD-10-CM

## 2020-06-22 LAB
ANION GAP SERPL CALCULATED.3IONS-SCNC: 7 MMOL/L (ref 3–14)
BUN SERPL-MCNC: 11 MG/DL (ref 7–30)
CALCIUM SERPL-MCNC: 9.2 MG/DL (ref 8.5–10.1)
CHLORIDE SERPL-SCNC: 107 MMOL/L (ref 94–109)
CHOLEST SERPL-MCNC: 143 MG/DL
CO2 SERPL-SCNC: 25 MMOL/L (ref 20–32)
CREAT SERPL-MCNC: 0.89 MG/DL (ref 0.66–1.25)
CREAT UR-MCNC: 103 MG/DL
GFR SERPL CREATININE-BSD FRML MDRD: >90 ML/MIN/{1.73_M2}
GLUCOSE SERPL-MCNC: 211 MG/DL (ref 70–99)
HBA1C MFR BLD: 7.7 % (ref 0–5.6)
HDLC SERPL-MCNC: 35 MG/DL
LDLC SERPL CALC-MCNC: 47 MG/DL
MICROALBUMIN UR-MCNC: 9 MG/L
MICROALBUMIN/CREAT UR: 8.95 MG/G CR (ref 0–17)
NONHDLC SERPL-MCNC: 108 MG/DL
POTASSIUM SERPL-SCNC: 4.7 MMOL/L (ref 3.4–5.3)
SODIUM SERPL-SCNC: 139 MMOL/L (ref 133–144)
TRIGL SERPL-MCNC: 307 MG/DL

## 2020-06-22 PROCEDURE — 80061 LIPID PANEL: CPT | Performed by: FAMILY MEDICINE

## 2020-06-22 PROCEDURE — 36415 COLL VENOUS BLD VENIPUNCTURE: CPT | Performed by: FAMILY MEDICINE

## 2020-06-22 PROCEDURE — 80048 BASIC METABOLIC PNL TOTAL CA: CPT | Performed by: FAMILY MEDICINE

## 2020-06-22 PROCEDURE — 82043 UR ALBUMIN QUANTITATIVE: CPT | Performed by: FAMILY MEDICINE

## 2020-06-22 PROCEDURE — 83036 HEMOGLOBIN GLYCOSYLATED A1C: CPT | Performed by: FAMILY MEDICINE

## 2020-06-24 ENCOUNTER — VIRTUAL VISIT (OUTPATIENT)
Dept: FAMILY MEDICINE | Facility: CLINIC | Age: 55
End: 2020-06-24
Payer: COMMERCIAL

## 2020-06-24 DIAGNOSIS — J20.9 ACUTE BRONCHITIS, UNSPECIFIED ORGANISM: ICD-10-CM

## 2020-06-24 DIAGNOSIS — E78.5 HYPERLIPIDEMIA WITH TARGET LDL LESS THAN 100: ICD-10-CM

## 2020-06-24 DIAGNOSIS — I10 HYPERTENSION GOAL BP (BLOOD PRESSURE) < 140/90: ICD-10-CM

## 2020-06-24 DIAGNOSIS — E11.9 CONTROLLED TYPE 2 DIABETES MELLITUS WITHOUT COMPLICATION, WITH LONG-TERM CURRENT USE OF INSULIN (H): Primary | ICD-10-CM

## 2020-06-24 DIAGNOSIS — Z79.4 CONTROLLED TYPE 2 DIABETES MELLITUS WITHOUT COMPLICATION, WITH LONG-TERM CURRENT USE OF INSULIN (H): Primary | ICD-10-CM

## 2020-06-24 DIAGNOSIS — N52.9 ERECTILE DYSFUNCTION, UNSPECIFIED ERECTILE DYSFUNCTION TYPE: ICD-10-CM

## 2020-06-24 PROCEDURE — 99214 OFFICE O/P EST MOD 30 MIN: CPT | Mod: TEL | Performed by: FAMILY MEDICINE

## 2020-06-24 RX ORDER — DULAGLUTIDE 1.5 MG/.5ML
1.5 INJECTION, SOLUTION SUBCUTANEOUS
Qty: 6.5 ML | Refills: 1 | Status: SHIPPED | OUTPATIENT
Start: 2020-06-24 | End: 2021-03-10

## 2020-06-24 RX ORDER — LOSARTAN POTASSIUM 100 MG/1
100 TABLET ORAL DAILY
Qty: 90 TABLET | Refills: 1 | Status: SHIPPED | OUTPATIENT
Start: 2020-06-24 | End: 2021-01-19

## 2020-06-24 RX ORDER — AZITHROMYCIN 250 MG/1
TABLET, FILM COATED ORAL
Qty: 6 TABLET | Refills: 0 | Status: SHIPPED | OUTPATIENT
Start: 2020-06-24 | End: 2020-08-28

## 2020-06-24 ASSESSMENT — ANXIETY QUESTIONNAIRES
GAD7 TOTAL SCORE: 6
IF YOU CHECKED OFF ANY PROBLEMS ON THIS QUESTIONNAIRE, HOW DIFFICULT HAVE THESE PROBLEMS MADE IT FOR YOU TO DO YOUR WORK, TAKE CARE OF THINGS AT HOME, OR GET ALONG WITH OTHER PEOPLE: NOT DIFFICULT AT ALL
7. FEELING AFRAID AS IF SOMETHING AWFUL MIGHT HAPPEN: NOT AT ALL
3. WORRYING TOO MUCH ABOUT DIFFERENT THINGS: SEVERAL DAYS
1. FEELING NERVOUS, ANXIOUS, OR ON EDGE: SEVERAL DAYS
5. BEING SO RESTLESS THAT IT IS HARD TO SIT STILL: SEVERAL DAYS
6. BECOMING EASILY ANNOYED OR IRRITABLE: NOT AT ALL
2. NOT BEING ABLE TO STOP OR CONTROL WORRYING: NOT AT ALL

## 2020-06-24 ASSESSMENT — PATIENT HEALTH QUESTIONNAIRE - PHQ9
5. POOR APPETITE OR OVEREATING: NEARLY EVERY DAY
SUM OF ALL RESPONSES TO PHQ QUESTIONS 1-9: 3

## 2020-06-24 NOTE — PROGRESS NOTES
"Stewart Turcios is a 54 year old male who is being evaluated via a billable telephone visit.      The patient has been notified of following:     \"This telephone visit will be conducted via a call between you and your physician/provider. We have found that certain health care needs can be provided without the need for a physical exam.  This service lets us provide the care you need with a short phone conversation.  If a prescription is necessary we can send it directly to your pharmacy.  If lab work is needed we can place an order for that and you can then stop by our lab to have the test done at a later time.    Telephone visits are billed at different rates depending on your insurance coverage. During this emergency period, for some insurers they may be billed the same as an in-person visit.  Please reach out to your insurance provider with any questions.    If during the course of the call the physician/provider feels a telephone visit is not appropriate, you will not be charged for this service.\"    Patient has given verbal consent for Telephone visit?  Yes    What phone number would you like to be contacted at? [phone]    How would you like to obtain your AVS? Mail a copy    Subjective     Stewart Turcios is a 54 year old male who presents via phone visit today for the following health issues:    HPI  Anxiety Follow-Up    How are you doing with your anxiety since your last visit? No change    Are you having other symptoms that might be associated with anxiety? No    Have you had a significant life event? Grief or Loss, loss job     Are you feeling depressed? No    Do you have any concerns with your use of alcohol or other drugs? Yes:  alcohol    Social History     Tobacco Use     Smoking status: Never Smoker     Smokeless tobacco: Never Used   Substance Use Topics     Alcohol use: Yes     Alcohol/week: 0.0 standard drinks     Comment: 3-4 bottles of wine a week     Drug use: No     ARTUR-7 SCORE " 1/11/2019 2/11/2019 6/24/2020   Total Score 3 3 6     PHQ 1/11/2019 2/11/2019 6/24/2020   PHQ-9 Total Score 7 4 3   Q9: Thoughts of better off dead/self-harm past 2 weeks Not at all Not at all Not at all     Last PHQ-9 6/24/2020   1.  Little interest or pleasure in doing things 0   2.  Feeling down, depressed, or hopeless 0   3.  Trouble falling or staying asleep, or sleeping too much 1   4.  Feeling tired or having little energy 1   5.  Poor appetite or overeating 0   6.  Feeling bad about yourself 0   7.  Trouble concentrating 1   8.  Moving slowly or restless 0   Q9: Thoughts of better off dead/self-harm past 2 weeks 0   PHQ-9 Total Score 3   Difficulty at work, home, or with people Not difficult at all     ARTUR-7  6/24/2020   1. Feeling nervous, anxious, or on edge 1   2. Not being able to stop or control worrying 0   3. Worrying too much about different things 1   4. Trouble relaxing 3   5. Being so restless that it is hard to sit still 1   6. Becoming easily annoyed or irritable 0   7. Feeling afraid, as if something awful might happen 0   ARTUR-7 Total Score 6   If you checked any problems, how difficult have they made it for you to do your work, take care of things at home, or get along with other people? Not difficult at all           How many days per week do you miss taking your medication? 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 CONTAGIOUS     Erectile dysfunction     Hyperlipidemia with target LDL less than 100     Benign prostatic hypertrophy with urinary frequency     Vitamin D deficiency     Hypertension goal BP (blood pressure) < 140/90     Controlled type 2 diabetes mellitus without complication, with long-term current use of insulin (H)     Past Surgical History:   Procedure Laterality Date     COLONOSCOPY       COLONOSCOPY WITH CO2 INSUFFLATION N/A 8/23/2019    Procedure: COLONOSCOPY, WITH CO2 INSUFFLATION;  Surgeon:  Blanca Cuevas, ;  Location:  OR       Social History     Tobacco Use     Smoking status: Never Smoker     Smokeless tobacco: Never Used   Substance Use Topics     Alcohol use: Yes     Alcohol/week: 0.0 standard drinks     Comment: 3-4 bottles of wine a week     Family History   Problem Relation Age of Onset     Hypertension Father      Diabetes Sister      Diabetes Brother      Diabetes Brother          age 50      Cancer No family hx of      Cerebrovascular Disease No family hx of      Thyroid Disease No family hx of      Glaucoma No family hx of      Macular Degeneration No family hx of          Current Outpatient Medications   Medication Sig Dispense Refill     ASPIRIN 81 MG OR TABS 1 tab po QD (Once per day) 100 3     azithromycin (ZITHROMAX) 250 MG tablet Two tablets first day, then one tablet daily for four days. 6 tablet 0     blood glucose (ACCU-CHEK GUIDE) test strip Use to test blood sugar 5 times daily or as directed. 500 strip 3     blood glucose monitoring (ACCU-CHEK FASTCLIX) lancets Use to test blood sugar 5 times daily or as directed.  Ok to substitute alternative if insurance prefers. 102 each 11     buPROPion (WELLBUTRIN XL) 300 MG 24 hr tablet TAKE 1 TABLET BY MOUTH DAILY IN THE MORNING 90 tablet 1     Cholecalciferol (VITAMIN D3 PO) Take 2,000 Units by mouth daily       dulaglutide (TRULICITY) 1.5 MG/0.5ML pen Inject 1.5 mg Subcutaneous every 7 days For diabetes 6.5 mL 1     glipiZIDE (GLUCOTROL XL) 10 MG 24 hr tablet TAKE 1 TABLET BY MOUTH EVERY DAY 90 tablet 1     HUMALOG KWIKPEN 100 UNIT/ML soln INJECT 20 UNITS SUBCUTANEOUS 2 TIMES DAILY (BEFORE MEALS) 21 mL 3     insulin glargine (LANTUS SOLOSTAR) 100 UNIT/ML pen Inject 24 Units Subcutaneous At Bedtime 45 mL 0     insulin pen needle (BD MUSA U/F) 32G X 4 MM miscellaneous USE 4 PEN NEEDLES DAILY 400 each 11     LORazepam (ATIVAN) 0.5 MG tablet Take 1 tablet (0.5 mg) by mouth every 6 hours as needed for anxiety 10 tablet 0      losartan (COZAAR) 100 MG tablet Take 1 tablet (100 mg) by mouth daily For blood pressure. 90 tablet 1     metFORMIN (GLUCOPHAGE) 500 MG tablet TAKE 2 TABLETS (1,000 MG) BY MOUTH 2 TIMES DAILY (WITH MEALS) 360 tablet 3     methocarbamol (ROBAXIN) 500 MG tablet Take 1 tablet (500 mg) by mouth 3 times daily as needed for muscle spasms 60 tablet 1     Multiple Vitamins-Minerals (MULTIVITAMIN OR)        sildenafil (REVATIO) 20 MG tablet TAKE 1 TABLET (20 MG) BY MOUTH ONCE AS NEEDED 90 tablet 3     simvastatin (ZOCOR) 10 MG tablet TAKE 1 TABLET (10 MG) BY MOUTH AT BEDTIME FOR CHOLESTEROL. 90 tablet 3       Reviewed and updated as needed this visit by Provider         Review of Systems   CONSTITUTIONAL: NEGATIVE for fever, chills, change in weight  ENT/MOUTH: NEGATIVE for ear, mouth and throat problems  RESP: notes cough.   CV: NEGATIVE for chest pain, palpitations or peripheral edema       Objective   Reported vitals:  There were no vitals taken for this visit.   healthy, alert and no distress  PSYCH: Alert.  His affect is normal  RESP: No cough, no audible wheezing, able to talk in full sentences  Remainder of exam unable to be completed due to telephone visits    Diagnostic Test Results:  Labs reviewed in Epic  No results found for any visits on 06/24/20.        Assessment/Plan:  (E11.9,  Z79.4) Controlled type 2 diabetes mellitus without complication, with long-term current use of insulin (H)  (primary encounter diagnosis)  Comment: Within guidelines using GLP-1 agonist, glipizide, short and long-acting insulin.  He notes morning sugars being significantly high.  Lab Results   Component Value Date    A1C 7.7 06/22/2020    A1C 8.1 02/14/2020    A1C 7.0 11/15/2019    A1C 8.1 07/19/2019    A1C 8.1 01/11/2019        Plan: dulaglutide (TRULICITY) 1.5 MG/0.5ML pen        Continue current medications, advised that he cautiously increase his short acting insulin in the evening to decrease elevated blood sugars.  Reviewed that  he needs to be very cautious with this to avoid glycemic episodes at night    (I10) Hypertension goal BP (blood pressure) < 140/90  Comment: Patient reports home readings within guidelines.  Plan: losartan (COZAAR) 100 MG tablet        Continue ARB therapy.    (J20.9) Acute bronchitis, unspecified organism  Comment: Given duration of symptoms and his history of diabetes, will treat.  Plan: azithromycin (ZITHROMAX) 250 MG tablet       (E78.5) Hyperlipidemia with target LDL less than 100  Comment: Tolerating moderate intensity statin therapy well.  LDL 47.  Plan: Continue.  1 year refill.    (N52.9) Erectile dysfunction, unspecified erectile dysfunction type  Comment: No contraindications.  Plan: Continue as needed      Return in about 6 months (around 12/24/2020) for diabetes check.      Phone call duration:  16 minutes    Anahi Jean MD

## 2020-06-25 ASSESSMENT — ANXIETY QUESTIONNAIRES: GAD7 TOTAL SCORE: 6

## 2020-06-28 DIAGNOSIS — N52.2 DRUG-INDUCED ERECTILE DYSFUNCTION: ICD-10-CM

## 2020-06-29 NOTE — TELEPHONE ENCOUNTER
Routing refill request to provider for review/approval because:  PCP to decide, request quantity 90  Chayito Peralta RN

## 2020-06-30 RX ORDER — SILDENAFIL CITRATE 20 MG/1
20 TABLET ORAL
Qty: 90 TABLET | Refills: 3 | Status: SHIPPED | OUTPATIENT
Start: 2020-06-30 | End: 2024-01-19

## 2020-08-25 DIAGNOSIS — E78.5 HYPERLIPIDEMIA LDL GOAL <100: ICD-10-CM

## 2020-08-26 DIAGNOSIS — J20.9 ACUTE BRONCHITIS, UNSPECIFIED ORGANISM: ICD-10-CM

## 2020-08-27 RX ORDER — SIMVASTATIN 10 MG
10 TABLET ORAL AT BEDTIME
Qty: 90 TABLET | Refills: 2 | Status: SHIPPED | OUTPATIENT
Start: 2020-08-27 | End: 2021-07-15

## 2020-08-27 NOTE — TELEPHONE ENCOUNTER
LDL Cholesterol Calculated   Date Value Ref Range Status   06/22/2020 47 <100 mg/dL Final     Comment:     Desirable:       <100 mg/dl       Prescription approved per [facility] Refill Protocol or patient Primary care provider (PCP) Chart and last OV reviewed   TARA Diaz RN/Malcolm Ledesma

## 2020-08-28 RX ORDER — AZITHROMYCIN 250 MG/1
TABLET, FILM COATED ORAL
Qty: 6 TABLET | Refills: 0 | Status: SHIPPED | OUTPATIENT
Start: 2020-08-28 | End: 2021-03-04

## 2020-10-01 DIAGNOSIS — Z79.4 CONTROLLED TYPE 2 DIABETES MELLITUS WITHOUT COMPLICATION, WITH LONG-TERM CURRENT USE OF INSULIN (H): ICD-10-CM

## 2020-10-01 DIAGNOSIS — E11.9 CONTROLLED TYPE 2 DIABETES MELLITUS WITHOUT COMPLICATION, WITH LONG-TERM CURRENT USE OF INSULIN (H): ICD-10-CM

## 2020-10-02 RX ORDER — INSULIN GLARGINE 100 [IU]/ML
INJECTION, SOLUTION SUBCUTANEOUS
Qty: 15 ML | Refills: 3 | Status: SHIPPED | OUTPATIENT
Start: 2020-10-02 | End: 2021-07-16

## 2020-10-02 NOTE — TELEPHONE ENCOUNTER
Prescription approved per [facility] Refill Protocol.  Patient called clinic, spoke to FLOYD Collins to say he was out of insulin  Isabella Underwood RN

## 2020-10-15 ENCOUNTER — TELEPHONE (OUTPATIENT)
Dept: FAMILY MEDICINE | Facility: CLINIC | Age: 55
End: 2020-10-15

## 2020-10-15 DIAGNOSIS — Z79.4 CONTROLLED TYPE 2 DIABETES MELLITUS WITHOUT COMPLICATION, WITH LONG-TERM CURRENT USE OF INSULIN (H): Primary | ICD-10-CM

## 2020-10-15 DIAGNOSIS — E11.9 CONTROLLED TYPE 2 DIABETES MELLITUS WITHOUT COMPLICATION, WITH LONG-TERM CURRENT USE OF INSULIN (H): Primary | ICD-10-CM

## 2020-10-17 RX ORDER — BLOOD-GLUCOSE,RECEIVER,CONT
EACH MISCELLANEOUS
Qty: 1 EACH | Refills: 0 | Status: SHIPPED | OUTPATIENT
Start: 2020-10-17 | End: 2020-10-27

## 2020-10-20 ENCOUNTER — TELEPHONE (OUTPATIENT)
Dept: FAMILY MEDICINE | Facility: CLINIC | Age: 55
End: 2020-10-20

## 2020-10-20 NOTE — TELEPHONE ENCOUNTER
Prior Authorization Approval    Authorization Effective Date: 9/20/2020  Authorization Expiration Date: 10/20/2023  Medication: DEXCOM G5  KIT- APPROVED   Approved Dose/Quantity:   Reference #:     Insurance Company: [employer] - Phone [phone] Fax [phone]  Expected CoPay:       CoPay Card Available:      Foundation Assistance Needed:    Which Pharmacy is filling the prescription (Not needed for infusion/clinic administered): [id number] [place], [place] - [address] [place] [address]  Pharmacy Notified: Yes  Patient Notified: Comment:  **Instructed pharmacy to notify patient when script is ready to /ship.**

## 2020-10-20 NOTE — TELEPHONE ENCOUNTER
Prior Authorization Retail Medication Request    Medication/Dose: Dexcom  ICD code (if different than what is on RX):  Controlled type 2 diabetes mellitus without complication, with long-term current use of insulin (H) [E11.9, Z79.4]  Previously Tried and Failed:    Rationale:      Insurance Name:  [employer]  TRX Code: [email or web address]      Pharmacy Information (if different than what is on RX)  Name:  Formerly named [facility]  Phone:  [phone]

## 2020-10-20 NOTE — TELEPHONE ENCOUNTER
Central Prior Authorization Team  Phone: [phone]    PA Initiation    Medication: Dexcom  Insurance Company: [employer] - Phone [phone] Fax [phone]  Pharmacy Filling the Rx: [id number] - [place] - [address]  Filling Pharmacy Phone: [phone]  Filling Pharmacy Fax:    Start Date: 10/20/2020

## 2020-10-20 NOTE — TELEPHONE ENCOUNTER
Central Prior Authorization Team  Phone: [phone]    PA Initiation    Medication: DEXCOM G5 MOBILE ) NICOLE  Insurance Company: [employer] - Phone [phone] Fax [phone]  Pharmacy Filling the Rx: [id number] - [place], [place] - [address]  Filling Pharmacy Phone: [phone]  Filling Pharmacy Fax:    Start Date: 10/20/2020

## 2020-10-27 ENCOUNTER — TELEPHONE (OUTPATIENT)
Dept: FAMILY MEDICINE | Facility: CLINIC | Age: 55
End: 2020-10-27

## 2020-10-27 DIAGNOSIS — E11.9 CONTROLLED TYPE 2 DIABETES MELLITUS WITHOUT COMPLICATION, WITH LONG-TERM CURRENT USE OF INSULIN (H): Primary | ICD-10-CM

## 2020-10-27 DIAGNOSIS — Z79.4 CONTROLLED TYPE 2 DIABETES MELLITUS WITHOUT COMPLICATION, WITH LONG-TERM CURRENT USE OF INSULIN (H): Primary | ICD-10-CM

## 2020-10-27 RX ORDER — PROCHLORPERAZINE 25 MG/1
SUPPOSITORY RECTAL
Qty: 1 EACH | Refills: 0 | Status: SHIPPED | OUTPATIENT
Start: 2020-10-27

## 2020-10-27 NOTE — TELEPHONE ENCOUNTER
Sent Rx for Dexacom G6  per pharmacy request due to the G5 being obsolete    Isabella Underwood RN

## 2020-12-27 DIAGNOSIS — E11.9 CONTROLLED TYPE 2 DIABETES MELLITUS WITHOUT COMPLICATION, WITH LONG-TERM CURRENT USE OF INSULIN (H): ICD-10-CM

## 2020-12-27 DIAGNOSIS — Z79.4 CONTROLLED TYPE 2 DIABETES MELLITUS WITHOUT COMPLICATION, WITH LONG-TERM CURRENT USE OF INSULIN (H): ICD-10-CM

## 2020-12-29 RX ORDER — GLIPIZIDE 10 MG/1
TABLET, FILM COATED, EXTENDED RELEASE ORAL
Qty: 90 TABLET | Refills: 1 | Status: SHIPPED | OUTPATIENT
Start: 2020-12-29 | End: 2021-06-21

## 2020-12-29 NOTE — TELEPHONE ENCOUNTER
Last Written Prescription Date:  3/14/20  Last Fill Quantity: 90,  # refills: 1   Last office visit: 2/14/2020 with prescribing provider:  Dr. Jean   VIRTUAL visit 6/24/20 with Miranda with advised F/U in 6 months for Diabetes  Future Office Visit: none    Routing refill request to provider for review/approval because:  Labs not current:  A1c  Patient needs to be seen because:  Overdue for Diabetes F/U  Break in medication: last prescribed 3/14/20 for 6 month supply.    Lab Results   Component Value Date    A1C 7.7 06/22/2020    A1C 8.1 02/14/2020    A1C 7.0 11/15/2019    A1C 8.1 07/19/2019    A1C 8.1 01/11/2019       Zoya Brewer, RN, BSN  [facility]

## 2021-01-13 ENCOUNTER — TELEPHONE (OUTPATIENT)
Dept: FAMILY MEDICINE | Facility: CLINIC | Age: 56
End: 2021-01-13

## 2021-01-13 DIAGNOSIS — Z79.4 CONTROLLED TYPE 2 DIABETES MELLITUS WITHOUT COMPLICATION, WITH LONG-TERM CURRENT USE OF INSULIN (H): ICD-10-CM

## 2021-01-13 DIAGNOSIS — E11.9 CONTROLLED TYPE 2 DIABETES MELLITUS WITHOUT COMPLICATION, WITH LONG-TERM CURRENT USE OF INSULIN (H): ICD-10-CM

## 2021-01-13 RX ORDER — PEN NEEDLE, DIABETIC 32GX 5/32"
NEEDLE, DISPOSABLE MISCELLANEOUS
Qty: 400 EACH | Refills: 0 | Status: SHIPPED | OUTPATIENT
Start: 2021-01-13 | End: 2021-06-01

## 2021-01-13 NOTE — TELEPHONE ENCOUNTER
Requested Prescriptions   Pending Prescriptions Disp Refills     insulin pen needle (BD MUSA U/F) 32G X 4 MM miscellaneous 400 each 11     Sig: USE 4 PEN NEEDLES DAILY       There is no refill protocol information for this order        Last OV 6/24/20.    Routing refill request to provider for review/approval because:  Patient needs to be seen    Patient is also looking for a lab order for A1c.    Ana BSN-RN  Triage Nurse  [facility]

## 2021-01-13 NOTE — TELEPHONE ENCOUNTER
Reason for call:  Patient reporting a symptom    Symptom or request: Pt calling for 2 requests:  1.  Refill on BD Hailee needles - [facility]  2.  Order for repeat A1c lab level  Please call patient and advise.      Duration (how long have symptoms been present): ongoing    Have you been treated for this before? Yes    Additional comments:     Phone Number patient can be reached at:  Home number on file [phone] (home)    Best Time:  any    Can we leave a detailed message on this number:  YES    Call taken on 1/13/2021 at 9:45 AM by Charisse Scott

## 2021-01-17 DIAGNOSIS — I10 HYPERTENSION GOAL BP (BLOOD PRESSURE) < 140/90: ICD-10-CM

## 2021-01-19 RX ORDER — LOSARTAN POTASSIUM 100 MG/1
TABLET ORAL
Qty: 90 TABLET | Refills: 1 | Status: SHIPPED | OUTPATIENT
Start: 2021-01-19 | End: 2021-07-16

## 2021-01-19 NOTE — TELEPHONE ENCOUNTER
Routing refill request to provider for review/approval because:  Labs out of range:  Blood pressure not at goal      Danii Reyna RN

## 2021-03-04 ENCOUNTER — DOCUMENTATION ONLY (OUTPATIENT)
Dept: LAB | Facility: CLINIC | Age: 56
End: 2021-03-04

## 2021-03-04 DIAGNOSIS — E78.5 HYPERLIPIDEMIA LDL GOAL <100: Primary | ICD-10-CM

## 2021-03-04 DIAGNOSIS — E11.9 CONTROLLED TYPE 2 DIABETES MELLITUS WITHOUT COMPLICATION, WITH LONG-TERM CURRENT USE OF INSULIN (H): ICD-10-CM

## 2021-03-04 DIAGNOSIS — Z79.4 CONTROLLED TYPE 2 DIABETES MELLITUS WITHOUT COMPLICATION, WITH LONG-TERM CURRENT USE OF INSULIN (H): ICD-10-CM

## 2021-03-04 DIAGNOSIS — Z12.5 SCREENING FOR PROSTATE CANCER: ICD-10-CM

## 2021-03-04 NOTE — PROGRESS NOTES
Patient has an upcoming lab appointment on 03.08.21 at 2:15 pm. Please review and place future orders that may be needed. Otherwise please call patient to cancel lab appointment.    Thank you,  Bk lab staff

## 2021-03-08 DIAGNOSIS — E11.9 CONTROLLED TYPE 2 DIABETES MELLITUS WITHOUT COMPLICATION, WITH LONG-TERM CURRENT USE OF INSULIN (H): ICD-10-CM

## 2021-03-08 DIAGNOSIS — Z79.4 CONTROLLED TYPE 2 DIABETES MELLITUS WITHOUT COMPLICATION, WITH LONG-TERM CURRENT USE OF INSULIN (H): ICD-10-CM

## 2021-03-08 DIAGNOSIS — Z12.5 SCREENING FOR PROSTATE CANCER: ICD-10-CM

## 2021-03-08 DIAGNOSIS — E78.5 HYPERLIPIDEMIA LDL GOAL <100: ICD-10-CM

## 2021-03-08 LAB
ANION GAP SERPL CALCULATED.3IONS-SCNC: 5 MMOL/L (ref 3–14)
BUN SERPL-MCNC: 10 MG/DL (ref 7–30)
CALCIUM SERPL-MCNC: 9.3 MG/DL (ref 8.5–10.1)
CHLORIDE SERPL-SCNC: 104 MMOL/L (ref 94–109)
CHOLEST SERPL-MCNC: 130 MG/DL
CO2 SERPL-SCNC: 28 MMOL/L (ref 20–32)
CREAT SERPL-MCNC: 0.89 MG/DL (ref 0.66–1.25)
GFR SERPL CREATININE-BSD FRML MDRD: >90 ML/MIN/{1.73_M2}
GLUCOSE SERPL-MCNC: 210 MG/DL (ref 70–99)
HBA1C MFR BLD: 8.8 % (ref 0–5.6)
HDLC SERPL-MCNC: 30 MG/DL
LDLC SERPL CALC-MCNC: 52 MG/DL
NONHDLC SERPL-MCNC: 100 MG/DL
POTASSIUM SERPL-SCNC: 4.1 MMOL/L (ref 3.4–5.3)
PSA SERPL-ACNC: 0.18 UG/L (ref 0–4)
SODIUM SERPL-SCNC: 137 MMOL/L (ref 133–144)
TRIGL SERPL-MCNC: 240 MG/DL

## 2021-03-08 PROCEDURE — 80061 LIPID PANEL: CPT | Performed by: FAMILY MEDICINE

## 2021-03-08 PROCEDURE — 36415 COLL VENOUS BLD VENIPUNCTURE: CPT | Performed by: FAMILY MEDICINE

## 2021-03-08 PROCEDURE — G0103 PSA SCREENING: HCPCS | Performed by: FAMILY MEDICINE

## 2021-03-08 PROCEDURE — 83036 HEMOGLOBIN GLYCOSYLATED A1C: CPT | Performed by: FAMILY MEDICINE

## 2021-03-08 PROCEDURE — 80048 BASIC METABOLIC PNL TOTAL CA: CPT | Performed by: FAMILY MEDICINE

## 2021-03-10 ENCOUNTER — VIRTUAL VISIT (OUTPATIENT)
Dept: FAMILY MEDICINE | Facility: CLINIC | Age: 56
End: 2021-03-10
Payer: COMMERCIAL

## 2021-03-10 DIAGNOSIS — Z79.4 CONTROLLED TYPE 2 DIABETES MELLITUS WITHOUT COMPLICATION, WITH LONG-TERM CURRENT USE OF INSULIN (H): Primary | ICD-10-CM

## 2021-03-10 DIAGNOSIS — N52.9 ERECTILE DYSFUNCTION, UNSPECIFIED ERECTILE DYSFUNCTION TYPE: ICD-10-CM

## 2021-03-10 DIAGNOSIS — E11.9 CONTROLLED TYPE 2 DIABETES MELLITUS WITHOUT COMPLICATION, WITH LONG-TERM CURRENT USE OF INSULIN (H): Primary | ICD-10-CM

## 2021-03-10 PROCEDURE — 99214 OFFICE O/P EST MOD 30 MIN: CPT | Mod: TEL | Performed by: FAMILY MEDICINE

## 2021-03-10 RX ORDER — DULAGLUTIDE 3 MG/.5ML
0.5 INJECTION, SOLUTION SUBCUTANEOUS WEEKLY
Qty: 7 ML | Refills: 1 | Status: SHIPPED | OUTPATIENT
Start: 2021-03-10 | End: 2022-03-11

## 2021-03-10 RX ORDER — TADALAFIL 10 MG/1
10 TABLET ORAL DAILY PRN
Qty: 90 TABLET | Refills: 0 | Status: SHIPPED | OUTPATIENT
Start: 2021-03-10 | End: 2023-03-22

## 2021-03-10 NOTE — PROGRESS NOTES
"Stewart is a 55 year old who is being evaluated via a billable telephone visit.      What phone number would you like to be contacted at? [phone]  How would you like to obtain your AVS? Mail a copy    Assessment & Plan       (E11.9,  Z79.4) Controlled type 2 diabetes mellitus without complication, with long-term current use of insulin (H)  (primary encounter diagnosis)  Comment: uncontrolled. Will increase dose of GLP1 agonist, increase basal insulin, and recheck A1c is 3 months.   Plan: Dulaglutide (TRULICITY) 3 MG/0.5ML SOPN, **A1C         FUTURE anytime        See patient instructions.     (N52.9) Erectile dysfunction, unspecified erectile dysfunction type  Comment: no contraindications.   Plan: tadalafil (CIALIS) 10 MG tablet        reviewed side effects of medication     Patient Instructions   As always, good to talk with you.    Your hemoglobin A1c was 8.8.  I would like to see your A1c between 7.0 and 7.5.    Lifestyle is very important, I would recommend reducing your alcohol consumption by half and drinking more water.    Here the following changes I recommend also:    -Increase the Trulicity to 3 mg weekly.  A new prescription was sent to your [facility] pharmacy.  Make sure you take this medication every Sunday.  -Increase the Lantus insulin to 27 units at bedtime.  1 week later, if your blood sugars are greater than 150 in the morning, then increase the Lantus to 30 units at bedtime.      Keep the other medications the same.    I will place an order for an A1c. This would be a \"lab only\" appointment, no need for an office visit. Please call our clinic phone #, or use [employer], to set this up.      For the erectile dysfunction drugs, I would recommend Cialis 10 mg daily as needed.  There is a website called \"Good Rx \".  It legitimate.  Go to this website, print a coupon for Cialis 10 mg tablets, number 90 tablets.  Bring this coupon to the [facility] pharmacy in [place].  I sent a prescription to this pharmacy.  " It should be quite a cheaper, approximately $45.        Return in about 3 months (around 6/10/2021) for diabetes check.    Anahi Jean MD  [facility] William is a 55 year old who presents for the following health issues     HPI       Diabetes Follow-up    How often are you checking your blood sugar? Two times daily  Blood sugar testing frequency justification:  Uncontrolled diabetes  What time of day are you checking your blood sugars (select all that apply)?  Before and after meals  Have you had any blood sugars above 200?  Yes, higher ranges reading around 200-220 and highest reading was 400 after a meal   Have you had any blood sugars below 70?  Once in a while but not often per pt     What symptoms do you notice when your blood sugar is low?  None    What concerns do you have today about your diabetes? Blood sugar is often over 200     Do you have any of these symptoms? (Select all that apply)  Cramping in feet     Have you had a diabetic eye exam in the last 12 months? No          Hyperlipidemia Follow-Up      Are you regularly taking any medication or supplement to lower your cholesterol?   Yes- simvastatin    Are you having muscle aches or other side effects that you think could be caused by your cholesterol lowering medication?  No    Hypertension Follow-up      Do you check your blood pressure regularly outside of the clinic? No     Are you following a low salt diet? No    Are your blood pressures ever more than 140 on the top number (systolic) OR more   than 90 on the bottom number (diastolic), for example 140/90? No    BP Readings from Last 2 Encounters:   02/14/20 (!) 154/82   11/15/19 130/74     Hemoglobin A1C (%)   Date Value   03/08/2021 8.8 (H)   06/22/2020 7.7 (H)     LDL Cholesterol Calculated (mg/dL)   Date Value   03/08/2021 52   06/22/2020 47         How many servings of fruits and vegetables do you eat daily?  0-1    On average, how many sweetened  beverages do you drink each day (Examples: soda, juice, sweet tea, etc.  Do NOT count diet or artificially sweetened beverages)?   0-1 coffee or tea     How many days per week do you exercise enough to make your heart beat faster? 3 or less    How many minutes a day do you exercise enough to make your heart beat faster? 9 or less    How many days per week do you miss taking your medication? 0        Review of Systems   CONSTITUTIONAL: NEGATIVE for fever, chills, change in weight  ENT/MOUTH: NEGATIVE for ear, mouth and throat problems  RESP: NEGATIVE for significant cough or SOB  CV: NEGATIVE for chest pain, palpitations or peripheral edema  : negative for dysuria, hematuria, decreased urinary stream,  positive for erectile dysfunction      Objective           Vitals:  No vitals were obtained today due to virtual visit.    Physical Exam   healthy, alert and no distress  PSYCH: Alert   His affect is normal  RESP: No cough, no audible wheezing, able to talk in full sentences  Remainder of exam unable to be completed due to telephone visits            Phone call duration: 12 minutes    Anahi Jean MD

## 2021-03-10 NOTE — PATIENT INSTRUCTIONS
"As always, good to talk with you.    Your hemoglobin A1c was 8.8.  I would like to see your A1c between 7.0 and 7.5.    Lifestyle is very important, I would recommend reducing your alcohol consumption by half and drinking more water.    Here the following changes I recommend also:    -Increase the Trulicity to 3 mg weekly.  A new prescription was sent to your [facility] pharmacy.  Make sure you take this medication every Sunday.  -Increase the Lantus insulin to 27 units at bedtime.  1 week later, if your blood sugars are greater than 150 in the morning, then increase the Lantus to 30 units at bedtime.      Keep the other medications the same.    I will place an order for an A1c. This would be a \"lab only\" appointment, no need for an office visit. Please call our clinic phone #, or use [employer], to set this up.      For the erectile dysfunction drugs, I would recommend Cialis 10 mg daily as needed.  There is a website called \"Good Rx \".  It legitimate.  Go to this website, print a coupon for Cialis 10 mg tablets, number 90 tablets.  Bring this coupon to the [facility] pharmacy in [place].  I sent a prescription to this pharmacy.  It should be quite a cheaper, approximately $45.      "

## 2021-04-20 DIAGNOSIS — E11.9 CONTROLLED TYPE 2 DIABETES MELLITUS WITHOUT COMPLICATION (H): ICD-10-CM

## 2021-04-20 DIAGNOSIS — E11.9 CONTROLLED TYPE 2 DIABETES MELLITUS WITHOUT COMPLICATION, WITH LONG-TERM CURRENT USE OF INSULIN (H): ICD-10-CM

## 2021-04-20 DIAGNOSIS — Z79.4 CONTROLLED TYPE 2 DIABETES MELLITUS WITHOUT COMPLICATION, WITH LONG-TERM CURRENT USE OF INSULIN (H): ICD-10-CM

## 2021-04-20 RX ORDER — INSULIN LISPRO 100 [IU]/ML
INJECTION, SOLUTION INTRAVENOUS; SUBCUTANEOUS
Qty: 15 ML | Refills: 0 | Status: SHIPPED | OUTPATIENT
Start: 2021-04-20 | End: 2021-06-05

## 2021-05-30 DIAGNOSIS — I10 HYPERTENSION GOAL BP (BLOOD PRESSURE) < 140/90: ICD-10-CM

## 2021-05-30 DIAGNOSIS — Z79.4 CONTROLLED TYPE 2 DIABETES MELLITUS WITHOUT COMPLICATION, WITH LONG-TERM CURRENT USE OF INSULIN (H): ICD-10-CM

## 2021-05-30 DIAGNOSIS — E11.9 CONTROLLED TYPE 2 DIABETES MELLITUS WITHOUT COMPLICATION, WITH LONG-TERM CURRENT USE OF INSULIN (H): ICD-10-CM

## 2021-06-01 RX ORDER — PEN NEEDLE, DIABETIC 32GX 5/32"
NEEDLE, DISPOSABLE MISCELLANEOUS
Qty: 200 EACH | Refills: 0 | Status: SHIPPED | OUTPATIENT
Start: 2021-06-01 | End: 2021-07-27

## 2021-06-01 RX ORDER — LOSARTAN POTASSIUM 100 MG/1
TABLET ORAL
Qty: 30 TABLET | Refills: 0 | OUTPATIENT
Start: 2021-06-01

## 2021-06-01 NOTE — TELEPHONE ENCOUNTER
"Prescription approved per [facility] Refill Protocol.  Requested Prescriptions   Pending Prescriptions Disp Refills     BD MUSA U/F 32G X 4 MM insulin pen needle [Pharmacy Med Name: BD UF MUSA PEN NEEDLE [id number]] 200 each 0     Sig: USE 4 PEN NEEDLES DAILY       Diabetic Supplies Protocol Passed - 5/30/2021 12:36 PM        Passed - Medication is active on med list        Passed - Patient is 18 years of age or older        Passed - Recent (6 mo) or future (30 days) visit within the authorizing provider's specialty     Patient had office visit in the last 6 months or has a visit in the next 30 days with authorizing provider.  See \"Patient Info\" tab in inbasket, or \"Choose Columns\" in Meds & Orders section of the refill encounter.                       "

## 2021-06-04 DIAGNOSIS — Z79.4 CONTROLLED TYPE 2 DIABETES MELLITUS WITHOUT COMPLICATION, WITH LONG-TERM CURRENT USE OF INSULIN (H): ICD-10-CM

## 2021-06-04 DIAGNOSIS — E11.9 CONTROLLED TYPE 2 DIABETES MELLITUS WITHOUT COMPLICATION, WITH LONG-TERM CURRENT USE OF INSULIN (H): ICD-10-CM

## 2021-06-05 DIAGNOSIS — Z79.4 CONTROLLED TYPE 2 DIABETES MELLITUS WITHOUT COMPLICATION, WITH LONG-TERM CURRENT USE OF INSULIN (H): ICD-10-CM

## 2021-06-05 DIAGNOSIS — E11.9 CONTROLLED TYPE 2 DIABETES MELLITUS WITHOUT COMPLICATION, WITH LONG-TERM CURRENT USE OF INSULIN (H): ICD-10-CM

## 2021-06-05 RX ORDER — INSULIN LISPRO 100 [IU]/ML
INJECTION, SOLUTION INTRAVENOUS; SUBCUTANEOUS
Qty: 15 ML | Refills: 0 | Status: SHIPPED | OUTPATIENT
Start: 2021-06-05 | End: 2021-07-16

## 2021-06-05 NOTE — TELEPHONE ENCOUNTER
Patient requesting refill of insulin. Last visit 3/10, last labs 3/8. Refill sent to pharmacy.     Yaneli Delong RN 06/05/21 3:08 PM   [facility] Triage Nurse Advisor

## 2021-06-08 RX ORDER — INSULIN LISPRO 100 [IU]/ML
INJECTION, SOLUTION INTRAVENOUS; SUBCUTANEOUS
OUTPATIENT
Start: 2021-06-08

## 2021-06-08 NOTE — TELEPHONE ENCOUNTER
Notes from 3/10/21 office visit.    (E11.9,  Z79.4) Controlled type 2 diabetes mellitus without complication, with long-term current use of insulin (H)  (primary encounter diagnosis)  Comment: uncontrolled. Will increase dose of GLP1 agonist, increase basal insulin, and recheck A1c is 3 months.   Plan: Dulaglutide (TRULICITY) 3 MG/0.5ML SOPN, **A1C         FUTURE anytime        See patient instructions.

## 2021-06-08 NOTE — TELEPHONE ENCOUNTER
Called and scheduled patient.    Ana COREYN-RN  Triage Nurse  [facility]: [facility]    Refilled on 6/5/21 patient advised.

## 2021-06-15 DIAGNOSIS — Z79.4 CONTROLLED TYPE 2 DIABETES MELLITUS WITHOUT COMPLICATION, WITH LONG-TERM CURRENT USE OF INSULIN (H): ICD-10-CM

## 2021-06-15 DIAGNOSIS — E11.9 CONTROLLED TYPE 2 DIABETES MELLITUS WITHOUT COMPLICATION, WITH LONG-TERM CURRENT USE OF INSULIN (H): ICD-10-CM

## 2021-06-15 LAB — HBA1C MFR BLD: 9.2 % (ref 0–5.6)

## 2021-06-15 PROCEDURE — 36415 COLL VENOUS BLD VENIPUNCTURE: CPT | Performed by: FAMILY MEDICINE

## 2021-06-15 PROCEDURE — 83036 HEMOGLOBIN GLYCOSYLATED A1C: CPT | Performed by: FAMILY MEDICINE

## 2021-06-18 DIAGNOSIS — E11.9 CONTROLLED TYPE 2 DIABETES MELLITUS WITHOUT COMPLICATION, WITH LONG-TERM CURRENT USE OF INSULIN (H): ICD-10-CM

## 2021-06-18 DIAGNOSIS — Z79.4 CONTROLLED TYPE 2 DIABETES MELLITUS WITHOUT COMPLICATION, WITH LONG-TERM CURRENT USE OF INSULIN (H): ICD-10-CM

## 2021-06-20 NOTE — TELEPHONE ENCOUNTER
Routing refill request to provider for review/approval because:  Labs out of range:    Lab Results   Component Value Date    A1C 9.2 06/15/2021    A1C 8.8 03/08/2021    A1C 7.7 06/22/2020    A1C 8.1 02/14/2020    A1C 7.0 11/15/2019

## 2021-06-21 RX ORDER — GLIPIZIDE 10 MG/1
TABLET, FILM COATED, EXTENDED RELEASE ORAL
Qty: 90 TABLET | Refills: 1 | Status: SHIPPED | OUTPATIENT
Start: 2021-06-21 | End: 2021-07-21 | Stop reason: ALTCHOICE

## 2021-06-28 DIAGNOSIS — E11.65 UNCONTROLLED TYPE 2 DIABETES MELLITUS WITH HYPERGLYCEMIA (H): Primary | ICD-10-CM

## 2021-07-20 ENCOUNTER — PATIENT OUTREACH (OUTPATIENT)
Dept: EDUCATION SERVICES | Facility: CLINIC | Age: 56
End: 2021-07-20
Attending: FAMILY MEDICINE
Payer: COMMERCIAL

## 2021-07-20 DIAGNOSIS — E11.65 UNCONTROLLED TYPE 2 DIABETES MELLITUS WITH HYPERGLYCEMIA (H): ICD-10-CM

## 2021-07-20 PROCEDURE — G0108 DIAB MANAGE TRN  PER INDIV: HCPCS | Mod: 95

## 2021-07-20 NOTE — PROGRESS NOTES
"      Diabetes Self-Management Education & Support  Type of Service: Telephone Visit    How would patient like to obtain AVS? Mail a copy      Presents for: Individual review    SUBJECTIVE/OBJECTIVE:  Presents for: Individual review  Accompanied by: Self  Diabetes education in the past 24mo: Yes  Focus of Visit: Taking Medication  Diabetes type: Type 2  Disease course: Stable  How confident are you filling out medical forms by yourself:: Extremely  Diabetes management related comments/concerns: A1c is higher and I want to get this down.  Transportation concerns: No  Other concerns:: None  Cultural Influences/Ethnic Background:  [facility]    Diabetes Symptoms & Complications:  Complications assessed today?: No    Patient Problem List and Family Medical History reviewed for relevant medical history, current medical status, and diabetes risk factors.    Vitals:  There were no vitals taken for this visit.  Estimated body mass index is 28.74 kg/m  as calculated from the following:    Height as of 2/14/20: 1.727 m (5' 8\").    Weight as of 2/14/20: 85.7 kg (189 lb).   Last 3 BP:   BP Readings from Last 3 Encounters:   02/14/20 (!) 154/82   11/15/19 130/74   11/01/19 129/83       History   Smoking Status     Never Smoker   Smokeless Tobacco     Never Used       Labs:  Lab Results   Component Value Date    A1C 9.2 06/15/2021     Lab Results   Component Value Date     03/08/2021     Lab Results   Component Value Date    LDL 52 03/08/2021     HDL Cholesterol   Date Value Ref Range Status   03/08/2021 30 (L) >39 mg/dL Final   ]  GFR Estimate   Date Value Ref Range Status   03/08/2021 >90 >60 mL/min/[1.73_m2] Final     Comment:     Non  GFR Calc  Starting 12/18/2018, serum creatinine based estimated GFR (eGFR) will be   calculated using the Chronic Kidney Disease Epidemiology Collaboration   (CKD-EPI) equation.       GFR Estimate If Black   Date Value Ref Range Status   03/08/2021 >90 >60 mL/min/[1.73_m2] " Final     Comment:      GFR Calc  Starting 12/18/2018, serum creatinine based estimated GFR (eGFR) will be   calculated using the Chronic Kidney Disease Epidemiology Collaboration   (CKD-EPI) equation.       Lab Results   Component Value Date    CR 0.89 03/08/2021     No results found for: MICROALBUMIN    Healthy Eating:  Healthy Eating Assessed Today: Yes  Cultural/Holiness diet restrictions?: No  Meal planning/habits: Avoiding sweets, Smaller portions  Breakfast: plain coffee  Lunch: often skips or has Ensure lately  Dinner: larger meal; eats most foods before bed and is waking up higher  Steak or potatoes, yam, cassava, rice meat/starch/veggies  Beverages: Water, Alcohol (might have a bottle of wine)  Has patient met with a dietitian in the past?: Yes      Eats late at night when he gets home. If he is high in the middle of the night then he takes his Lantus.    Being Active:  Being Active Assessed Today: No    Monitoring:  Monitoring Assessed Today: Yes  Did patient bring glucose meter to appointment? : No  Blood Glucose Meter: CGM  Blood glucose trend: Fluctuating    Reports his BG is often about  before dinner. But then after dinner is high and in the morning he is high.     Taking Medications:  Diabetes Medication(s)     Biguanides       metFORMIN (GLUCOPHAGE) 500 MG tablet    TAKE 2 TABLETS BY MOUTH 2 TIMES DAILY WITH MEALS    Insulin       insulin lispro (HUMALOG KWIKPEN) 100 UNIT/ML (1 unit dial) KWIKPEN    INJECT 20 UNITS SUBCUTANEOUS 2 TIMES DAILY (BEFORE MEALS)     LANTUS SOLOSTAR 100 UNIT/ML soln    INJECT 24 UNITS SUBCUTANEOUS AT BEDTIME    Sulfonylureas       glipiZIDE (GLUCOTROL XL) 10 MG 24 hr tablet    TAKE 1 TABLET BY MOUTH EVERY DAY    Incretin Mimetic Agents (GLP-1 Receptor Agonists)       Dulaglutide (TRULICITY) 3 MG/0.5ML SOPN    Inject 0.5 mLs Subcutaneous once a week      Takes Trulicity every Sunday. Is not sure on the dose.   Lantus 0-0-0-27 (unless he is low at  bedtime). If he is low then he skips the dose and is high in the morning.    Taking Medication Assessed Today: Yes  Current Treatments: Insulin Injections, Non-insulin Injectables, Oral Medication (taken by mouth)  Dose schedule: At bedtime, Pre-dinner  Given by: Patient  Problems taking diabetes medications regularly?: Yes (difficulty with the mealtime insulin)  Diabetes medication side effects?: No    Problem Solving:  Problem Solving Assessed Today: Yes  Is the patient at risk for hypoglycemia?: Yes  Hypoglycemia Frequency: Rarely  Patient carries a carbohydrate source:  (advised to do so)    Reducing Risks:  Reducing Risks Assessed Today: No    Healthy Coping:  Healthy Coping Assessed Today: Yes  Emotional response to diabetes: Ready to learn  Stage of change: PREPARATION (Decided to change - considering how)  Patient Activation Measure Survey Score:  GAEL Score (Last Two) 4/5/2013   GAEL Raw Score 45   Activation Score 73.1   GAEL Level 4       Diabetes knowledge and skills assessment:   Patient is knowledgeable in diabetes management concepts related to: Being Active, Monitoring, Reducing Risks and Healthy Coping    Patient needs further education on the following diabetes management concepts: Healthy Eating, Taking Medication and Problem Solving    Based on learning assessment above, most appropriate setting for further diabetes education would be: Individual setting.      INTERVENTIONS:    Education provided today on:  AADE Self-Care Behaviors:  Healthy Eating: carbohydrate counting and consistency in amount, composition, and timing of food intake  Monitoring: log and interpret results  Taking Medication: action of prescribed medication, side effects of prescribed medications and when to take medications  Problem Solving: low blood glucose - causes, signs/symptoms, treatment and prevention    Opportunities for ongoing education and support in diabetes-self management were discussed.    Pt verbalized  understanding of concepts discussed and recommendations provided today.       Education Materials Provided:  No new materials provided today      ASSESSMENT:  Previous correction scale provided at last CDE visit in 2019:  Pre meal blood sugar  Breakfast, lunch, dinner only Additional unit of Humalog to add to meal dose   150-200 1 units   201-250 2 units   251+ 3 units              Was also advised to try IC of 1:10 at breakfast and 1:15 at lunch and dinner at last CDE visit. Pt not currently doing this however and it is unclear how much insulin he is actually taking since he is not following this sscale (reports 10-20 unit(s) humalog that he takes for his highs after dinner).     Takes Ensure for lunch if he has lunch. If he does not have Ensure for lunch then his BG is  mg/dL range and he will skip Humalog. Then his BG is high and he takes the Humalog but takes 10-20 unit(s) at this time. Then he often does not take his Lantus it sounds like or will take overnight instead of before bed based off his BG.     Spent much of visit explaining how he is just chasing his BG around.  Educated that a meal time dose of humalog will help so his BG does not rise so much with his meals and that we would recommend his sscale only if his BG is high before his meal. Explained how when he takes his insulin after he eats and based off that high BG how he is increasing his risk of lows now. He reports his evening meal is pretty consistent. Will, therefore, try just a set dose of humalog for this meal to see if this is easier for him. Will then monitor his BG after to see if this should be adjusted.     He is worried about lows and will then just skip lantus if his BG is 115 mg/dL for example before bed. But then he c/o highs in the am.  Explained that he should still take his Lantus. Suggested he have a snack before bed if BG is < 100 mg/dL and if it is > 100 mg/dL he should not need a snack but that he could set an alarm to  check his BG in the middle of the night to help him feel more at ease with this (or catch a potential low if needed).         Patient's most recent   Lab Results   Component Value Date    A1C 9.2 06/15/2021    is not meeting goal of <8.0    PLAN  See Patient Instructions for co-developed, patient-stated behavior change goals.  Try 5 unit(s) humalog with dinner + sscale if BG > 150 mg/dL. Still take 5 unit(s) with dinner even if BG is < 150 mg/dL before dinner. This is to help cover the meal.   Test BG before dinner and 2 hours later. Continue use of the k[employer] sensor but start using phone krystle so I can see the data as well.   Try to take Lantus at the same time daily.   Recommend to stop the Glipizide because pt is worried about lows and this increases his change of lows. Will route to PCP and then call pt to update him on this when I hear back. Can leave a vm.   If BG < 100 mg/dL before bed, eat a small snack (15 grams of carb).   Follow up scheduled 8/12.  Recommended to reduce his alcohol intake given he is on Metformin.   AVS printed and provided to patient today. See Follow-Up section for recommended follow-up.    WARREN Armstrong CDE    Time Spent: 37 minutes  Encounter Type: Individual    Any diabetes medication dose changes were made via the CDE Protocol and Collaborative Practice Agreement with the patient's referring provider. A copy of this encounter was shared with the provider.

## 2021-07-21 NOTE — PROGRESS NOTES
Called pt to update him to stop his glipizide. Removed this from his med list.     Pt reports his BG was 123 mg/dL before dinner yesterday. He took 5 unit(s) humalog with dinner like we talked about. Before bed he was 186 mg/dL. Then he took Lantus 27 unit(s) and this morning he was 168 mg/dL which he reports is far better than he normally is. Praised him on this and encouraged him to keep this up.     Mira Gutierrez, WARREN LD CDE

## 2021-07-26 DIAGNOSIS — E11.9 CONTROLLED TYPE 2 DIABETES MELLITUS WITHOUT COMPLICATION, WITH LONG-TERM CURRENT USE OF INSULIN (H): ICD-10-CM

## 2021-07-26 DIAGNOSIS — Z79.4 CONTROLLED TYPE 2 DIABETES MELLITUS WITHOUT COMPLICATION, WITH LONG-TERM CURRENT USE OF INSULIN (H): ICD-10-CM

## 2021-07-27 RX ORDER — PEN NEEDLE, DIABETIC 32GX 5/32"
NEEDLE, DISPOSABLE MISCELLANEOUS
Qty: 200 EACH | Refills: 0 | Status: SHIPPED | OUTPATIENT
Start: 2021-07-27 | End: 2021-08-29

## 2021-07-27 NOTE — TELEPHONE ENCOUNTER
Scheduled to see diabetes ed soon.    Prescription approved per [employer] Refill Protocol.    Angelica Chahal RN  [facility]

## 2021-08-26 DIAGNOSIS — Z79.4 CONTROLLED TYPE 2 DIABETES MELLITUS WITHOUT COMPLICATION, WITH LONG-TERM CURRENT USE OF INSULIN (H): ICD-10-CM

## 2021-08-26 DIAGNOSIS — E11.9 CONTROLLED TYPE 2 DIABETES MELLITUS WITHOUT COMPLICATION, WITH LONG-TERM CURRENT USE OF INSULIN (H): ICD-10-CM

## 2021-08-29 RX ORDER — PEN NEEDLE, DIABETIC 32GX 5/32"
NEEDLE, DISPOSABLE MISCELLANEOUS
Qty: 200 EACH | Refills: 0 | Status: SHIPPED | OUTPATIENT
Start: 2021-08-29 | End: 2021-12-20

## 2021-09-13 DIAGNOSIS — E11.9 CONTROLLED TYPE 2 DIABETES MELLITUS WITHOUT COMPLICATION, WITH LONG-TERM CURRENT USE OF INSULIN (H): ICD-10-CM

## 2021-09-13 DIAGNOSIS — Z79.4 CONTROLLED TYPE 2 DIABETES MELLITUS WITHOUT COMPLICATION, WITH LONG-TERM CURRENT USE OF INSULIN (H): ICD-10-CM

## 2021-09-15 RX ORDER — INSULIN LISPRO 100 [IU]/ML
INJECTION, SOLUTION INTRAVENOUS; SUBCUTANEOUS
Qty: 45 ML | Refills: 0 | Status: SHIPPED | OUTPATIENT
Start: 2021-09-15 | End: 2021-09-29

## 2021-09-29 ENCOUNTER — VIRTUAL VISIT (OUTPATIENT)
Dept: EDUCATION SERVICES | Facility: CLINIC | Age: 56
End: 2021-09-29
Payer: COMMERCIAL

## 2021-09-29 DIAGNOSIS — E11.9 CONTROLLED TYPE 2 DIABETES MELLITUS WITHOUT COMPLICATION, WITH LONG-TERM CURRENT USE OF INSULIN (H): ICD-10-CM

## 2021-09-29 DIAGNOSIS — Z79.4 CONTROLLED TYPE 2 DIABETES MELLITUS WITHOUT COMPLICATION, WITH LONG-TERM CURRENT USE OF INSULIN (H): ICD-10-CM

## 2021-09-29 PROCEDURE — 98966 PH1 ASSMT&MGMT NQHP 5-10: CPT | Performed by: DIETITIAN, REGISTERED

## 2021-09-29 RX ORDER — INSULIN LISPRO 100 [IU]/ML
INJECTION, SOLUTION INTRAVENOUS; SUBCUTANEOUS
Qty: 45 ML | Refills: 0 | Status: SHIPPED | OUTPATIENT
Start: 2021-09-29 | End: 2021-10-06

## 2021-09-29 NOTE — LETTER
9/29/2021         RE: Stewart Turcios  [address]  [place] [address]        Dear Colleague,    Thank you for referring your patient, Stewart Turcios, to the [facility]. Please see a copy of my visit note below.        Diabetes Follow-up  Type of Service: Telephone Visit    How would patient like to obtain AVS? Verbally discussed, not needed        Subjective/Objective:    Stewart Turcios was called for diabetes follow up appointment today. Last date of communication was: 9/21.    Diabetes is being managed with   Diabetes Medications   Diabetes Medication(s)     Biguanides       metFORMIN (GLUCOPHAGE) 500 MG tablet    TAKE 2 TABLETS BY MOUTH 2 TIMES DAILY WITH MEALS    Insulin       insulin lispro (HUMALOG KWIKPEN) 100 UNIT/ML (1 unit dial) KWIKPEN    +++NEED RECHECK+++INJECT 20 UNITS SUBCUTANEOUS 2 TIMES DAILY (BEFORE MEALS)     LANTUS SOLOSTAR 100 UNIT/ML soln    INJECT 24 UNITS SUBCUTANEOUS AT BEDTIME    Incretin Mimetic Agents (GLP-1 Receptor Agonists)       Dulaglutide (TRULICITY) 3 MG/0.5ML SOPN    Inject 0.5 mLs Subcutaneous once a week          BG/Food Log:   Reports morning numbers are 180-212 mg/dL range. Then during the day they are more like 100-150 mg/dL.   Uses the [employer] sensors but forgot to ask if he is using the phone krystle yet so I can track his numbers too.     Assessment:    Fasting blood glucose: 0% in target.      Plan/Response:  Raise Humalog from 5 to 7 unit(s) at evening meal. If -200 before this meal, then add 1 unit. Updated Rx to reflect new dose.  Start checking BG after dinner/before bed to help determine if meal dose should be higher or his Lantus.   Continue Lantus 0-0-0-27. Updated Rx to reflect current dose.   Will ask next week if he is using the [employer] phone krystle yet so I can connect to him and see his numbers in more detail.   Follow up in 1 week scheduled via phone to review his numbers again.    WARREN Armstrong  CDE    Time 9 min    Any diabetes medication dose changes were made via the CDE Protocol and Collaborative Practice Agreement with the patient's referring provider. A copy of this encounter was shared with the provider.

## 2021-09-29 NOTE — PROGRESS NOTES
Diabetes Follow-up  Type of Service: Telephone Visit    How would patient like to obtain AVS? Verbally discussed, not needed        Subjective/Objective:    Stewart Turcios was called for diabetes follow up appointment today. Last date of communication was: 9/21.    Diabetes is being managed with   Diabetes Medications   Diabetes Medication(s)     Biguanides       metFORMIN (GLUCOPHAGE) 500 MG tablet    TAKE 2 TABLETS BY MOUTH 2 TIMES DAILY WITH MEALS    Insulin       insulin lispro (HUMALOG KWIKPEN) 100 UNIT/ML (1 unit dial) KWIKPEN    +++NEED RECHECK+++INJECT 20 UNITS SUBCUTANEOUS 2 TIMES DAILY (BEFORE MEALS)     LANTUS SOLOSTAR 100 UNIT/ML soln    INJECT 24 UNITS SUBCUTANEOUS AT BEDTIME    Incretin Mimetic Agents (GLP-1 Receptor Agonists)       Dulaglutide (TRULICITY) 3 MG/0.5ML SOPN    Inject 0.5 mLs Subcutaneous once a week          BG/Food Log:   Reports morning numbers are 180-212 mg/dL range. Then during the day they are more like 100-150 mg/dL.   Uses the [employer] sensors but forgot to ask if he is using the phone krystle yet so I can track his numbers too.     Assessment:    Fasting blood glucose: 0% in target.      Plan/Response:  Raise Humalog from 5 to 7 unit(s) at evening meal. If -200 before this meal, then add 1 unit. Updated Rx to reflect new dose.  Start checking BG after dinner/before bed to help determine if meal dose should be higher or his Lantus.   Continue Lantus 0-0-0-27. Updated Rx to reflect current dose.   Will ask next week if he is using the [employer] phone krystle yet so I can connect to him and see his numbers in more detail.   Follow up in 1 week scheduled via phone to review his numbers again.    WARREN Armstrong CDE    Time 9 min    Any diabetes medication dose changes were made via the CDE Protocol and Collaborative Practice Agreement with the patient's referring provider. A copy of this encounter was shared with the provider.

## 2021-09-29 NOTE — Clinical Note
ALEXANDER, raised his meal time dose from 5 to 7 unit(s) today and told him to add 1 unit(s) if his BG is 150-200 mg/dL before meals per his sscale. Updated the Rx for his lantus to reflect current dose of 27 unit(s)/d but did not change his dose today. Will follow up in 1 week to review numbers again but they are improved nicely overall. Let me know if you have questions!  Mira Gutierrez, RD LD CDE

## 2021-10-06 ENCOUNTER — VIRTUAL VISIT (OUTPATIENT)
Dept: EDUCATION SERVICES | Facility: CLINIC | Age: 56
End: 2021-10-06
Payer: COMMERCIAL

## 2021-10-06 DIAGNOSIS — Z79.4 CONTROLLED TYPE 2 DIABETES MELLITUS WITHOUT COMPLICATION, WITH LONG-TERM CURRENT USE OF INSULIN (H): ICD-10-CM

## 2021-10-06 DIAGNOSIS — E11.9 CONTROLLED TYPE 2 DIABETES MELLITUS WITHOUT COMPLICATION, WITH LONG-TERM CURRENT USE OF INSULIN (H): ICD-10-CM

## 2021-10-06 PROCEDURE — 99207 PR NONPHYSICIAN TELEPHONE ASSESSMENT 5-10 MIN: CPT | Performed by: DIETITIAN, REGISTERED

## 2021-10-06 RX ORDER — INSULIN LISPRO 100 [IU]/ML
INJECTION, SOLUTION INTRAVENOUS; SUBCUTANEOUS
Qty: 45 ML | Refills: 0 | Status: SHIPPED | OUTPATIENT
Start: 2021-10-06 | End: 2021-12-23

## 2021-10-06 NOTE — LETTER
10/6/2021         RE: Stewart Turcios  [address]  [place] [address]        Dear Colleague,    Thank you for referring your patient, Stewart Turcios, to the [facility]. Please see a copy of my visit note below.    Diabetes Follow-up    Type of Service: Telephone Visit    How would patient like to obtain AVS? Annehart        Subjective/Objective:    Stewart Turcios was called for his follow up appt today. Last date of communication was: 9/29.    Diabetes is being managed with   Diabetes Medications   Diabetes Medication(s)     Biguanides       metFORMIN (GLUCOPHAGE) 500 MG tablet    TAKE 2 TABLETS BY MOUTH 2 TIMES DAILY WITH MEALS    Insulin       insulin glargine (LANTUS SOLOSTAR) 100 UNIT/ML pen    Inject 27 Units Subcutaneous At Bedtime     insulin lispro (HUMALOG KWIKPEN) 100 UNIT/ML (1 unit dial) KWIKPEN    +++NEED RECHECK+++INJECT 7 UNITS SUBCUTANEOUS 2 TIMES DAILY (BEFORE MEALS) + sscale. TDD ~20 unit(s)    Incretin Mimetic Agents (GLP-1 Receptor Agonists)       Dulaglutide (TRULICITY) 3 MG/0.5ML SOPN    Inject 0.5 mLs Subcutaneous once a week      Taking Lantus 24-27 unit(s) per day but usually 24 unit(s).  Reports taking his Humalog before his meals now (was previously taking it after when his BG would rise after eating). Praised him on this.    BG/Food Log:   Using [employer] still. Is not sure how to do the krystle.    Morning numbers in the 190's lately per pt. Currently is 155 mg/dL now and has not eaten yet today. Was 250 mg/dL around 3 am. Took 12 unit(s) Humalog this morning to bring his BG down. Has been stable in the 150's for the day since then.     Assessment:    Eats his main meal in the evening. Feels he eats too much and this is why his BG goes so high after dinner and is high in the morning. Has been playing around with his humalog dose at dinner and taking sometimes 10 unit(s) with this meal. Would benefit from consistently taking 10 unit(s)  with dinner and then also raising his Lantus dose a bit.     He is open to adding the niharika krystle on his phone. Educated that it is the [employer] krystle and he should add this and then let me know his e-mail so I can link to him and see his data. He verbalized understanding. Explained that then he can just scan his sensor using his phone and it will work just like his reader when he is using the krystle.     He is wondering when he can have his A1c checked. Discussed that he is due and there is an order in already so he can call to schedule this. He is hoping it will be better than last time. Is motivated to try to improve his numbers.     Plan/Response:  Increase Lantus 24 --> 26 unit(s) per day.   Increase Humalog 7 --> 10 unit(s) with dinner (usually just eats 1 meal per day).   Download the [employer] krystle and mychart me the e-mail used so I can link with him on Niharika and see his data.   Make a lab appointment to check Hgb A1c (already ordered).   Follow up scheduled in 1 week to review his numbers and insulin dosing.    WARREN Armstrong CDE  Time spent 9 min      Any diabetes medication dose changes were made via the CDE Protocol and Collaborative Practice Agreement with the patient's referring provider. A copy of this encounter was shared with the provider.

## 2021-10-06 NOTE — PROGRESS NOTES
Diabetes Follow-up    Type of Service: Telephone Visit    How would patient like to obtain AVS? Chandrika        Subjective/Objective:    Stewart Turcios was called for his follow up appt today. Last date of communication was: 9/29.    Diabetes is being managed with   Diabetes Medications   Diabetes Medication(s)     Biguanides       metFORMIN (GLUCOPHAGE) 500 MG tablet    TAKE 2 TABLETS BY MOUTH 2 TIMES DAILY WITH MEALS    Insulin       insulin glargine (LANTUS SOLOSTAR) 100 UNIT/ML pen    Inject 27 Units Subcutaneous At Bedtime     insulin lispro (HUMALOG KWIKPEN) 100 UNIT/ML (1 unit dial) KWIKPEN    +++NEED RECHECK+++INJECT 7 UNITS SUBCUTANEOUS 2 TIMES DAILY (BEFORE MEALS) + sscale. TDD ~20 unit(s)    Incretin Mimetic Agents (GLP-1 Receptor Agonists)       Dulaglutide (TRULICITY) 3 MG/0.5ML SOPN    Inject 0.5 mLs Subcutaneous once a week      Taking Lantus 24-27 unit(s) per day but usually 24 unit(s).  Reports taking his Humalog before his meals now (was previously taking it after when his BG would rise after eating). Praised him on this.    BG/Food Log:   Using [employer] reader still. Is not sure how to do the krystle.    Morning numbers in the 190's lately per pt. Currently is 155 mg/dL now and has not eaten yet today. Was 250 mg/dL around 3 am. Took 12 unit(s) Humalog this morning to bring his BG down. Has been stable in the 150's for the day since then.     Assessment:    Eats his main meal in the evening. Feels he eats too much and this is why his BG goes so high after dinner and is high in the morning. Has been playing around with his humalog dose at dinner and taking sometimes 10 unit(s) with this meal. Would benefit from consistently taking 10 unit(s) with dinner and then also raising his Lantus dose a bit.     He is open to adding the [employer] krystle on his phone. Educated that it is the [employer] krystle and he should add this and then let me know his e-mail so I can link to him and see his data. He verbalized  understanding. Explained that then he can just scan his sensor using his phone and it will work just like his reader when he is using the krystle.     He is wondering when he can have his A1c checked. Discussed that he is due and there is an order in already so he can call to schedule this. He is hoping it will be better than last time. Is motivated to try to improve his numbers.     Plan/Response:  Increase Lantus 24 --> 26 unit(s) per day.   Increase Humalog 7 --> 10 unit(s) with dinner (usually just eats 1 meal per day).   Download the [employer] krystle and Tizarot me the e-mail used so I can link with him on [employer] and see his data.   Make a lab appointment to check Hgb A1c (already ordered).   Follow up scheduled in 1 week to review his numbers and insulin dosing.    WARREN Armstrong CDE  Time spent 9 min      Any diabetes medication dose changes were made via the CDE Protocol and Collaborative Practice Agreement with the patient's referring provider. A copy of this encounter was shared with the provider.

## 2021-10-06 NOTE — Clinical Note
FYI, raised his lantus a bit today from 24 to 26 unit(s)/d and also raised his humalog from 7 to 10 unit(s) with dinner. Also, reminded him to schedule a lab appt as he he is due for his A1c. Will follow up again in 1 week. Let me know if you have questions!  Mira Gutierrez RD LD CDE

## 2021-10-27 ENCOUNTER — TELEPHONE (OUTPATIENT)
Dept: FAMILY MEDICINE | Facility: CLINIC | Age: 56
End: 2021-10-27

## 2021-11-02 ENCOUNTER — LAB (OUTPATIENT)
Dept: LAB | Facility: CLINIC | Age: 56
End: 2021-11-02
Payer: COMMERCIAL

## 2021-11-02 DIAGNOSIS — E11.65 UNCONTROLLED TYPE 2 DIABETES MELLITUS WITH HYPERGLYCEMIA (H): ICD-10-CM

## 2021-11-02 LAB
HBA1C MFR BLD: 8.8 % (ref 0–5.6)
HOLD SPECIMEN: NORMAL

## 2021-11-02 PROCEDURE — 83036 HEMOGLOBIN GLYCOSYLATED A1C: CPT

## 2021-11-02 PROCEDURE — 36415 COLL VENOUS BLD VENIPUNCTURE: CPT

## 2021-11-04 ENCOUNTER — MYC MEDICAL ADVICE (OUTPATIENT)
Dept: EDUCATION SERVICES | Facility: CLINIC | Age: 56
End: 2021-11-04

## 2021-11-04 NOTE — TELEPHONE ENCOUNTER
Mira,    I responded to [employer] message. I gave him the scheduling phone number to make an appt with you. I told him I would make sure you saw the message as well, but looks like he just needs an appt.    Carmen Vogel RN, [facility]

## 2021-11-06 ENCOUNTER — HEALTH MAINTENANCE LETTER (OUTPATIENT)
Age: 56
End: 2021-11-06

## 2021-11-16 DIAGNOSIS — E78.5 HYPERLIPIDEMIA LDL GOAL <100: ICD-10-CM

## 2021-11-18 RX ORDER — SIMVASTATIN 10 MG
10 TABLET ORAL AT BEDTIME
Qty: 90 TABLET | Refills: 0 | Status: SHIPPED | OUTPATIENT
Start: 2021-11-18 | End: 2022-01-02

## 2021-12-17 DIAGNOSIS — Z79.4 CONTROLLED TYPE 2 DIABETES MELLITUS WITHOUT COMPLICATION, WITH LONG-TERM CURRENT USE OF INSULIN (H): ICD-10-CM

## 2021-12-17 DIAGNOSIS — E11.9 CONTROLLED TYPE 2 DIABETES MELLITUS WITHOUT COMPLICATION, WITH LONG-TERM CURRENT USE OF INSULIN (H): ICD-10-CM

## 2021-12-20 RX ORDER — PEN NEEDLE, DIABETIC 32GX 5/32"
NEEDLE, DISPOSABLE MISCELLANEOUS
Qty: 200 EACH | Refills: 0 | Status: SHIPPED | OUTPATIENT
Start: 2021-12-20 | End: 2022-02-21

## 2021-12-22 ENCOUNTER — MYC MEDICAL ADVICE (OUTPATIENT)
Dept: EDUCATION SERVICES | Facility: CLINIC | Age: 56
End: 2021-12-22
Payer: COMMERCIAL

## 2021-12-22 DIAGNOSIS — E11.9 CONTROLLED TYPE 2 DIABETES MELLITUS WITHOUT COMPLICATION, WITH LONG-TERM CURRENT USE OF INSULIN (H): ICD-10-CM

## 2021-12-22 DIAGNOSIS — Z79.4 CONTROLLED TYPE 2 DIABETES MELLITUS WITHOUT COMPLICATION, WITH LONG-TERM CURRENT USE OF INSULIN (H): ICD-10-CM

## 2021-12-23 ENCOUNTER — VIRTUAL VISIT (OUTPATIENT)
Dept: EDUCATION SERVICES | Facility: CLINIC | Age: 56
End: 2021-12-23
Payer: COMMERCIAL

## 2021-12-23 DIAGNOSIS — E11.9 CONTROLLED TYPE 2 DIABETES MELLITUS WITHOUT COMPLICATION, WITH LONG-TERM CURRENT USE OF INSULIN (H): ICD-10-CM

## 2021-12-23 DIAGNOSIS — Z79.4 CONTROLLED TYPE 2 DIABETES MELLITUS WITHOUT COMPLICATION, WITH LONG-TERM CURRENT USE OF INSULIN (H): ICD-10-CM

## 2021-12-23 DIAGNOSIS — E11.65 UNCONTROLLED TYPE 2 DIABETES MELLITUS WITH HYPERGLYCEMIA (H): Primary | ICD-10-CM

## 2021-12-23 PROCEDURE — 98968 PH1 ASSMT&MGMT NQHP 21-30: CPT | Mod: 95 | Performed by: DIETITIAN, REGISTERED

## 2021-12-23 RX ORDER — INSULIN GLARGINE 100 [IU]/ML
27 INJECTION, SOLUTION SUBCUTANEOUS AT BEDTIME
Qty: 30 ML | Refills: 3 | Status: SHIPPED | OUTPATIENT
Start: 2021-12-23 | End: 2023-07-03

## 2021-12-23 RX ORDER — INSULIN LISPRO 100 [IU]/ML
INJECTION, SOLUTION INTRAVENOUS; SUBCUTANEOUS
Qty: 45 ML | Refills: 0 | Status: SHIPPED | OUTPATIENT
Start: 2021-12-23 | End: 2022-01-06

## 2021-12-23 NOTE — LETTER
12/23/2021         RE: Stewart Turcios  [address]  [place] [address]        Dear Colleague,    Thank you for referring your patient, Stewart Turcios, to the [facility] [place]. Please see a copy of my visit note below.        Diabetes Follow-up  Type of Service: Telephone Visit    Originating Location (Patient Location): Other work  Distant Location (Provider Location): Home  Mode of Communication:  Telephone    Telephone Visit Start Time: 10:30 am  Telephone Visit End Time (telephone visit stop time): 10:51 am    How would patient like to obtain AVS? MyChart        Subjective/Objective:    Stewart Turcios was called for BG review. Last date of communication was: 12/22/21.    Diabetes is being managed with   Diabetes Medications   Diabetes Medication(s)     Biguanides       metFORMIN (GLUCOPHAGE) 500 MG tablet    TAKE 2 TABLETS BY MOUTH 2 TIMES DAILY WITH MEALS    Insulin       insulin glargine (LANTUS SOLOSTAR) 100 UNIT/ML pen    Inject 26 Units Subcutaneous At Bedtime     insulin lispro (HUMALOG KWIKPEN) 100 UNIT/ML (1 unit dial) KWIKPEN    +++NEED RECHECK+++INJECT 10 UNITS SUBCUTANEOUS 2 TIMES DAILY (BEFORE MEALS) + sscale. TDD ~20 unit(s)    Incretin Mimetic Agents (GLP-1 Receptor Agonists)       Dulaglutide (TRULICITY) 3 MG/0.5ML SOPN    Inject 0.5 mLs Subcutaneous once a week        Previous correction scale given to patient:  Pre meal blood sugar  Breakfast, lunch, dinner only Additional unit of Humalog to add to meal dose   150-200 1 units   201-250 2 units   251-300 3 units   > 300  4 units     Based off his current TDD, this is still appropriate.     BG/Food Log:   See below. Unable to upload his meter/kenji but he reviewed his hx with me over the phone.   Ave 7 day  mg/dL  14 day ave 205 mg/dL    Assessment:  Breakfast is coffee and bagel or sandwich lately.   No lunch  Big meal at dinner. Usually a glass of wine with it.     When he is home in the  evening, if he does not have lunch he is about 115 mg/dL prior to dinner. Might take 6 unit(s) with dinner in this case because his BG is lower and he is nervous. Then before bed he is > 200 mg/dL. Then he will take 18 unit(s) humalog and also the 27 unit(s) Lantus. Morning is < 200 mg/dL if he does this. When he does NOT do this, then his BG is high. This morning he was 211 mg/dL and then he took 18 unit(s) humalog with his tea and afterward he was 173 mg/dL. Right now, he is 131 mg/dL (couple hours after breakfast).  Last night he did 27 unit(s) Lantus and 18 unit(s) humalog.     He is not taking enough Humalog with dinner it sounds like and then is taking extra before bed usually to compensate. Educated that it would be safer and healthier to take more at dinner so he does not rise so much.      Had discussed raising his humalog to 10 unit(s) with dinner last time so encouraged him to try this again. Then if he continues to be > 200 mg/dL after dinner the next few days we discussed raising it to 12 unit(s) at dinner. He verbalized understanding of this. He was wondering if he should ever NOT take it with dinner. Explained that if his BG is too low (< 70), then he should first correct this and THEN take his humalog with dinner and eat his meal. Reviewed proper hypoglycemia treatment.     Previously he was not eating breakfast so not taking humalog in the am.  Lately he is having a bagel or sandwich and would benefit from having humalog with this. For ease of remembering and consistency, would keep the dose the same as dinner if possible so encouraged to take 10 unit(s) at this meal too if he eats (+ correction if > 200 mg/dL before breakfast).       Plan/Response:  If < 70 mg/dL before dinner, then drink 1/2 c juice or pop to correct. Then take 10 unit(s) humalog and eat dinner.   Take 10 unit(s) humalog with dinner and take 10 unit(s) with breakfast if having a bagel or sandwich.  If > 200 mg/dL after dinner the  next 4-5 days, then increase to 12 unit(s) humalog at dinner.   Continue to take Lantus 27 unit(s) per day.   If BG is elevated before bed and before breakfast, do the followin-250 take 2 unit(s) humalog and if > 250 then take 3 unit(s) and if > 300 mg/dL then take 4 unit(s).   Follow up over [facility] in a few weeks.     WARREN Armstrong CDE  Time spent 21 min      Any diabetes medication dose changes were made via the CDE Protocol and Collaborative Practice Agreement with the patient's referring provider. A copy of this encounter was shared with the provider.

## 2021-12-23 NOTE — PROGRESS NOTES
Diabetes Follow-up  Type of Service: Telephone Visit    Originating Location (Patient Location): Other work  Distant Location (Provider Location): Home  Mode of Communication:  Telephone    Telephone Visit Start Time: 10:30 am  Telephone Visit End Time (telephone visit stop time): 10:51 am    How would patient like to obtain AVS? Chandrika        Subjective/Objective:    Stewart Turcios was called for BG review. Last date of communication was: 12/22/21.    Diabetes is being managed with   Diabetes Medications   Diabetes Medication(s)     Biguanides       metFORMIN (GLUCOPHAGE) 500 MG tablet    TAKE 2 TABLETS BY MOUTH 2 TIMES DAILY WITH MEALS    Insulin       insulin glargine (LANTUS SOLOSTAR) 100 UNIT/ML pen    Inject 26 Units Subcutaneous At Bedtime     insulin lispro (HUMALOG KWIKPEN) 100 UNIT/ML (1 unit dial) KWIKPEN    +++NEED RECHECK+++INJECT 10 UNITS SUBCUTANEOUS 2 TIMES DAILY (BEFORE MEALS) + sscale. TDD ~20 unit(s)    Incretin Mimetic Agents (GLP-1 Receptor Agonists)       Dulaglutide (TRULICITY) 3 MG/0.5ML SOPN    Inject 0.5 mLs Subcutaneous once a week        Previous correction scale given to patient:  Pre meal blood sugar  Breakfast, lunch, dinner only Additional unit of Humalog to add to meal dose   150-200 1 units   201-250 2 units   251-300 3 units   > 300  4 units     Based off his current TDD, this is still appropriate.     BG/Food Log:   See below. Unable to upload his meter/kenji but he reviewed his hx with me over the phone.   Ave 7 day  mg/dL  14 day ave 205 mg/dL    Assessment:  Breakfast is coffee and bagel or sandwich lately.   No lunch  Big meal at dinner. Usually a glass of wine with it.     When he is home in the evening, if he does not have lunch he is about 115 mg/dL prior to dinner. Might take 6 unit(s) with dinner in this case because his BG is lower and he is nervous. Then before bed he is > 200 mg/dL. Then he will take 18 unit(s) humalog and also the 27 unit(s) Lantus.  Morning is < 200 mg/dL if he does this. When he does NOT do this, then his BG is high. This morning he was 211 mg/dL and then he took 18 unit(s) humalog with his tea and afterward he was 173 mg/dL. Right now, he is 131 mg/dL (couple hours after breakfast).  Last night he did 27 unit(s) Lantus and 18 unit(s) humalog.     He is not taking enough Humalog with dinner it sounds like and then is taking extra before bed usually to compensate. Educated that it would be safer and healthier to take more at dinner so he does not rise so much.      Had discussed raising his humalog to 10 unit(s) with dinner last time so encouraged him to try this again. Then if he continues to be > 200 mg/dL after dinner the next few days we discussed raising it to 12 unit(s) at dinner. He verbalized understanding of this. He was wondering if he should ever NOT take it with dinner. Explained that if his BG is too low (< 70), then he should first correct this and THEN take his humalog with dinner and eat his meal. Reviewed proper hypoglycemia treatment.     Previously he was not eating breakfast so not taking humalog in the am.  Lately he is having a bagel or sandwich and would benefit from having humalog with this. For ease of remembering and consistency, would keep the dose the same as dinner if possible so encouraged to take 10 unit(s) at this meal too if he eats (+ correction if > 200 mg/dL before breakfast).       Plan/Response:  If < 70 mg/dL before dinner, then drink 1/2 c juice or pop to correct. Then take 10 unit(s) humalog and eat dinner.   Take 10 unit(s) humalog with dinner and take 10 unit(s) with breakfast if having a bagel or sandwich.  If > 200 mg/dL after dinner the next 4-5 days, then increase to 12 unit(s) humalog at dinner.   Continue to take Lantus 27 unit(s) per day.   If BG is elevated before bed and before breakfast, do the followin-250 take 2 unit(s) humalog and if > 250 then take 3 unit(s) and if > 300 mg/dL then  take 4 unit(s).   Follow up over mychart in a few weeks.     WARREN Armstrong CDE  Time spent 21 min      Any diabetes medication dose changes were made via the CDE Protocol and Collaborative Practice Agreement with the patient's referring provider. A copy of this encounter was shared with the provider.

## 2021-12-23 NOTE — TELEPHONE ENCOUNTER
Mira,    Just ALEXANDER he could not upload his meter at home. No options for uploading today or tomorrow before his appt in clinics near him.    Carmen Vogel RN, [facility]

## 2021-12-23 NOTE — Clinical Note
ALEXANDER, no change to his lantus today. Told him to raise his Humalog to 10 unit(s) with dinner and to also take this before breakfast if he eats a bagel or sandwich.  Reiterated the proper correction scale with him of 1 unit(s) per 50 mg/dL > 150 mg/dL and when he could take this (before bed and before breakfast for him since he does not eat lunch or have humalog with him at work usually). He is supposed to message me in a few weeks with how this is going. Let me know if you have questions!  Mira Gutierrez, WARREN LD CDE

## 2021-12-24 RX ORDER — FLASH GLUCOSE SENSOR
1 KIT MISCELLANEOUS
Qty: 2 EACH | Refills: 3 | Status: SHIPPED | OUTPATIENT
Start: 2021-12-24 | End: 2022-08-02

## 2021-12-24 NOTE — TELEPHONE ENCOUNTER
Requested Prescriptions   Pending Prescriptions Disp Refills     Continuous Blood Gluc Sensor (FREESTYLE CINDY 14 DAY SENSOR) MISC [Pharmacy Med Name: FREESTYLE CINDY 14 DAY SENSOR] 2 each 3     Si EACH EVERY 14 DAYS CHANGE EVERY 14 DAYS.       There is no refill protocol information for this order        Prescription approved per [facility] Refill Protocol.  Ana RN,BSN  Triage Nurse  [facility]  Ph: [phone]

## 2022-01-02 DIAGNOSIS — E78.5 HYPERLIPIDEMIA LDL GOAL <100: ICD-10-CM

## 2022-01-02 RX ORDER — SIMVASTATIN 10 MG
10 TABLET ORAL AT BEDTIME
Qty: 90 TABLET | Refills: 0 | Status: SHIPPED | OUTPATIENT
Start: 2022-01-02 | End: 2022-04-25

## 2022-02-11 ENCOUNTER — TRANSFERRED RECORDS (OUTPATIENT)
Dept: HEALTH INFORMATION MANAGEMENT | Facility: CLINIC | Age: 57
End: 2022-02-11
Payer: COMMERCIAL

## 2022-02-11 LAB — RETINOPATHY: NORMAL

## 2022-02-12 NOTE — PLAN OF CARE
Observation goals:    1.) Serial troponins and stress test complete - No. Troponin negative x 2; stress test tomorrow.  2.) Seen and cleared by consultant if applicable - No.  3.) Adequate pain control on oral analgesia - No complaints of pain.   4.) Vital signs normal or at patient baseline - Yes.  5.) Safe disposition plan has been identified - Home when medically stable and cleared by provider.          BACK PAIN

## 2022-02-20 DIAGNOSIS — E11.9 CONTROLLED TYPE 2 DIABETES MELLITUS WITHOUT COMPLICATION, WITH LONG-TERM CURRENT USE OF INSULIN (H): ICD-10-CM

## 2022-02-20 DIAGNOSIS — Z79.4 CONTROLLED TYPE 2 DIABETES MELLITUS WITHOUT COMPLICATION, WITH LONG-TERM CURRENT USE OF INSULIN (H): ICD-10-CM

## 2022-02-21 RX ORDER — PEN NEEDLE, DIABETIC 32GX 5/32"
NEEDLE, DISPOSABLE MISCELLANEOUS
Qty: 50 EACH | Refills: 0 | Status: SHIPPED | OUTPATIENT
Start: 2022-02-21 | End: 2022-03-21

## 2022-02-21 NOTE — TELEPHONE ENCOUNTER
Patient calling and will be out of pen needles by tomorrow.  Patient scheduled with Dr Jean on 3-11-22. Refill sent to requested pharmacy.

## 2022-02-26 ENCOUNTER — HEALTH MAINTENANCE LETTER (OUTPATIENT)
Age: 57
End: 2022-02-26

## 2022-03-07 ENCOUNTER — DOCUMENTATION ONLY (OUTPATIENT)
Dept: LAB | Facility: CLINIC | Age: 57
End: 2022-03-07
Payer: COMMERCIAL

## 2022-03-07 DIAGNOSIS — E78.5 HYPERLIPIDEMIA LDL GOAL <100: ICD-10-CM

## 2022-03-07 DIAGNOSIS — E11.65 UNCONTROLLED TYPE 2 DIABETES MELLITUS WITH HYPERGLYCEMIA (H): Primary | ICD-10-CM

## 2022-03-07 DIAGNOSIS — Z12.5 SCREENING FOR PROSTATE CANCER: ICD-10-CM

## 2022-03-07 NOTE — PROGRESS NOTES
Stewart LANCE Saraviadanuta has an upcoming lab appointment:    Future Appointments   Date Time Provider Department Center   3/8/2022  2:45 PM [facility]   3/11/2022  2:30 PM Anahi Jean MD [facility]     Patient is scheduled for the following lab(s): see below    There is no order available. Please review and place either future orders or HMPO (Review of Health Maintenance Protocol Orders), as appropriate.    Health Maintenance Due   Topic     ANNUAL REVIEW OF HM ORDERS      MICROALBUMIN      A1C      BMP      LIPID      Gus Leach

## 2022-03-08 ENCOUNTER — LAB (OUTPATIENT)
Dept: LAB | Facility: CLINIC | Age: 57
End: 2022-03-08
Payer: COMMERCIAL

## 2022-03-08 DIAGNOSIS — E11.65 UNCONTROLLED TYPE 2 DIABETES MELLITUS WITH HYPERGLYCEMIA (H): ICD-10-CM

## 2022-03-08 DIAGNOSIS — Z12.5 SCREENING FOR PROSTATE CANCER: ICD-10-CM

## 2022-03-08 DIAGNOSIS — E78.5 HYPERLIPIDEMIA LDL GOAL <100: ICD-10-CM

## 2022-03-08 LAB
CHOLEST SERPL-MCNC: 161 MG/DL
CREAT UR-MCNC: 266 MG/DL
FASTING STATUS PATIENT QL REPORTED: NO
HBA1C MFR BLD: 9.3 % (ref 0–5.6)
HDLC SERPL-MCNC: 34 MG/DL
LDLC SERPL CALC-MCNC: 79 MG/DL
LDLC SERPL CALC-MCNC: ABNORMAL MG/DL
MICROALBUMIN UR-MCNC: 36 MG/L
MICROALBUMIN/CREAT UR: 13.53 MG/G CR (ref 0–17)
NONHDLC SERPL-MCNC: 127 MG/DL
PSA SERPL-MCNC: 0.21 UG/L (ref 0–4)
TRIGL SERPL-MCNC: 434 MG/DL

## 2022-03-08 PROCEDURE — 83721 ASSAY OF BLOOD LIPOPROTEIN: CPT | Mod: 59

## 2022-03-08 PROCEDURE — 36415 COLL VENOUS BLD VENIPUNCTURE: CPT

## 2022-03-08 PROCEDURE — G0103 PSA SCREENING: HCPCS

## 2022-03-08 PROCEDURE — 80061 LIPID PANEL: CPT

## 2022-03-08 PROCEDURE — 82043 UR ALBUMIN QUANTITATIVE: CPT

## 2022-03-08 PROCEDURE — 83036 HEMOGLOBIN GLYCOSYLATED A1C: CPT

## 2022-03-11 ENCOUNTER — OFFICE VISIT (OUTPATIENT)
Dept: FAMILY MEDICINE | Facility: CLINIC | Age: 57
End: 2022-03-11
Payer: COMMERCIAL

## 2022-03-11 VITALS
SYSTOLIC BLOOD PRESSURE: 138 MMHG | WEIGHT: 196 LBS | BODY MASS INDEX: 29.7 KG/M2 | HEART RATE: 83 BPM | OXYGEN SATURATION: 99 % | TEMPERATURE: 98.3 F | RESPIRATION RATE: 16 BRPM | HEIGHT: 68 IN | DIASTOLIC BLOOD PRESSURE: 68 MMHG

## 2022-03-11 DIAGNOSIS — E55.9 VITAMIN D DEFICIENCY: ICD-10-CM

## 2022-03-11 DIAGNOSIS — E78.5 HYPERLIPIDEMIA WITH TARGET LDL LESS THAN 100: ICD-10-CM

## 2022-03-11 DIAGNOSIS — Z12.11 COLON CANCER SCREENING: ICD-10-CM

## 2022-03-11 DIAGNOSIS — I10 HYPERTENSION GOAL BP (BLOOD PRESSURE) < 140/90: ICD-10-CM

## 2022-03-11 DIAGNOSIS — Z23 NEED FOR VACCINATION: ICD-10-CM

## 2022-03-11 DIAGNOSIS — E11.65 UNCONTROLLED TYPE 2 DIABETES MELLITUS WITH HYPERGLYCEMIA (H): Primary | ICD-10-CM

## 2022-03-11 PROCEDURE — 99214 OFFICE O/P EST MOD 30 MIN: CPT | Performed by: FAMILY MEDICINE

## 2022-03-11 PROCEDURE — 99207 PR FOOT EXAM NO CHARGE: CPT | Performed by: FAMILY MEDICINE

## 2022-03-11 ASSESSMENT — PAIN SCALES - GENERAL: PAINLEVEL: NO PAIN (0)

## 2022-03-11 NOTE — PATIENT INSTRUCTIONS
"*   The A1c is up.     *    Will add another medication called Jardiance. Take this medication in the morning.     *    Stay on your other medications.     *    I place an order for a colonoscopy. Our office will be contacting you.     *    Recheck the A1c in 3 months. This would be a \"lab only\" appointment, no need for an office visit. Please call our clinic phone #, or use [facility],  to set this up.    "

## 2022-03-11 NOTE — PROGRESS NOTES
Assessment & Plan     (E11.65) Uncontrolled type 2 diabetes mellitus with hyperglycemia (H)  (primary encounter diagnosis)  Comment: Patient discontinued GLP-1 agonist secondary to GI upset.  Will try SGLT2 inhibitor.  Lab Results   Component Value Date    A1C 9.3 03/08/2022    A1C 8.8 11/02/2021    A1C 9.2 06/15/2021    A1C 8.8 03/08/2021    A1C 7.7 06/22/2020    A1C 8.1 02/14/2020    A1C 7.0 11/15/2019        Plan: FOOT EXAM, empagliflozin (JARDIANCE) 10 MG TABS        tablet        Continue on his other medications advise repeat A1c in 3 months.  Reviewed side effects of the Jardiance including increased urination and potential for genital infections.    (I10) Hypertension goal BP (blood pressure) < 140/90  Comment: Within guidelines.  Plan: Continue current medications.      (E78.5) Hyperlipidemia with target LDL less than 100  Comment: Tolerating moderate intensity statin well.  LDL within guidelines.  LDL Cholesterol Calculated   Date Value Ref Range Status   03/08/2022   Final     Comment:     Cannot estimate LDL when triglyceride exceeds 400 mg/dL   03/08/2021 52 <100 mg/dL Final     Comment:     Desirable:       <100 mg/dl     LDL Cholesterol Direct   Date Value Ref Range Status   03/08/2022 79 <100 mg/dL Final     Comment:     Age 0-19 years:  Desirable: 0-110 mg/dL   Borderline high: 110-129 mg/dL   High: >= 130 mg/dL    Age 20 years and older:  Desirable: <100mg/dL  Above desirable: 100-129 mg/dL   Borderline high: 130-159 mg/dL   High: 160-189 mg/dL   Very high: >= 190 mg/dL      Plan: Continue.    (E55.9) Vitamin D deficiency  Comment: Advised to continue with vitamin D3 supplementation.  Plan: Monitor.    (Z12.11) Colon cancer screening  Plan: Adult Gastro Ref - Procedure Only      No results found for any visits on 03/11/22.     Patient Instructions   *   The A1c is up.     *    Will add another medication called Jardiance. Take this medication in the morning.     *    Stay on your other  "medications.     *    I place an order for a colonoscopy. Our office will be contacting you.     *    Recheck the A1c in 3 months. This would be a \"lab only\" appointment, no need for an office visit. Please call our clinic phone #, or use [facility],  to set this up.       BMI:   Estimated body mass index is 29.8 kg/m  as calculated from the following:    Height as of this encounter: 1.727 m (5' 8\").    Weight as of this encounter: 88.9 kg (196 lb).   Weight management plan: Discussed healthy diet and exercise guidelines        Return in about 3 months (around 6/11/2022) for Lab Work.    Anahi Jean MD  [facility] William is a 56 year old who presents for the following health issues  History of Present Illness       Diabetes:   He presents for follow up of diabetes.  He is checking home blood glucose two times daily. He checks blood glucose before meals and at bedtime.  Blood glucose is sometimes over 200 and sometimes over 70. He is aware of hypoglycemia symptoms including dizziness. He is concerned about blood sugar frequently over 200.  He is having blurry vision. The patient has had a diabetic eye exam in the last 12 months. Eye exam performed on 02/15/2022. Location of last eye exam [facility].        He eats 0-1 servings of fruits and vegetables daily.He consumes 0 sweetened beverage(s) daily.He exercises with enough effort to increase his heart rate 9 or less minutes per day.  He exercises with enough effort to increase his heart rate 3 or less days per week.   He is taking medications regularly.       Review of Systems   CONSTITUTIONAL: NEGATIVE for fever, chills, change in weight  ENT/MOUTH: NEGATIVE for ear, mouth and throat problems  RESP: NEGATIVE for significant cough or SOB  CV: NEGATIVE for chest pain, palpitations or peripheral edema      Objective    /68   Pulse 83   Temp 98.3  F (36.8  C) (Tympanic)   Resp 16   Ht 1.727 m (5' 8\")   Wt 88.9 kg " (196 lb)   SpO2 99%   BMI 29.80 kg/m    Body mass index is 29.8 kg/m .  Physical Exam   GENERAL: Healthy, alert and no distress  EYES: Eyes grossly normal to inspection, conjunctivae and sclerae normal  RESP: Lungs clear to auscultation - no rales, rhonchi or wheezes  CV: Regular rate and rhythm, normal S1 S2, no murmur  MS: No gross musculoskeletal defects noted, no edema  NEURO: Normal strength and tone, mentation intact and speech normal  PSYCH: Mentation appears normal, affect normal/bright  FOOT:       Anahi Jean MD

## 2022-03-11 NOTE — Clinical Note
Please abstract the following data from this visit with this patient into the appropriate field in Epic:    Tests that can be patient reported without a hard copy:    {Quality Abstract List (Optional):320262}    Other Tests found in the patient's chart through Chart Review/Care Everywhere:    Eye exam with ophthalmology on this date: 02/11/22 [facility]    Note to [employer]straction: If this section is blank, no results were found via Chart Review/Care Everywhere.

## 2022-03-18 DIAGNOSIS — Z79.4 CONTROLLED TYPE 2 DIABETES MELLITUS WITHOUT COMPLICATION, WITH LONG-TERM CURRENT USE OF INSULIN (H): ICD-10-CM

## 2022-03-18 DIAGNOSIS — E11.9 CONTROLLED TYPE 2 DIABETES MELLITUS WITHOUT COMPLICATION, WITH LONG-TERM CURRENT USE OF INSULIN (H): ICD-10-CM

## 2022-03-21 RX ORDER — PEN NEEDLE, DIABETIC 32GX 5/32"
NEEDLE, DISPOSABLE MISCELLANEOUS
Qty: 400 EACH | Refills: 1 | Status: SHIPPED | OUTPATIENT
Start: 2022-03-21 | End: 2023-05-05

## 2022-04-22 DIAGNOSIS — E78.5 HYPERLIPIDEMIA LDL GOAL <100: ICD-10-CM

## 2022-04-25 RX ORDER — SIMVASTATIN 10 MG
10 TABLET ORAL AT BEDTIME
Qty: 90 TABLET | Refills: 2 | Status: SHIPPED | OUTPATIENT
Start: 2022-04-25 | End: 2023-06-21

## 2022-04-26 NOTE — TELEPHONE ENCOUNTER
Prescription approved per [facility] Refill Protocol.    Nancy Barragan RN

## 2022-05-14 ENCOUNTER — NURSE TRIAGE (OUTPATIENT)
Dept: NURSING | Facility: CLINIC | Age: 57
End: 2022-05-14
Payer: COMMERCIAL

## 2022-05-14 DIAGNOSIS — Z79.4 CONTROLLED TYPE 2 DIABETES MELLITUS WITHOUT COMPLICATION, WITH LONG-TERM CURRENT USE OF INSULIN (H): ICD-10-CM

## 2022-05-14 DIAGNOSIS — E11.9 CONTROLLED TYPE 2 DIABETES MELLITUS WITHOUT COMPLICATION, WITH LONG-TERM CURRENT USE OF INSULIN (H): ICD-10-CM

## 2022-05-14 RX ORDER — INSULIN LISPRO 100 [IU]/ML
INJECTION, SOLUTION INTRAVENOUS; SUBCUTANEOUS
Qty: 45 ML | Refills: 0 | Status: SHIPPED | OUTPATIENT
Start: 2022-05-14 | End: 2022-11-14

## 2022-05-14 NOTE — TELEPHONE ENCOUNTER
Patient is in [place] and left his medication in [facility].  Patient is diabetic and is taking humalog Kwikpen.  Patient forgot this in [facility].  Patient just picked up a box from [facility].  Pharmacy sent a request to clinic.   Pharmacy in [place] is [facility] in [address] [address] telephone [phone].  Patient states that he just picked up a box last week but left in [place] and pharmacy will only expense in boxes.  FNA paged on call Alina Thomson to phone back FNA at 12:23 pm.  MD phoned back and sent prescription to [facility] in [place]. MD states that patient may have to pay out of pocket.   FNA relayed message to patient and patient agrees.    COVID 19 Nurse Triage Plan/Patient Instructions    Please be aware that novel coronavirus (COVID-19) may be circulating in the community. If you develop symptoms such as fever, cough, or SOB or if you have concerns about the presence of another infection including coronavirus (COVID-19), please contact your health care provider or visit [email or web address].     Disposition/Instructions    Home care recommended. Follow home care protocol based instructions.    Thank you for taking steps to prevent the spread of this virus.  o Limit your contact with others.  o Wear a simple mask to cover your cough.  o Wash your hands well and often.    Resources    [facility]: About COVID-19: [email or web address]    CDC: What to Do If You're Sick: www.cdc.gov/coronavirus/2019-ncov/about/steps-when-sick.html    CDC: Ending Home Isolation: www.cdc.gov/coronavirus/2019-ncov/hcp/disposition-in-home-patients.html     CDC: Caring for Someone: www.cdc.gov/coronavirus/2019-ncov/if-you-are-sick/care-for-someone.html     MD: Interim Guidance for Hospital Discharge to Home: www.health.[facility].[place].us/diseases/coronavirus/hcp/hospdischarge.pdf    [facility] clinical trials (COVID-19 research studies):  [email or web address].[facility]-clinical-trials     Below are the COVID-19 hotlines at the [place] Department of Health ([facility] Interpreters are available.   o For health questions: Call 725-110-5683 or 1-138.749.2259 (7 a.m. to 7 p.m.)  o For questions about schools and childcare: Call 865-749-7843 or 1-582.558.6412 (7 a.m. to 7 p.m.)

## 2022-05-16 DIAGNOSIS — E11.9 CONTROLLED TYPE 2 DIABETES MELLITUS WITHOUT COMPLICATION, WITH LONG-TERM CURRENT USE OF INSULIN (H): ICD-10-CM

## 2022-05-16 DIAGNOSIS — I10 HYPERTENSION GOAL BP (BLOOD PRESSURE) < 140/90: ICD-10-CM

## 2022-05-16 DIAGNOSIS — Z79.4 CONTROLLED TYPE 2 DIABETES MELLITUS WITHOUT COMPLICATION, WITH LONG-TERM CURRENT USE OF INSULIN (H): ICD-10-CM

## 2022-05-16 RX ORDER — INSULIN LISPRO 100 [IU]/ML
INJECTION, SOLUTION INTRAVENOUS; SUBCUTANEOUS
Qty: 45 ML | Refills: 0 | Status: CANCELLED | OUTPATIENT
Start: 2022-05-16

## 2022-05-18 RX ORDER — LOSARTAN POTASSIUM 100 MG/1
100 TABLET ORAL DAILY
Qty: 90 TABLET | Refills: 1 | Status: SHIPPED | OUTPATIENT
Start: 2022-05-18 | End: 2023-03-14

## 2022-06-18 ENCOUNTER — HEALTH MAINTENANCE LETTER (OUTPATIENT)
Age: 57
End: 2022-06-18

## 2022-07-05 ENCOUNTER — ALLIED HEALTH/NURSE VISIT (OUTPATIENT)
Dept: FAMILY MEDICINE | Facility: CLINIC | Age: 57
End: 2022-07-05
Payer: COMMERCIAL

## 2022-07-05 ENCOUNTER — LAB (OUTPATIENT)
Dept: LAB | Facility: CLINIC | Age: 57
End: 2022-07-05

## 2022-07-05 DIAGNOSIS — Z23 ENCOUNTER FOR IMMUNIZATION: Primary | ICD-10-CM

## 2022-07-05 DIAGNOSIS — E11.65 UNCONTROLLED TYPE 2 DIABETES MELLITUS WITH HYPERGLYCEMIA (H): ICD-10-CM

## 2022-07-05 LAB — HBA1C MFR BLD: 8.2 % (ref 0–5.6)

## 2022-07-05 PROCEDURE — 99207 PR NO CHARGE NURSE ONLY: CPT

## 2022-07-05 PROCEDURE — 83036 HEMOGLOBIN GLYCOSYLATED A1C: CPT

## 2022-07-05 PROCEDURE — 36415 COLL VENOUS BLD VENIPUNCTURE: CPT

## 2022-07-05 PROCEDURE — 90471 IMMUNIZATION ADMIN: CPT

## 2022-07-05 PROCEDURE — 90746 HEPB VACCINE 3 DOSE ADULT IM: CPT

## 2022-07-05 NOTE — NURSING NOTE
Prior to immunization administration, verified patients identity using patient s name and date of birth. Please see Immunization Activity for additional information.     Screening Questionnaire for Adult Immunization    Are you sick today?   No   Do you have allergies to medications, food, a vaccine component or latex?   No   Have you ever had a serious reaction after receiving a vaccination?   No   Do you have a long-term health problem with heart, lung, kidney, or metabolic disease (e.g., diabetes), asthma, a blood disorder, no spleen, complement component deficiency, a cochlear implant, or a spinal fluid leak?  Are you on long-term aspirin therapy?   No   Do you have cancer, leukemia, HIV/AIDS, or any other immune system problem?   No   Do you have a parent, brother, or sister with an immune system problem?   No   In the past 3 months, have you taken medications that affect  your immune system, such as prednisone, other steroids, or anticancer drugs; drugs for the treatment of rheumatoid arthritis, Crohn s disease, or psoriasis; or have you had radiation treatments?   No   Have you had a seizure, or a brain or other nervous system problem?   No   During the past year, have you received a transfusion of blood or blood    products, or been given immune (gamma) globulin or antiviral drug?   No   For women: Are you pregnant or is there a chance you could become       pregnant during the next month?   No   Have you received any vaccinations in the past 4 weeks?   No     Immunization questionnaire answers were all negative.        Per orders of Anahi Jean MD , injection of hep b given by Belkis Amos. Patient instructed to remain in clinic for 15 minutes afterwards, and to report any adverse reaction to me immediately.       Screening performed by Belkis Amos on 7/5/2022 at 1:50 PM.

## 2022-07-11 DIAGNOSIS — Z79.4 CONTROLLED TYPE 2 DIABETES MELLITUS WITHOUT COMPLICATION, WITH LONG-TERM CURRENT USE OF INSULIN (H): ICD-10-CM

## 2022-07-11 DIAGNOSIS — E11.9 CONTROLLED TYPE 2 DIABETES MELLITUS WITHOUT COMPLICATION, WITH LONG-TERM CURRENT USE OF INSULIN (H): ICD-10-CM

## 2022-07-11 RX ORDER — BLOOD SUGAR DIAGNOSTIC
STRIP MISCELLANEOUS
Qty: 150 STRIP | Refills: 0 | Status: SHIPPED | OUTPATIENT
Start: 2022-07-11 | End: 2022-09-12

## 2022-07-11 NOTE — TELEPHONE ENCOUNTER
Prescription approved per [employer] Refill Protocol.  Ana RN,BSN  Triage Nurse  [facility]: [facility]  Ph: [phone]

## 2022-08-02 DIAGNOSIS — Z79.4 CONTROLLED TYPE 2 DIABETES MELLITUS WITHOUT COMPLICATION, WITH LONG-TERM CURRENT USE OF INSULIN (H): ICD-10-CM

## 2022-08-02 DIAGNOSIS — E11.9 CONTROLLED TYPE 2 DIABETES MELLITUS WITHOUT COMPLICATION, WITH LONG-TERM CURRENT USE OF INSULIN (H): ICD-10-CM

## 2022-08-02 RX ORDER — FLASH GLUCOSE SENSOR
KIT MISCELLANEOUS
Qty: 2 EACH | Refills: 11 | Status: SHIPPED | OUTPATIENT
Start: 2022-08-02 | End: 2023-03-23

## 2022-08-02 NOTE — TELEPHONE ENCOUNTER
Routing refill request to provider for review/approval because:  Drug not on the [facility] refill protocol     Requested Prescriptions   Pending Prescriptions Disp Refills    Continuous Blood Gluc Sensor (FREESTYLE CINDY 14 DAY SENSOR) MISC [Pharmacy Med Name: FREESTYLE CINDY 14 DAY SENSOR]  3     Sig: APPLY 1 EACH EVERY 14 DAYS CHANGE EVERY 14 DAYS.        There is no refill protocol information for this order            Janki Nava RN   [facility]

## 2022-08-12 ENCOUNTER — TELEPHONE (OUTPATIENT)
Dept: FAMILY MEDICINE | Facility: CLINIC | Age: 57
End: 2022-08-12

## 2022-08-22 NOTE — TELEPHONE ENCOUNTER
I called and spoke to patient, scheduled phone visit for anxiety 8/24/22.   He wonders if he needs any labs?   I see he will be due for A1C in October.   Advised he can discuss follow up plan with Dr. Jean at phone visit.    Patient verbalized understanding of and agreement with plan.      Angelica Chahal RN  [facility]

## 2022-08-24 ENCOUNTER — VIRTUAL VISIT (OUTPATIENT)
Dept: FAMILY MEDICINE | Facility: CLINIC | Age: 57
End: 2022-08-24
Payer: COMMERCIAL

## 2022-08-24 DIAGNOSIS — F34.1 DYSTHYMIA: Primary | ICD-10-CM

## 2022-08-24 PROCEDURE — 99213 OFFICE O/P EST LOW 20 MIN: CPT | Mod: TEL | Performed by: FAMILY MEDICINE

## 2022-08-24 RX ORDER — TRAZODONE HYDROCHLORIDE 50 MG/1
TABLET, FILM COATED ORAL
Qty: 180 TABLET | Refills: 0 | Status: SHIPPED | OUTPATIENT
Start: 2022-08-24 | End: 2022-08-24

## 2022-08-24 RX ORDER — BUPROPION HYDROCHLORIDE 300 MG/1
300 TABLET ORAL EVERY MORNING
Qty: 90 TABLET | Refills: 1 | Status: SHIPPED | OUTPATIENT
Start: 2022-08-24 | End: 2022-12-30

## 2022-08-24 RX ORDER — TRAZODONE HYDROCHLORIDE 50 MG/1
TABLET, FILM COATED ORAL
Qty: 180 TABLET | Refills: 0 | Status: SHIPPED | OUTPATIENT
Start: 2022-08-24 | End: 2022-11-14

## 2022-08-24 RX ORDER — BUPROPION HYDROCHLORIDE 300 MG/1
300 TABLET ORAL EVERY MORNING
Qty: 90 TABLET | Refills: 0 | Status: SHIPPED | OUTPATIENT
Start: 2022-08-24 | End: 2022-08-24

## 2022-08-24 ASSESSMENT — PATIENT HEALTH QUESTIONNAIRE - PHQ9
SUM OF ALL RESPONSES TO PHQ QUESTIONS 1-9: 13
SUM OF ALL RESPONSES TO PHQ QUESTIONS 1-9: 13
10. IF YOU CHECKED OFF ANY PROBLEMS, HOW DIFFICULT HAVE THESE PROBLEMS MADE IT FOR YOU TO DO YOUR WORK, TAKE CARE OF THINGS AT HOME, OR GET ALONG WITH OTHER PEOPLE: SOMEWHAT DIFFICULT

## 2022-08-24 NOTE — PROGRESS NOTES
Stewart is a 56 year old who is being evaluated via a billable telephone visit.      What phone number would you like to be contacted at? [phone]   How would you like to obtain your AVS? Chandrika    Assessment & Plan     (F34.1) Dysthymia  (primary encounter diagnosis)  Comment: Patient is dysthymic symptoms secondary to the unexpected loss of his father.  He notes poor concentration, daytime fatigue, interrupted sleep patterns.  Denies any major depressive symptoms including self-harm or harm to others.  In the past, he had been on Wellbutrin and tolerated this medication well.  We will restart Wellbutrin in the morning, will also add trazodone at night.  Plan: buPROPion (WELLBUTRIN XL) 300 MG 24 hr tablet,         traZODone (DESYREL) 50 MG tablet, DISCONTINUED:        buPROPion (WELLBUTRIN XL) 300 MG 24 hr tablet,         DISCONTINUED: traZODone (DESYREL) 50 MG tablet        Reviewed side effects.  Follow-up in 1 month.  He is to call with any concerns or questions.  See patient instructions.    (E11.65,  Z79.4) Uncontrolled insulin-treated type 2 diabetes mellitus (H)  Comment: Improved with the addition of Jardiance.  Lab Results   Component Value Date    A1C 8.2 07/05/2022    A1C 9.3 03/08/2022    A1C 8.8 11/02/2021    A1C 9.2 06/15/2021    A1C 8.8 03/08/2021    A1C 7.7 06/22/2020    A1C 8.1 02/14/2020    A1C 7.0 11/15/2019        Plan: Focus on lifestyle.  Recheck A1c in October.    Patient Instructions   I'm so sorry to hear about your father.     Let's restart your medication for mood, Wellbutrin or bupropion.  It is taken in the morning and can be more energizing.    For sleep, will try medication called trazodone.  Take 1 to 2 tablets before bedtime.  When you first start this medication, it can cause some morning sleepiness.  This goes away with time.    Our office staff will contact you about setting up a follow-up appointment in 1 month.    It was nice to see your A1c decrease to 8.2.    Please  call, or use [employer], with any questions or concerns.        Depression Screening Follow Up    PHQ 8/24/2022   PHQ-9 Total Score 13   Q9: Thoughts of better off dead/self-harm past 2 weeks Not at all     Last PHQ-9 8/24/2022   1.  Little interest or pleasure in doing things 2   2.  Feeling down, depressed, or hopeless 2   3.  Trouble falling or staying asleep, or sleeping too much 2   4.  Feeling tired or having little energy 2   5.  Poor appetite or overeating 1   6.  Feeling bad about yourself 2   7.  Trouble concentrating 2   8.  Moving slowly or restless 0   Q9: Thoughts of better off dead/self-harm past 2 weeks 0   PHQ-9 Total Score 13   Difficulty at work, home, or with people -       Follow Up Actions Taken  Crisis resource information provided in After Visit Summary  Patient has experienced a recent significant life event.  Patient counseled, no additional follow up at this time.  Follow up recommended: 1 month.         Return in about 1 month (around 9/24/2022) for Depression checkup.    Anahi Jean MD  [facility] William is a 56 year old, presenting for the following health issues:  No chief complaint on file.      History of Present Illness       Reason for visit:  Anxiety    He eats 2-3 servings of fruits and vegetables daily.He consumes 0 sweetened beverage(s) daily.He exercises with enough effort to increase his heart rate 10 to 19 minutes per day.  He exercises with enough effort to increase his heart rate 4 days per week.   He is taking medications regularly.    Today's PHQ-9         PHQ-9 Total Score: 13    PHQ-9 Q9 Thoughts of better off dead/self-harm past 2 weeks :   Not at all    How difficult have these problems made it for you to do your work, take care of things at home, or get along with other people: Somewhat difficult           Review of Systems   CONSTITUTIONAL: NEGATIVE for fever, chills, change in weight  ENT/MOUTH: NEGATIVE for ear, mouth and  throat problems  RESP: NEGATIVE for significant cough or SOB  CV: NEGATIVE for chest pain, palpitations or peripheral edema  PSYCHIATRIC: Notes depressed mood, lack of energy, interrupted sleep.  Still remains functional.      Objective           Vitals:  No vitals were obtained today due to virtual visit.    Physical Exam   healthy, alert and no distress  PSYCH: Alert His affect is normal  RESP: No cough, no audible wheezing, able to talk in full sentences  Remainder of exam unable to be completed due to telephone visits        Phone call duration: 11 minutes    Anahi Jean MD     .  ..

## 2022-08-24 NOTE — PATIENT INSTRUCTIONS
I'm so sorry to hear about your father.     Let's restart your medication for mood, Wellbutrin or bupropion.  It is taken in the morning and can be more energizing.    For sleep, will try medication called trazodone.  Take 1 to 2 tablets before bedtime.  When you first start this medication, it can cause some morning sleepiness.  This goes away with time.    Our office staff will contact you about setting up a follow-up appointment in 1 month.    It was nice to see your A1c decrease to 8.2.      Please call, or use [employer], with any questions or concerns.

## 2022-09-11 DIAGNOSIS — E11.9 CONTROLLED TYPE 2 DIABETES MELLITUS WITHOUT COMPLICATION, WITH LONG-TERM CURRENT USE OF INSULIN (H): ICD-10-CM

## 2022-09-11 DIAGNOSIS — Z79.4 CONTROLLED TYPE 2 DIABETES MELLITUS WITHOUT COMPLICATION, WITH LONG-TERM CURRENT USE OF INSULIN (H): ICD-10-CM

## 2022-09-12 RX ORDER — BLOOD SUGAR DIAGNOSTIC
STRIP MISCELLANEOUS
Qty: 150 STRIP | Refills: 1 | Status: SHIPPED | OUTPATIENT
Start: 2022-09-12 | End: 2022-11-08

## 2022-09-21 DIAGNOSIS — E11.9 CONTROLLED TYPE 2 DIABETES MELLITUS WITHOUT COMPLICATION (H): ICD-10-CM

## 2022-09-21 NOTE — TELEPHONE ENCOUNTER
Future Office Visit:   Next 5 appointments (look out 90 days)    Oct 04, 2022  3:00 PM  (Arrive by 2:40 PM)  Provider Visit with Anahi Jean MD  [facility] [place] ([facility] [place] ) [address]  [place] [address]  [phone]

## 2022-10-04 ENCOUNTER — OFFICE VISIT (OUTPATIENT)
Dept: FAMILY MEDICINE | Facility: CLINIC | Age: 57
End: 2022-10-04
Payer: COMMERCIAL

## 2022-10-04 VITALS
SYSTOLIC BLOOD PRESSURE: 132 MMHG | BODY MASS INDEX: 27.43 KG/M2 | WEIGHT: 181 LBS | TEMPERATURE: 96.9 F | HEART RATE: 75 BPM | RESPIRATION RATE: 18 BRPM | HEIGHT: 68 IN | OXYGEN SATURATION: 98 % | DIASTOLIC BLOOD PRESSURE: 70 MMHG

## 2022-10-04 DIAGNOSIS — Z23 NEED FOR VACCINATION: ICD-10-CM

## 2022-10-04 DIAGNOSIS — E11.9 CONTROLLED TYPE 2 DIABETES MELLITUS WITHOUT COMPLICATION, WITH LONG-TERM CURRENT USE OF INSULIN (H): Primary | ICD-10-CM

## 2022-10-04 DIAGNOSIS — Z23 HIGH PRIORITY FOR 2019-NCOV VACCINE: ICD-10-CM

## 2022-10-04 DIAGNOSIS — Z23 NEED FOR PROPHYLACTIC VACCINATION AND INOCULATION AGAINST INFLUENZA: ICD-10-CM

## 2022-10-04 DIAGNOSIS — Z12.11 COLON CANCER SCREENING: ICD-10-CM

## 2022-10-04 DIAGNOSIS — Z79.4 CONTROLLED TYPE 2 DIABETES MELLITUS WITHOUT COMPLICATION, WITH LONG-TERM CURRENT USE OF INSULIN (H): Primary | ICD-10-CM

## 2022-10-04 DIAGNOSIS — E78.5 HYPERLIPIDEMIA WITH TARGET LDL LESS THAN 100: ICD-10-CM

## 2022-10-04 DIAGNOSIS — F34.1 DYSTHYMIA: ICD-10-CM

## 2022-10-04 LAB — HBA1C MFR BLD: 7.5 % (ref 0–5.6)

## 2022-10-04 PROCEDURE — 90746 HEPB VACCINE 3 DOSE ADULT IM: CPT | Performed by: FAMILY MEDICINE

## 2022-10-04 PROCEDURE — 99214 OFFICE O/P EST MOD 30 MIN: CPT | Mod: 25 | Performed by: FAMILY MEDICINE

## 2022-10-04 PROCEDURE — 80048 BASIC METABOLIC PNL TOTAL CA: CPT | Performed by: FAMILY MEDICINE

## 2022-10-04 PROCEDURE — 83036 HEMOGLOBIN GLYCOSYLATED A1C: CPT | Performed by: FAMILY MEDICINE

## 2022-10-04 PROCEDURE — 0124A COVID-19,PF,PFIZER BOOSTER BIVALENT: CPT | Performed by: FAMILY MEDICINE

## 2022-10-04 PROCEDURE — 91312 COVID-19,PF,PFIZER BOOSTER BIVALENT: CPT | Performed by: FAMILY MEDICINE

## 2022-10-04 PROCEDURE — 36415 COLL VENOUS BLD VENIPUNCTURE: CPT | Performed by: FAMILY MEDICINE

## 2022-10-04 PROCEDURE — 90682 RIV4 VACC RECOMBINANT DNA IM: CPT | Performed by: FAMILY MEDICINE

## 2022-10-04 PROCEDURE — 90471 IMMUNIZATION ADMIN: CPT | Performed by: FAMILY MEDICINE

## 2022-10-04 PROCEDURE — 90472 IMMUNIZATION ADMIN EACH ADD: CPT | Performed by: FAMILY MEDICINE

## 2022-10-04 PROCEDURE — 84443 ASSAY THYROID STIM HORMONE: CPT | Performed by: FAMILY MEDICINE

## 2022-10-04 RX ORDER — ESCITALOPRAM OXALATE 10 MG/1
10 TABLET ORAL DAILY
Qty: 90 TABLET | Refills: 1 | Status: SHIPPED | OUTPATIENT
Start: 2022-10-04 | End: 2022-12-30

## 2022-10-04 ASSESSMENT — PAIN SCALES - GENERAL: PAINLEVEL: NO PAIN (0)

## 2022-10-04 ASSESSMENT — PATIENT HEALTH QUESTIONNAIRE - PHQ9
10. IF YOU CHECKED OFF ANY PROBLEMS, HOW DIFFICULT HAVE THESE PROBLEMS MADE IT FOR YOU TO DO YOUR WORK, TAKE CARE OF THINGS AT HOME, OR GET ALONG WITH OTHER PEOPLE: SOMEWHAT DIFFICULT
SUM OF ALL RESPONSES TO PHQ QUESTIONS 1-9: 5
SUM OF ALL RESPONSES TO PHQ QUESTIONS 1-9: 5

## 2022-10-04 NOTE — NURSING NOTE
Prior to immunization administration, verified patients identity using patient s name and date of birth. Please see Immunization Activity for additional information.     Screening Questionnaire for Adult Immunization    Are you sick today?   No   Do you have allergies to medications, food, a vaccine component or latex?   No   Have you ever had a serious reaction after receiving a vaccination?   No   Do you have a long-term health problem with heart, lung, kidney, or metabolic disease (e.g., diabetes), asthma, a blood disorder, no spleen, complement component deficiency, a cochlear implant, or a spinal fluid leak?  Are you on long-term aspirin therapy?   Yes   Do you have cancer, leukemia, HIV/AIDS, or any other immune system problem?   No   Do you have a parent, brother, or sister with an immune system problem?   No   In the past 3 months, have you taken medications that affect  your immune system, such as prednisone, other steroids, or anticancer drugs; drugs for the treatment of rheumatoid arthritis, Crohn s disease, or psoriasis; or have you had radiation treatments?   No   Have you had a seizure, or a brain or other nervous system problem?   No   During the past year, have you received a transfusion of blood or blood    products, or been given immune (gamma) globulin or antiviral drug?   No   For women: Are you pregnant or is there a chance you could become       pregnant during the next month?   No   Have you received any vaccinations in the past 4 weeks?   No     Immunization questionnaire was positive for at least one answer.  Notified Miranda.        Per orders of Dr. Jean, injection of Pfizer Booster, Flublok, Hep B given by Rosa Bales MA. Patient instructed to remain in clinic for 15 minutes afterwards, and to report any adverse reaction to me immediately.       Screening performed by Rosa Bales MA on 10/4/2022 at 3:52 PM.

## 2022-10-04 NOTE — LETTER
October 14, 2022      Stewart Turcios  [address] [place] [address]        Dear ,    We are writing to inform you of your test results.    Stewart,     Your recent blood tests show that you have normal thyroid and kidney function.  Nothing here to explain any unusual fatigue.  Also, your A1c has improved 7.5.  Please call, or use [employer] with any questions or concerns    Resulted Orders   HEMOGLOBIN A1C   Result Value Ref Range    Hemoglobin A1C 7.5 (H) 0.0 - 5.6 %      Comment:      Normal <5.7%   Prediabetes 5.7-6.4%    Diabetes 6.5% or higher     Note: Adopted from ADA consensus guidelines.   BASIC METABOLIC PANEL   Result Value Ref Range    Sodium 140 133 - 144 mmol/L    Potassium 4.1 3.4 - 5.3 mmol/L    Chloride 106 94 - 109 mmol/L    Carbon Dioxide (CO2) 30 20 - 32 mmol/L    Anion Gap 4 3 - 14 mmol/L    Urea Nitrogen 11 7 - 30 mg/dL    Creatinine 0.89 0.66 - 1.25 mg/dL    Calcium 9.4 8.5 - 10.1 mg/dL    Glucose 113 (H) 70 - 99 mg/dL    GFR Estimate >90 >60 mL/min/1.73m2      Comment:      Effective December 21, 2021 eGFRcr in adults is calculated using the 2021 CKD-EPI creatinine equation which includes age and gender (Marlyn et al., NEJ, DOI: 10.1056/DOKFed8538223)   TSH with free T4 reflex   Result Value Ref Range    TSH 0.94 0.40 - 4.00 mU/L       If you have any questions or concerns, please call the clinic at the number listed above.       Sincerely,      Anahi Jean MD

## 2022-10-04 NOTE — PROGRESS NOTES
Assessment & Plan     (E11.9,  Z79.4) Controlled type 2 diabetes mellitus without complication, with long-term current use of insulin (H)  (primary encounter diagnosis)  Comment: Within guidelines using current regimen.  Normal kidney and thyroid function.  Lab Results   Component Value Date    A1C 7.5 10/04/2022    A1C 8.2 07/05/2022    A1C 9.3 03/08/2022    A1C 8.8 11/02/2021    A1C 9.2 06/15/2021    A1C 8.8 03/08/2021    A1C 7.7 06/22/2020    A1C 8.1 02/14/2020    A1C 7.0 11/15/2019        Plan: BASIC METABOLIC PANEL, HEMOGLOBIN A1C, TSH with        free T4 reflex        Continue.    (F34.1) Dysthymia  Comment: Still notes mild depressive symptoms tolerating Wellbutrin well.  We will add SSRI therapy.  Plan: escitalopram (LEXAPRO) 10 MG tablet        Follow-up in 1 month.    (Z12.11) Colon cancer screening  Plan: Colonoscopy Screening  Referral      (Z23) Need for vaccination  Plan: HEPATITIS B VACCINE,  ADULT  [[id number]]      (Z23) High priority for 2019-nCoV vaccine  Plan: COVID-19,PF,PFIZER BOOSTER BIVALENT 12+Yrs       (Z23) Need for prophylactic vaccination and inoculation against influenza  Plan: INFLUENZA QUAD, RECOMBINANT, P-FREE (RIV4)         (FLUBLOK)      (E78.5) Hyperlipidemia with target LDL less than 100  Comment: Tolerating statin therapy well.  LDL within guidelines.  LDL Cholesterol Calculated   Date Value Ref Range Status   03/08/2022   Final     Comment:     Cannot estimate LDL when triglyceride exceeds 400 mg/dL   03/08/2021 52 <100 mg/dL Final     Comment:     Desirable:       <100 mg/dl     LDL Cholesterol Direct   Date Value Ref Range Status   03/08/2022 79 <100 mg/dL Final     Comment:     Age 0-19 years:  Desirable: 0-110 mg/dL   Borderline high: 110-129 mg/dL   High: >= 130 mg/dL    Age 20 years and older:  Desirable: <100mg/dL  Above desirable: 100-129 mg/dL   Borderline high: 130-159 mg/dL   High: 160-189 mg/dL   Very high: >= 190 mg/dL      Plan: Continue.          Results  "for orders placed or performed in visit on 10/04/22   HEMOGLOBIN A1C     Status: Abnormal   Result Value Ref Range    Hemoglobin A1C 7.5 (H) 0.0 - 5.6 %   BASIC METABOLIC PANEL     Status: Abnormal   Result Value Ref Range    Sodium 140 133 - 144 mmol/L    Potassium 4.1 3.4 - 5.3 mmol/L    Chloride 106 94 - 109 mmol/L    Carbon Dioxide (CO2) 30 20 - 32 mmol/L    Anion Gap 4 3 - 14 mmol/L    Urea Nitrogen 11 7 - 30 mg/dL    Creatinine 0.89 0.66 - 1.25 mg/dL    Calcium 9.4 8.5 - 10.1 mg/dL    Glucose 113 (H) 70 - 99 mg/dL    GFR Estimate >90 >60 mL/min/1.73m2   TSH with free T4 reflex     Status: Normal   Result Value Ref Range    TSH 0.94 0.40 - 4.00 mU/L       Patient Instructions     I think adding a night time short acting insulin at night.     The A1c, a 3 month average of your blood sugars, is better:    Lab Results   Component Value Date    A1C 7.5 10/04/2022    A1C 8.2 07/05/2022    A1C 9.3 03/08/2022    A1C 8.8 11/02/2021    A1C 9.2 06/15/2021    A1C 8.8 03/08/2021    A1C 7.7 06/22/2020    A1C 8.1 02/14/2020    A1C 7.0 11/15/2019      The Jardiance causes more urination.     For anxiety, will try another medication called Lexapro. Stay on your other medications.     Covid booster, flu shot, and second Hepatitis B shot.     Follow up appointment in 6 months.     Our office will contact about setting a colonoscopy: [facility] will call you to coordinate your care as prescribed by the provider.  If you don t hear from a representative within 2 business days, please call [phone].       BMI:   Estimated body mass index is 27.52 kg/m  as calculated from the following:    Height as of this encounter: 1.727 m (5' 8\").    Weight as of this encounter: 82.1 kg (181 lb).   Weight management plan: Discussed healthy diet and exercise guidelines        Return in about 6 months (around 4/4/2023).    Anahi Jean MD  [facility] William is a 56 year old, presenting for " "the following health issues:  Diabetes, Imm/Inj, Imm/Inj (COVID-19 VACCINE), and Imm/Inj (Flu Shot)      History of Present Illness       Diabetes:   He presents for follow up of diabetes.  He is checking home blood glucose two times daily. He checks blood glucose before meals and at bedtime.  Blood glucose is sometimes over 200 and sometimes under 70. He is aware of hypoglycemia symptoms including shakiness and dizziness. He is concerned about blood sugar frequently over 200 and other.  He is not experiencing numbness or burning in feet, excessive thirst, blurry vision, weight changes or redness, sores or blisters on feet.         He eats 0-1 servings of fruits and vegetables daily.He consumes 1 sweetened beverage(s) daily.He exercises with enough effort to increase his heart rate 9 or less minutes per day.  He exercises with enough effort to increase his heart rate 3 or less days per week.   He is taking medications regularly.    Today's PHQ-9         PHQ-9 Total Score: 5    PHQ-9 Q9 Thoughts of better off dead/self-harm past 2 weeks :   Not at all    How difficult have these problems made it for you to do your work, take care of things at home, or get along with other people: Somewhat difficult     Review of Systems   CONSTITUTIONAL: NEGATIVE for fever, chills, change in weight  ENT/MOUTH: NEGATIVE for ear, mouth and throat problems  RESP: NEGATIVE for significant cough or SOB  CV: NEGATIVE for chest pain, palpitations or peripheral edema  PSYCHIATRIC: Notes fatigue, mild depressive symptoms.      Objective    /70   Pulse 75   Temp 96.9  F (36.1  C) (Temporal)   Resp 18   Ht 1.727 m (5' 8\")   Wt 82.1 kg (181 lb)   SpO2 98%   BMI 27.52 kg/m    Body mass index is 27.52 kg/m .  Physical Exam   GENERAL: Healthy, alert and no distress  EYES: Eyes grossly normal to inspection, conjunctivae and sclerae normal  RESP: Lungs clear to auscultation - no rales, rhonchi or wheezes  CV: Regular rate and rhythm, " normal S1 S2, no murmur  MS: No gross musculoskeletal defects noted, no edema  NEURO: Normal strength and tone, mentation intact and speech normal  PSYCH: Mentation appears normal, affect normal/bright     Anahi Jean MD

## 2022-10-04 NOTE — PATIENT INSTRUCTIONS
I think adding a night time short acting insulin at night.     The A1c, a 3 month average of your blood sugars, is better:    Lab Results   Component Value Date    A1C 7.5 10/04/2022    A1C 8.2 07/05/2022    A1C 9.3 03/08/2022    A1C 8.8 11/02/2021    A1C 9.2 06/15/2021    A1C 8.8 03/08/2021    A1C 7.7 06/22/2020    A1C 8.1 02/14/2020    A1C 7.0 11/15/2019      The Jardiance causes more urination.     For anxiety, will try another medication called Lexapro. Stay on your other medications.     Covid booster, flu shot, and second Hepatitis B shot.     Follow up appointment in 6 months.     Our office will contact about setting a colonoscopy: [facility] will call you to coordinate your care as prescribed by the provider.  If you don t hear from a representative within 2 business days, please call [phone].

## 2022-10-05 LAB
ANION GAP SERPL CALCULATED.3IONS-SCNC: 4 MMOL/L (ref 3–14)
BUN SERPL-MCNC: 11 MG/DL (ref 7–30)
CALCIUM SERPL-MCNC: 9.4 MG/DL (ref 8.5–10.1)
CHLORIDE BLD-SCNC: 106 MMOL/L (ref 94–109)
CO2 SERPL-SCNC: 30 MMOL/L (ref 20–32)
CREAT SERPL-MCNC: 0.89 MG/DL (ref 0.66–1.25)
GFR SERPL CREATININE-BSD FRML MDRD: >90 ML/MIN/1.73M2
GLUCOSE BLD-MCNC: 113 MG/DL (ref 70–99)
POTASSIUM BLD-SCNC: 4.1 MMOL/L (ref 3.4–5.3)
SODIUM SERPL-SCNC: 140 MMOL/L (ref 133–144)

## 2022-10-06 LAB — TSH SERPL DL<=0.005 MIU/L-ACNC: 0.94 MU/L (ref 0.4–4)

## 2022-10-11 DIAGNOSIS — E11.65 UNCONTROLLED TYPE 2 DIABETES MELLITUS WITH HYPERGLYCEMIA (H): ICD-10-CM

## 2022-10-12 RX ORDER — EMPAGLIFLOZIN 10 MG/1
TABLET, FILM COATED ORAL
Qty: 90 TABLET | Refills: 1 | Status: SHIPPED | OUTPATIENT
Start: 2022-10-12 | End: 2023-03-27

## 2022-11-14 DIAGNOSIS — F34.1 DYSTHYMIA: ICD-10-CM

## 2022-11-14 RX ORDER — TRAZODONE HYDROCHLORIDE 50 MG/1
TABLET, FILM COATED ORAL
Qty: 180 TABLET | Refills: 0 | Status: SHIPPED | OUTPATIENT
Start: 2022-11-14 | End: 2023-02-13

## 2022-11-18 ENCOUNTER — TELEPHONE (OUTPATIENT)
Dept: FAMILY MEDICINE | Facility: CLINIC | Age: 57
End: 2022-11-18

## 2022-11-18 DIAGNOSIS — E11.9 CONTROLLED TYPE 2 DIABETES MELLITUS WITHOUT COMPLICATION, WITH LONG-TERM CURRENT USE OF INSULIN (H): ICD-10-CM

## 2022-11-18 DIAGNOSIS — Z79.4 CONTROLLED TYPE 2 DIABETES MELLITUS WITHOUT COMPLICATION, WITH LONG-TERM CURRENT USE OF INSULIN (H): ICD-10-CM

## 2022-11-18 DIAGNOSIS — Z71.84 TRAVEL ADVICE ENCOUNTER: Primary | ICD-10-CM

## 2022-11-18 NOTE — TELEPHONE ENCOUNTER
New Medication Request        What medication are you requesting?: antibiotic for travel to [place] for protection from Malaria.    Reason for medication request: antibiotic to take a couple of days prior to travel and a couple of days upon return    Have you taken this medication before?: Yes: need by 11/27 leaving for [place]. Please order this medication as protection from Malaria    Controlled Substance Agreement on file:   CSA -- Patient Level:    CSA: None found at the patient level.         Patient offered an appointment? No     Preferred Pharmacy:   [id number] IN [place], [place] PLEASE  Could we send this information to you in [employer] or would you prefer to receive a phone call?:   Patient would prefer a phone call   Okay to leave a detailed message?: Yes at Home number on file [phone] (home)  2

## 2022-11-21 RX ORDER — ATOVAQUONE AND PROGUANIL HYDROCHLORIDE 250; 100 MG/1; MG/1
1 TABLET, FILM COATED ORAL DAILY
Qty: 23 TABLET | Refills: 0 | Status: SHIPPED | OUTPATIENT
Start: 2022-11-21 | End: 2023-11-29

## 2022-11-21 NOTE — TELEPHONE ENCOUNTER
Can you contact the patient and find out how long he'll be in [place]? I'll need this information to determine how many tablets to prescribe. Thank you.

## 2022-11-21 NOTE — TELEPHONE ENCOUNTER
I called and spoke to patient, advised him of Rx sent and directions (take one tab daily starting 2 days before travel and continue 7 days after return).    Patient verbalized understanding of and agreement with plan.    Angelica Chahal RN  [facility]

## 2022-11-21 NOTE — TELEPHONE ENCOUNTER
I spoke with Stewart.     He will be leaving on Sunday, November 27 and plans to return on Saturday, December 9.     Petra Albarran RN BSN  [facility]

## 2022-12-16 DIAGNOSIS — Z79.4 CONTROLLED TYPE 2 DIABETES MELLITUS WITHOUT COMPLICATION, WITH LONG-TERM CURRENT USE OF INSULIN (H): ICD-10-CM

## 2022-12-16 DIAGNOSIS — E11.9 CONTROLLED TYPE 2 DIABETES MELLITUS WITHOUT COMPLICATION, WITH LONG-TERM CURRENT USE OF INSULIN (H): ICD-10-CM

## 2022-12-16 RX ORDER — INSULIN LISPRO 100 [IU]/ML
INJECTION, SOLUTION INTRAVENOUS; SUBCUTANEOUS
Qty: 15 ML | Refills: 1 | Status: SHIPPED | OUTPATIENT
Start: 2022-12-16 | End: 2023-03-22

## 2022-12-17 ENCOUNTER — HEALTH MAINTENANCE LETTER (OUTPATIENT)
Age: 57
End: 2022-12-17

## 2022-12-20 ENCOUNTER — TELEPHONE (OUTPATIENT)
Dept: GASTROENTEROLOGY | Facility: CLINIC | Age: 57
End: 2022-12-20

## 2022-12-20 NOTE — TELEPHONE ENCOUNTER
Screening Questions    BlueKIND OF PREP RedLOCATION [review exclusion criteria] GreenSEDATION TYPE    N Have you had a positive covid test in the last 2 weeks?   If yes, what date? Schedule out 14 days from symptoms  Y Your able to give consent for your medical care?  Y Are you active on mychart?   HP What insurance do you carry?    LINDA MALDONADO Ordering/Referring Provider:     27.5 BMI [BMI OVER 40-EXTENDED PREP]  BMI OVER 40 NEED PAC EVALUATION FOR [facility]    Respiratory Screening  [If yes to any of the following HOSPITAL setting only]     N      Do you use daily home oxygen?   N      Do you have mod to severe Obstructive Sleep Apnea?  N      Do you have Pulmonary Hypertension? NEED PAC APPT AT [facility]    N      Do you have UNCONTROLLED asthma?      N Have you had a heart or lung transplant?       N Are you currently on dialysis? [If yes, G-PREP & HOSPITAL setting only]   N  Do you have chronic kidney disease? [If yes, G-PREP]  Y Do you have a diagnosis of diabetes?[If yes, G-PREP]    N Have you had a stroke or Transient ischemic attack (TIA - aka  mini stroke ) within 6 months? (If yes, please review exclusion criteria)  N  In the past 6 months, have you had any heart related issues including cardiomyopathy or heart attack?   N  If yes, did it require cardiac stenting or other implantable device?       N Do you have any implantable devices in your body (pacemaker, defib, LVAD)? (If yes, please review exclusion criteria)    N Do you take nitroglycerin?   N If yes, how often?  (If yes, HOSPITAL setting ONLY)  N Are you currently taking any blood thinners?           [IF YES, INFORM PATIENT TO FOLLOW UP W/ ORDERING PROVIDER FOR BRIDGING INSTRUCTIONS]     N  Do you take Phentermine?   Yes-> Hold for 7 days before procedure.  Please consult your prescribing provider if you have questions about holding this  medication.         [FEMALES] are you currently pregnant?       If yes, how many weeks? [Greater than 12 weeks, OR NEEDED]    N Any prescription pain medications taken daily like narcotics? [EXTENDED PREP AND MAC]    N  Any chemical dependencies such as alcohol, street drugs, or methadone? [If yes, MAC]    N Any post-traumatic stress syndrome, severe anxiety or history of psychosis? [If yes, MAC]    N Do you need help transferring? (If NO, please HOSPITAL setting  only)     N  On a regular basis do you go 3-5 days without a bowel movement?[ If yes, EXTENDED PREP.]     Preferred LOCAL Pharmacy for Pre Prescription:       [id number] - [place], [place] - [address] [place] [address]                          Scheduling Details    Stewart Caller:   (Please ask for phone number if not scheduled by patient)    Type of Procedure Scheduled: Lower Endoscopy [Colonoscopy]      STANDARD [facility]-If you answer yes to questions #8, #20, #21 Which Colonoscopy Prep was Sent:   RICH CF PATIENTS & GROEN'S PATIENTS NEEDS EXTENDED PREP    12/30 Date of Procedure:   DELTA Surgeon:    Location:      Sedation Type:     Conscious Sedation- Needs  for 6 hours after the procedure  MAC/General-Needs  for 24 hours after procedure    Y Informed patient they will need an adult          Cannot take any type of public or medical transportation alone    Y Confirmed Nurse will call to complete assessment      Pre-Procedure Covid test to be completed [[facility] PCR Testing Required]    DOUBLE CHECK BMI AND LUANN  NEEDS ADULT -CANNOT TAKE PUBILC OR MEDICAL TRANSPOTATION  COVID TEST FOR [facility] 3-4 DAYS  COIVD TEST FOR [facility] CAN BE HOME     Additional comments:

## 2022-12-22 RX ORDER — BISACODYL 5 MG
TABLET, DELAYED RELEASE (ENTERIC COATED) ORAL
Qty: 4 TABLET | Refills: 0 | Status: SHIPPED | OUTPATIENT
Start: 2022-12-22 | End: 2023-03-22

## 2022-12-30 ENCOUNTER — HOSPITAL ENCOUNTER (OUTPATIENT)
Facility: AMBULATORY SURGERY CENTER | Age: 57
Discharge: HOME | End: 2022-12-30
Attending: INTERNAL MEDICINE | Admitting: INTERNAL MEDICINE
Payer: COMMERCIAL

## 2022-12-30 VITALS
HEART RATE: 70 BPM | OXYGEN SATURATION: 100 % | DIASTOLIC BLOOD PRESSURE: 83 MMHG | RESPIRATION RATE: 16 BRPM | SYSTOLIC BLOOD PRESSURE: 134 MMHG | TEMPERATURE: 97.4 F

## 2022-12-30 DIAGNOSIS — Z08 ENCOUNTER FOR FOLLOW-UP SURVEILLANCE OF COLON CANCER: ICD-10-CM

## 2022-12-30 DIAGNOSIS — Z12.11 COLON CANCER SCREENING: ICD-10-CM

## 2022-12-30 DIAGNOSIS — Z85.038 ENCOUNTER FOR FOLLOW-UP SURVEILLANCE OF COLON CANCER: ICD-10-CM

## 2022-12-30 DIAGNOSIS — Z86.0100 HISTORY OF COLON POLYPS: Primary | ICD-10-CM

## 2022-12-30 LAB
COLONOSCOPY: NORMAL
GLUCOSE BLDC GLUCOMTR-MCNC: 193 MG/DL (ref 70–99)

## 2022-12-30 PROCEDURE — 88305 TISSUE EXAM BY PATHOLOGIST: CPT | Performed by: PATHOLOGY

## 2022-12-30 PROCEDURE — 45385 COLONOSCOPY W/LESION REMOVAL: CPT

## 2022-12-30 PROCEDURE — G8918 PT W/O PREOP ORDER IV AB PRO: HCPCS

## 2022-12-30 PROCEDURE — G8907 PT DOC NO EVENTS ON DISCHARG: HCPCS

## 2022-12-30 PROCEDURE — 45380 COLONOSCOPY AND BIOPSY: CPT | Mod: XS

## 2022-12-30 RX ORDER — NALOXONE HYDROCHLORIDE 0.4 MG/ML
0.4 INJECTION, SOLUTION INTRAMUSCULAR; INTRAVENOUS; SUBCUTANEOUS
Status: DISCONTINUED | OUTPATIENT
Start: 2022-12-30 | End: 2022-12-31 | Stop reason: HOSPADM

## 2022-12-30 RX ORDER — LIDOCAINE 40 MG/G
CREAM TOPICAL
Status: DISCONTINUED | OUTPATIENT
Start: 2022-12-30 | End: 2022-12-31 | Stop reason: HOSPADM

## 2022-12-30 RX ORDER — ONDANSETRON 4 MG/1
4 TABLET, ORALLY DISINTEGRATING ORAL EVERY 6 HOURS PRN
Status: DISCONTINUED | OUTPATIENT
Start: 2022-12-30 | End: 2022-12-31 | Stop reason: HOSPADM

## 2022-12-30 RX ORDER — NALOXONE HYDROCHLORIDE 0.4 MG/ML
0.2 INJECTION, SOLUTION INTRAMUSCULAR; INTRAVENOUS; SUBCUTANEOUS
Status: DISCONTINUED | OUTPATIENT
Start: 2022-12-30 | End: 2022-12-31 | Stop reason: HOSPADM

## 2022-12-30 RX ORDER — ONDANSETRON 2 MG/ML
4 INJECTION INTRAMUSCULAR; INTRAVENOUS EVERY 6 HOURS PRN
Status: DISCONTINUED | OUTPATIENT
Start: 2022-12-30 | End: 2022-12-31 | Stop reason: HOSPADM

## 2022-12-30 RX ORDER — ONDANSETRON 2 MG/ML
4 INJECTION INTRAMUSCULAR; INTRAVENOUS
Status: DISCONTINUED | OUTPATIENT
Start: 2022-12-30 | End: 2022-12-31 | Stop reason: HOSPADM

## 2022-12-30 RX ORDER — PROCHLORPERAZINE MALEATE 10 MG
10 TABLET ORAL EVERY 6 HOURS PRN
Status: DISCONTINUED | OUTPATIENT
Start: 2022-12-30 | End: 2022-12-31 | Stop reason: HOSPADM

## 2022-12-30 RX ORDER — FENTANYL CITRATE 50 UG/ML
INJECTION, SOLUTION INTRAMUSCULAR; INTRAVENOUS PRN
Status: DISCONTINUED | OUTPATIENT
Start: 2022-12-30 | End: 2022-12-30 | Stop reason: HOSPADM

## 2022-12-30 RX ORDER — FLUMAZENIL 0.1 MG/ML
0.2 INJECTION, SOLUTION INTRAVENOUS
Status: ACTIVE | OUTPATIENT
Start: 2022-12-30 | End: 2022-12-30

## 2022-12-30 NOTE — H&P
[facility] Anesthesia Pre-op History and Physical    Stewart Turcios MRN# [id number]   Age: 57 year old YOB: 1965            Date of Exam 12/30/2022         Primary care provider: Anahi Jean         Chief Complaint and/or Reason for Procedure:     History of advanced adenomas         Active problem list:     Patient Active Problem List    Diagnosis Date Noted     Uncontrolled insulin-treated type 2 diabetes mellitus 05/30/2017     Priority: Medium     Hypertension goal BP (blood pressure) < 140/90 12/07/2015     Priority: Medium     Benign prostatic hypertrophy with urinary frequency 10/28/2015     Priority: Medium     October 28, 2015: Doing well with Flomax 0.4 MG.       Vitamin D deficiency 10/28/2015     Priority: Medium     October 28, 2015: Patient takes 2000 MG of Vitamin D. Will check labs today.       Hyperlipidemia with target LDL less than 100 09/29/2015     Priority: Medium     Diagnosis updated by automated process. Provider to review and confirm.       Erectile dysfunction 04/11/2013     Priority: Medium     October 28, 2015: Does well with Cialis 5 MG prn, no significant contraindications. Denies significant drops in BP or other side effects.       LATENT TB, LUNG - NOT ACTIVE/NOT CONTAGIOUS 12/30/2012     Priority: Medium            Medications (include herbals and vitamins):   Any Plavix use in the last 7 days? No     Current Outpatient Medications   Medication Sig     bisacodyl (DULCOLAX) 5 MG EC tablet Take 2 tablets at 3 pm the day before your procedure. If your procedure is before 11 am, take 2 additional tablets at 11 pm. If your procedure is after 11 am, take 2 additional tablets at 6 am. For additional instructions refer to your colonoscopy prep instructions.     insulin glargine (LANTUS SOLOSTAR) 100 UNIT/ML pen Inject 27 Units Subcutaneous At Bedtime     Multiple Vitamins-Minerals (MULTIVITAMIN OR)      polyethylene glycol (GOLYTELY) 236 g suspension The  night before the exam at 6 pm drink an 8-ounce glass every 15 minutes until the jug is half empty. If you arrive before 11 AM: Drink the other half of the Golytely jug at 11 PM night before procedure. If you arrive after 11 AM: Drink the other half of the Golytely jug at 6 AM day of procedure. For additional instructions refer to your colonoscopy prep instructions.     sildenafil (REVATIO) 20 MG tablet TAKE 1 TABLET (20 MG) BY MOUTH ONCE AS NEEDED     tadalafil (CIALIS) 10 MG tablet Take 1 tablet (10 mg) by mouth daily as needed     traZODone (DESYREL) 50 MG tablet TAKE 1-2 TABLETS BY MOUTH AT BEDTIME AS NEEDED SLEEP.     ACCU-CHEK GUIDE test strip USE TO TEST BLOOD SUGAR 5 TIMES DAILY OR AS DIRECTED     ASPIRIN 81 MG OR TABS 1 tab po QD (Once per day)     blood glucose monitoring (ACCU-CHEK FASTCLIX) lancets Use to test blood sugar 5 times daily or as directed.  Ok to substitute alternative if insurance prefers.     Cholecalciferol (VITAMIN D3 PO) Take 2,000 Units by mouth daily     Continuous Blood Gluc  (DEXCOM G6 ) NICOLE Use to read blood sugars as per 's instructions.     Continuous Blood Gluc Sensor (FREESTYLE CINDY 14 DAY SENSOR) MISC APPLY 1 EACH EVERY 14 DAYS CHANGE EVERY 14 DAYS.     insulin lispro (HUMALOG KWIKPEN) 100 UNIT/ML (1 unit dial) KWIKPEN INJECT 10 UNITS SUBCUTANEOUS 2 TIMES DAILY (BEFORE MEALS) + SLIDING SCALE. TDD ~30 UNIT(S)     insulin pen needle (BD PEN NEEDLE MUSA 2ND GEN) 32G X 4 MM miscellaneous Use 4 pen needles daily     JARDIANCE 10 MG TABS tablet TAKE 1 TABLET (10 MG) BY MOUTH DAILY FOR DIABETES.     losartan (COZAAR) 100 MG tablet Take 1 tablet (100 mg) by mouth daily     metFORMIN (GLUCOPHAGE) 500 MG tablet TAKE 2 TABLETS BY MOUTH 2 TIMES DAILY WITH MEALS     methocarbamol (ROBAXIN) 500 MG tablet Take 1 tablet (500 mg) by mouth 3 times daily as needed for muscle spasms     simvastatin (ZOCOR) 10 MG tablet TAKE 1 TABLET (10 MG) BY MOUTH AT BEDTIME FOR  CHOLESTEROL.     Current Facility-Administered Medications   Medication     lidocaine (LMX4) kit     lidocaine 1 % 0.1-1 mL     ondansetron (ZOFRAN) injection 4 mg     sodium chloride (PF) 0.9% PF flush 3 mL     sodium chloride (PF) 0.9% PF flush 3 mL             Allergies:      Allergies   Allergen Reactions     No Known Allergies      Allergy to Latex? No  Allergy to tape?   No  Intolerances:             Physical Exam:   All vitals have been reviewed  Patient Vitals for the past 8 hrs:   BP Temp Temp src Pulse Resp SpO2   12/30/22 0922 (!) 157/78 97.4  F (36.3  C) Temporal 69 18 98 %     No intake/output data recorded.  Lungs:   No increased work of breathing, good air exchange, clear to auscultation bilaterally, no crackles or wheezing     Cardiovascular:   normal S1 and S2             Lab / Radiology Results:            Anesthetic risk and/or ASA classification:     2  Blanca Cuevas, DO

## 2023-01-03 LAB
PATH REPORT.COMMENTS IMP SPEC: NORMAL
PATH REPORT.COMMENTS IMP SPEC: NORMAL
PATH REPORT.FINAL DX SPEC: NORMAL
PATH REPORT.GROSS SPEC: NORMAL
PATH REPORT.MICROSCOPIC SPEC OTHER STN: NORMAL
PATH REPORT.RELEVANT HX SPEC: NORMAL
PHOTO IMAGE: NORMAL

## 2023-01-13 ENCOUNTER — TRANSFERRED RECORDS (OUTPATIENT)
Dept: HEALTH INFORMATION MANAGEMENT | Facility: CLINIC | Age: 58
End: 2023-01-13

## 2023-01-13 LAB — RETINOPATHY: NEGATIVE

## 2023-02-11 DIAGNOSIS — F34.1 DYSTHYMIA: ICD-10-CM

## 2023-02-13 RX ORDER — TRAZODONE HYDROCHLORIDE 50 MG/1
TABLET, FILM COATED ORAL
Qty: 180 TABLET | Refills: 0 | Status: SHIPPED | OUTPATIENT
Start: 2023-02-13 | End: 2023-03-22

## 2023-02-19 DIAGNOSIS — F34.1 DYSTHYMIA: ICD-10-CM

## 2023-02-20 NOTE — TELEPHONE ENCOUNTER
Patient was seen 8/24/22 by Dr. Jean, plan:    F34.1) Dysthymia  (primary encounter diagnosis)  Comment: Patient is dysthymic symptoms secondary to the unexpected loss of his father.  He notes poor concentration, daytime fatigue, interrupted sleep patterns.  Denies any major depressive symptoms including self-harm or harm to others.  In the past, he had been on Wellbutrin and tolerated this medication well.  We will restart Wellbutrin in the morning, will also add trazodone at night.  Plan: buPROPion (WELLBUTRIN XL) 300 MG 24 hr tablet,         traZODone (DESYREL) 50 MG tablet, DISCONTINUED:        buPROPion (WELLBUTRIN XL) 300 MG 24 hr tablet,         DISCONTINUED: traZODone (DESYREL) 50 MG tablet        Reviewed side effects.  Follow-up in 1 month.  He is to call with any concerns or questions.  See patient instructions.      Was seen 10/4/22 by Dr. Jean, further plan:    F34.1) Dysthymia  Comment: Still notes mild depressive symptoms tolerating Wellbutrin well.  We will add SSRI therapy.  Plan: escitalopram (LEXAPRO) 10 MG tablet        Follow-up in 1 month.    I don't see either wellbutrin or escitalopram on med list now.  Looks like he saw gastroenterology on 12/30/22.      Attempted to call patient at home/mobile number, no answer, left message on voicemail; patient was instructed to return call to [facility] at [phone].  Is he taking the wellbutrin requested by pharmacy?    Angelica Chahal RN  [facility]

## 2023-02-20 NOTE — TELEPHONE ENCOUNTER
Medication was discontinued 12/30/22. Nurse does not see documentation of why.    Please verify with patient if they have been taking this.    Thank you,  Danii Reyna RN

## 2023-02-21 NOTE — TELEPHONE ENCOUNTER
Called [phone] (home). Did they answer the phone: No, left a message on voicemail to return call to the [facility]3] at [phone], and to ask for any available triage nurse.    Ana RN  Triage Nurse  [facility]: [facility]3]

## 2023-02-22 RX ORDER — BUPROPION HYDROCHLORIDE 300 MG/1
300 TABLET ORAL EVERY MORNING
Qty: 90 TABLET | Refills: 1 | OUTPATIENT
Start: 2023-02-22

## 2023-02-22 NOTE — TELEPHONE ENCOUNTER
Patient stated that he is still taking this medication, and he has enough meds, and does not need a refill.    Routed to PCP to see if we should add Bupropion back onto med list.    Ana GUEVARA BSN  Triage Nurse  [facility]: [facility]  Ph: [phone]

## 2023-03-15 ENCOUNTER — LAB (OUTPATIENT)
Dept: LAB | Facility: CLINIC | Age: 58
End: 2023-03-15
Payer: COMMERCIAL

## 2023-03-15 DIAGNOSIS — Z13.220 SCREENING FOR HYPERLIPIDEMIA: Primary | ICD-10-CM

## 2023-03-15 LAB — HBA1C MFR BLD: 8.5 % (ref 0–5.6)

## 2023-03-15 PROCEDURE — 82043 UR ALBUMIN QUANTITATIVE: CPT

## 2023-03-15 PROCEDURE — 80061 LIPID PANEL: CPT

## 2023-03-15 PROCEDURE — 36415 COLL VENOUS BLD VENIPUNCTURE: CPT

## 2023-03-15 PROCEDURE — 83036 HEMOGLOBIN GLYCOSYLATED A1C: CPT

## 2023-03-15 PROCEDURE — 82570 ASSAY OF URINE CREATININE: CPT

## 2023-03-16 LAB
CHOLEST SERPL-MCNC: 192 MG/DL
CREAT UR-MCNC: 76 MG/DL
FASTING STATUS PATIENT QL REPORTED: ABNORMAL
HDLC SERPL-MCNC: 45 MG/DL
LDLC SERPL CALC-MCNC: 82 MG/DL
MICROALBUMIN UR-MCNC: <5 MG/L
MICROALBUMIN/CREAT UR: NORMAL MG/G{CREAT}
NONHDLC SERPL-MCNC: 147 MG/DL
TRIGL SERPL-MCNC: 325 MG/DL

## 2023-03-22 ENCOUNTER — OFFICE VISIT (OUTPATIENT)
Dept: FAMILY MEDICINE | Facility: CLINIC | Age: 58
End: 2023-03-22
Payer: COMMERCIAL

## 2023-03-22 VITALS
RESPIRATION RATE: 16 BRPM | WEIGHT: 188.2 LBS | BODY MASS INDEX: 28.62 KG/M2 | TEMPERATURE: 97.8 F | DIASTOLIC BLOOD PRESSURE: 68 MMHG | HEART RATE: 88 BPM | OXYGEN SATURATION: 98 % | SYSTOLIC BLOOD PRESSURE: 128 MMHG

## 2023-03-22 DIAGNOSIS — E11.9 CONTROLLED TYPE 2 DIABETES MELLITUS WITHOUT COMPLICATION, WITH LONG-TERM CURRENT USE OF INSULIN (H): Primary | ICD-10-CM

## 2023-03-22 DIAGNOSIS — Z79.4 CONTROLLED TYPE 2 DIABETES MELLITUS WITHOUT COMPLICATION, WITH LONG-TERM CURRENT USE OF INSULIN (H): Primary | ICD-10-CM

## 2023-03-22 DIAGNOSIS — E78.5 HYPERLIPIDEMIA WITH TARGET LDL LESS THAN 100: ICD-10-CM

## 2023-03-22 DIAGNOSIS — I10 HYPERTENSION GOAL BP (BLOOD PRESSURE) < 140/90: ICD-10-CM

## 2023-03-22 PROCEDURE — 99214 OFFICE O/P EST MOD 30 MIN: CPT | Performed by: FAMILY MEDICINE

## 2023-03-22 ASSESSMENT — PAIN SCALES - GENERAL: PAINLEVEL: NO PAIN (0)

## 2023-03-22 NOTE — PROGRESS NOTES
"  Assessment & Plan     (E11.9,  Z79.4) Controlled type 2 diabetes mellitus without complication, with long-term current use of insulin (H)  (primary encounter diagnosis)  Comment: A1c now above recommended guidelines.  Patient is on insulin.  We will add GLP-1 agonist to see if this will help with hyperglycemic control.  Lab Results   Component Value Date    A1C 8.5 03/15/2023    A1C 7.5 10/04/2022    A1C 8.2 07/05/2022    A1C 9.3 03/08/2022    A1C 8.8 11/02/2021    A1C 9.2 06/15/2021    A1C 8.8 03/08/2021    A1C 7.7 06/22/2020    A1C 8.1 02/14/2020    A1C 7.0 11/15/2019        Plan: semaglutide (OZEMPIC) 2 MG/1.5ML SOPN pen,         Hemoglobin A1c        Continue other diabetic medications.  Recheck A1c in 3 months.  Discussed side effects.    (E78.5) Hyperlipidemia with target LDL less than 100  Comment: Tolerating moderate intensity statin therapy well.  Plan: LDL within guidelines, continue.    (I10) Hypertension goal BP (blood pressure) < 140/90  Comment: Within guidelines.  Patient on ARB therapy.  Excellent kidney function, no signs of microalbuminuria.  Plan: Continue current management.    Patient Instructions   For the blood sugars, try Ozempic.    Stay on the other medications.    Recheck the a1c in 3 months. This would be a \"lab only\" appointment, no need for an office visit. Please call our clinic phone # to set this up.         Anahi Jean MD  [facility] PRISCILLA William is a 57 year old, presenting for the following health issues:  Diabetes  No flowsheet data found.  History of Present Illness       Diabetes:   He presents for follow up of diabetes.  He is checking home blood glucose two times daily. He checks blood glucose before meals.  Blood glucose is sometimes over 200 and never under 70. He is aware of hypoglycemia symptoms including weakness. He is concerned about blood sugar frequently over 200.  He is having blurry vision.         He eats 0-1 servings of " fruits and vegetables daily.He consumes 0 sweetened beverage(s) daily.He exercises with enough effort to increase his heart rate 10 to 19 minutes per day.  He exercises with enough effort to increase his heart rate 3 or less days per week.   He is taking medications regularly.           BP Readings from Last 2 Encounters:   03/22/23 128/68   12/30/22 134/83     Hemoglobin A1C (%)   Date Value   03/15/2023 8.5 (H)   10/04/2022 7.5 (H)   06/15/2021 9.2 (H)   03/08/2021 8.8 (H)     LDL Cholesterol Calculated   Date Value   03/15/2023 82 mg/dL   03/08/2022      Comment:     Cannot estimate LDL when triglyceride exceeds 400 mg/dL   03/08/2021 52 mg/dL   06/22/2020 47 mg/dL     LDL Cholesterol Direct (mg/dL)   Date Value   03/08/2022 79             Review of Systems   Constitutional, HEENT, cardiovascular, pulmonary, gi and gu systems are negative, except as otherwise noted.      Objective    /68   Pulse 88   Temp 97.8  F (36.6  C) (Tympanic)   Resp 16   Wt 85.4 kg (188 lb 3.2 oz)   SpO2 98%   BMI 28.62 kg/m    Body mass index is 28.62 kg/m .  Physical Exam   GENERAL: Healthy, alert and no distress  EYES: Eyes grossly normal to inspection, conjunctivae and sclerae normal  RESP: Lungs clear to auscultation - no rales, rhonchi or wheezes  CV: Regular rate and rhythm, normal S1 S2, no murmur  MS: No gross musculoskeletal defects noted, no edema  NEURO: Normal strength and tone, mentation intact and speech normal  PSYCH: Mentation appears normal, affect normal/bright     Anahi Jean MD

## 2023-03-22 NOTE — PATIENT INSTRUCTIONS
"For the blood sugars, try Ozempic.    Stay on the other medications.    Recheck the a1c in 3 months. This would be a \"lab only\" appointment, no need for an office visit. Please call our clinic phone # to set this up.        "

## 2023-03-23 DIAGNOSIS — E11.9 CONTROLLED TYPE 2 DIABETES MELLITUS WITHOUT COMPLICATION (H): ICD-10-CM

## 2023-03-23 RX ORDER — FLASH GLUCOSE SENSOR
KIT MISCELLANEOUS
Qty: 2 EACH | Refills: 11 | Status: SHIPPED | OUTPATIENT
Start: 2023-03-23

## 2023-03-25 DIAGNOSIS — F34.1 DYSTHYMIA: ICD-10-CM

## 2023-03-25 DIAGNOSIS — E11.65 UNCONTROLLED TYPE 2 DIABETES MELLITUS WITH HYPERGLYCEMIA (H): ICD-10-CM

## 2023-03-27 RX ORDER — ESCITALOPRAM OXALATE 10 MG/1
10 TABLET ORAL DAILY
Qty: 90 TABLET | Refills: 1 | OUTPATIENT
Start: 2023-03-27

## 2023-03-27 RX ORDER — EMPAGLIFLOZIN 10 MG/1
TABLET, FILM COATED ORAL
Qty: 90 TABLET | Refills: 0 | Status: SHIPPED | OUTPATIENT
Start: 2023-03-27 | End: 2023-06-30

## 2023-03-27 RX ORDER — BUPROPION HYDROCHLORIDE 300 MG/1
300 TABLET ORAL EVERY MORNING
Qty: 90 TABLET | Refills: 1 | Status: SHIPPED | OUTPATIENT
Start: 2023-03-27 | End: 2023-09-25

## 2023-03-27 NOTE — TELEPHONE ENCOUNTER
Called pt to confirm that he is still taking bupropion and no longer taking Lexapro.    Gayatri Chapman RN

## 2023-03-27 NOTE — TELEPHONE ENCOUNTER
Please confirm if patient is still taking escitalopram 10 mg and bupropion 300 mg XL. These were both discontinued 12/30/2022 by Myla Booker RN.    Edwina Corona RN   [facility]

## 2023-04-21 DIAGNOSIS — Z79.4 CONTROLLED TYPE 2 DIABETES MELLITUS WITHOUT COMPLICATION, WITH LONG-TERM CURRENT USE OF INSULIN (H): ICD-10-CM

## 2023-04-21 DIAGNOSIS — E11.9 CONTROLLED TYPE 2 DIABETES MELLITUS WITHOUT COMPLICATION, WITH LONG-TERM CURRENT USE OF INSULIN (H): ICD-10-CM

## 2023-04-21 RX ORDER — BLOOD SUGAR DIAGNOSTIC
STRIP MISCELLANEOUS
Qty: 150 STRIP | Refills: 2 | Status: SHIPPED | OUTPATIENT
Start: 2023-04-21 | End: 2024-05-31

## 2023-05-05 DIAGNOSIS — E11.9 CONTROLLED TYPE 2 DIABETES MELLITUS WITHOUT COMPLICATION, WITH LONG-TERM CURRENT USE OF INSULIN (H): ICD-10-CM

## 2023-05-05 DIAGNOSIS — Z79.4 CONTROLLED TYPE 2 DIABETES MELLITUS WITHOUT COMPLICATION, WITH LONG-TERM CURRENT USE OF INSULIN (H): ICD-10-CM

## 2023-05-05 RX ORDER — PEN NEEDLE, DIABETIC 32GX 5/32"
NEEDLE, DISPOSABLE MISCELLANEOUS
Qty: 100 EACH | Refills: 7 | Status: SHIPPED | OUTPATIENT
Start: 2023-05-05 | End: 2024-06-07

## 2023-06-25 ENCOUNTER — HEALTH MAINTENANCE LETTER (OUTPATIENT)
Age: 58
End: 2023-06-25

## 2023-06-30 DIAGNOSIS — E11.65 UNCONTROLLED TYPE 2 DIABETES MELLITUS WITH HYPERGLYCEMIA (H): ICD-10-CM

## 2023-06-30 RX ORDER — EMPAGLIFLOZIN 10 MG/1
TABLET, FILM COATED ORAL
Qty: 90 TABLET | Refills: 0 | Status: SHIPPED | OUTPATIENT
Start: 2023-06-30 | End: 2023-09-25

## 2023-07-03 DIAGNOSIS — Z79.4 CONTROLLED TYPE 2 DIABETES MELLITUS WITHOUT COMPLICATION, WITH LONG-TERM CURRENT USE OF INSULIN (H): ICD-10-CM

## 2023-07-03 DIAGNOSIS — E11.9 CONTROLLED TYPE 2 DIABETES MELLITUS WITHOUT COMPLICATION, WITH LONG-TERM CURRENT USE OF INSULIN (H): ICD-10-CM

## 2023-07-03 RX ORDER — INSULIN GLARGINE 100 [IU]/ML
27 INJECTION, SOLUTION SUBCUTANEOUS AT BEDTIME
Qty: 30 ML | Refills: 3 | Status: SHIPPED | OUTPATIENT
Start: 2023-07-03 | End: 2023-09-01

## 2023-07-06 DIAGNOSIS — Z79.4 CONTROLLED TYPE 2 DIABETES MELLITUS WITHOUT COMPLICATION, WITH LONG-TERM CURRENT USE OF INSULIN (H): ICD-10-CM

## 2023-07-06 DIAGNOSIS — E11.9 CONTROLLED TYPE 2 DIABETES MELLITUS WITHOUT COMPLICATION, WITH LONG-TERM CURRENT USE OF INSULIN (H): ICD-10-CM

## 2023-07-06 RX ORDER — INSULIN LISPRO 100 [IU]/ML
INJECTION, SOLUTION INTRAVENOUS; SUBCUTANEOUS
Qty: 15 ML | Refills: 1 | Status: SHIPPED | OUTPATIENT
Start: 2023-07-06 | End: 2023-09-01

## 2023-07-16 DIAGNOSIS — Z79.4 CONTROLLED TYPE 2 DIABETES MELLITUS WITHOUT COMPLICATION, WITH LONG-TERM CURRENT USE OF INSULIN (H): ICD-10-CM

## 2023-07-16 DIAGNOSIS — E11.9 CONTROLLED TYPE 2 DIABETES MELLITUS WITHOUT COMPLICATION, WITH LONG-TERM CURRENT USE OF INSULIN (H): ICD-10-CM

## 2023-07-16 DIAGNOSIS — I10 HYPERTENSION GOAL BP (BLOOD PRESSURE) < 140/90: ICD-10-CM

## 2023-07-17 RX ORDER — SEMAGLUTIDE 1.34 MG/ML
0.5 INJECTION, SOLUTION SUBCUTANEOUS
Qty: 6 ML | Refills: 1 | Status: SHIPPED | OUTPATIENT
Start: 2023-07-17 | End: 2024-08-13

## 2023-07-17 RX ORDER — LOSARTAN POTASSIUM 100 MG/1
100 TABLET ORAL DAILY
Qty: 90 TABLET | Refills: 1 | Status: SHIPPED | OUTPATIENT
Start: 2023-07-17 | End: 2024-01-15

## 2023-07-18 DIAGNOSIS — Z79.4 CONTROLLED TYPE 2 DIABETES MELLITUS WITHOUT COMPLICATION, WITH LONG-TERM CURRENT USE OF INSULIN (H): ICD-10-CM

## 2023-07-18 DIAGNOSIS — E11.9 CONTROLLED TYPE 2 DIABETES MELLITUS WITHOUT COMPLICATION, WITH LONG-TERM CURRENT USE OF INSULIN (H): ICD-10-CM

## 2023-07-18 RX ORDER — SEMAGLUTIDE 1.34 MG/ML
0.5 INJECTION, SOLUTION SUBCUTANEOUS
Qty: 6 ML | Refills: 1 | OUTPATIENT
Start: 2023-07-18

## 2023-07-18 NOTE — TELEPHONE ENCOUNTER
ALTERNATIVE REQUESTED, THIS NDC HAS BEEN DISCONTINUED, PLEASE SEND NEW RX, OZEMPIC .25MG 0.5 MG COMES IN A 3ML PEN ONLY.  [facility]

## 2023-07-27 ENCOUNTER — LAB (OUTPATIENT)
Dept: LAB | Facility: CLINIC | Age: 58
End: 2023-07-27
Payer: COMMERCIAL

## 2023-07-27 DIAGNOSIS — E11.9 CONTROLLED TYPE 2 DIABETES MELLITUS WITHOUT COMPLICATION, WITH LONG-TERM CURRENT USE OF INSULIN (H): ICD-10-CM

## 2023-07-27 DIAGNOSIS — Z79.4 CONTROLLED TYPE 2 DIABETES MELLITUS WITHOUT COMPLICATION, WITH LONG-TERM CURRENT USE OF INSULIN (H): ICD-10-CM

## 2023-07-27 DIAGNOSIS — E11.65 UNCONTROLLED TYPE 2 DIABETES MELLITUS WITH HYPERGLYCEMIA (H): Primary | ICD-10-CM

## 2023-07-27 LAB — HBA1C MFR BLD: 8.7 % (ref 0–5.6)

## 2023-07-27 PROCEDURE — 83036 HEMOGLOBIN GLYCOSYLATED A1C: CPT

## 2023-07-27 PROCEDURE — 36415 COLL VENOUS BLD VENIPUNCTURE: CPT

## 2023-09-01 DIAGNOSIS — Z79.4 CONTROLLED TYPE 2 DIABETES MELLITUS WITHOUT COMPLICATION, WITH LONG-TERM CURRENT USE OF INSULIN (H): ICD-10-CM

## 2023-09-01 DIAGNOSIS — E11.9 CONTROLLED TYPE 2 DIABETES MELLITUS WITHOUT COMPLICATION, WITH LONG-TERM CURRENT USE OF INSULIN (H): ICD-10-CM

## 2023-09-01 RX ORDER — INSULIN GLARGINE 100 [IU]/ML
27 INJECTION, SOLUTION SUBCUTANEOUS AT BEDTIME
Qty: 30 ML | Refills: 0 | Status: SHIPPED | OUTPATIENT
Start: 2023-09-01 | End: 2023-11-21

## 2023-09-01 RX ORDER — INSULIN LISPRO 100 [IU]/ML
INJECTION, SOLUTION INTRAVENOUS; SUBCUTANEOUS
Qty: 15 ML | Refills: 0 | Status: SHIPPED | OUTPATIENT
Start: 2023-09-01 | End: 2023-11-21

## 2023-09-23 DIAGNOSIS — F34.1 DYSTHYMIA: ICD-10-CM

## 2023-09-24 DIAGNOSIS — E11.9 CONTROLLED TYPE 2 DIABETES MELLITUS WITHOUT COMPLICATION (H): ICD-10-CM

## 2023-09-24 DIAGNOSIS — E11.65 UNCONTROLLED TYPE 2 DIABETES MELLITUS WITH HYPERGLYCEMIA (H): ICD-10-CM

## 2023-09-25 DIAGNOSIS — E78.5 HYPERLIPIDEMIA LDL GOAL <100: ICD-10-CM

## 2023-09-25 RX ORDER — EMPAGLIFLOZIN 10 MG/1
TABLET, FILM COATED ORAL
Qty: 90 TABLET | Refills: 1 | Status: SHIPPED | OUTPATIENT
Start: 2023-09-25 | End: 2024-04-15

## 2023-09-25 RX ORDER — BUPROPION HYDROCHLORIDE 300 MG/1
300 TABLET ORAL EVERY MORNING
Qty: 90 TABLET | Refills: 1 | Status: SHIPPED | OUTPATIENT
Start: 2023-09-25 | End: 2024-08-13

## 2023-09-25 RX ORDER — SIMVASTATIN 10 MG
10 TABLET ORAL AT BEDTIME
Qty: 90 TABLET | Refills: 0 | Status: SHIPPED | OUTPATIENT
Start: 2023-09-25 | End: 2023-12-26

## 2023-10-20 ENCOUNTER — TELEPHONE (OUTPATIENT)
Dept: FAMILY MEDICINE | Facility: CLINIC | Age: 58
End: 2023-10-20
Payer: COMMERCIAL

## 2023-10-20 NOTE — TELEPHONE ENCOUNTER
Reason for Call:  Appointment Request    Patient requesting this type of appt:  Preventive and referral for colonoscopy    Requested provider: Anahi Jean    Reason patient unable to be scheduled: Wants to be seen before end of the year    When does patient want to be seen/preferred time:  before end of the year    Comments: Stewart called to scheduled AWV and wants to be seen by Dr. Jean before the next available time slot of February because he wants to get his A1C checked, and get a referral for a colonoscopy.  He would like a phone call back.     Could we send this information to you in [employer] or would you prefer to receive a phone call?:   Patient would prefer a phone call   Okay to leave a detailed message?: Yes at Cell number on file:    Telephone Information:   Mobile [phone]       Call taken on 10/20/2023 at 11:14 AM by Marialuisa Valle

## 2023-11-01 ENCOUNTER — LAB (OUTPATIENT)
Dept: LAB | Facility: CLINIC | Age: 58
End: 2023-11-01
Payer: COMMERCIAL

## 2023-11-01 DIAGNOSIS — E11.65 UNCONTROLLED TYPE 2 DIABETES MELLITUS WITH HYPERGLYCEMIA (H): ICD-10-CM

## 2023-11-01 LAB — HBA1C MFR BLD: 9 % (ref 0–5.6)

## 2023-11-01 PROCEDURE — 83036 HEMOGLOBIN GLYCOSYLATED A1C: CPT

## 2023-11-01 PROCEDURE — 36415 COLL VENOUS BLD VENIPUNCTURE: CPT

## 2023-11-17 DIAGNOSIS — E11.9 CONTROLLED TYPE 2 DIABETES MELLITUS WITHOUT COMPLICATION, WITH LONG-TERM CURRENT USE OF INSULIN (H): ICD-10-CM

## 2023-11-17 DIAGNOSIS — Z79.4 CONTROLLED TYPE 2 DIABETES MELLITUS WITHOUT COMPLICATION, WITH LONG-TERM CURRENT USE OF INSULIN (H): ICD-10-CM

## 2023-11-21 RX ORDER — INSULIN GLARGINE 100 [IU]/ML
27 INJECTION, SOLUTION SUBCUTANEOUS AT BEDTIME
Qty: 15 ML | Refills: 0 | Status: SHIPPED | OUTPATIENT
Start: 2023-11-21 | End: 2024-01-15

## 2023-11-21 RX ORDER — INSULIN LISPRO 100 [IU]/ML
INJECTION, SOLUTION INTRAVENOUS; SUBCUTANEOUS
Qty: 15 ML | Refills: 0 | Status: SHIPPED | OUTPATIENT
Start: 2023-11-21 | End: 2024-01-15

## 2023-11-29 DIAGNOSIS — J20.9 ACUTE BRONCHITIS: ICD-10-CM

## 2023-11-29 DIAGNOSIS — Z71.84 TRAVEL ADVICE ENCOUNTER: ICD-10-CM

## 2023-11-29 RX ORDER — ATOVAQUONE AND PROGUANIL HYDROCHLORIDE 250; 100 MG/1; MG/1
1 TABLET, FILM COATED ORAL DAILY
Qty: 23 TABLET | Refills: 0 | Status: SHIPPED | OUTPATIENT
Start: 2023-11-29 | End: 2024-02-04

## 2023-11-29 RX ORDER — AZITHROMYCIN 250 MG/1
TABLET, FILM COATED ORAL
Qty: 6 TABLET | Refills: 0 | Status: SHIPPED | OUTPATIENT
Start: 2023-11-29 | End: 2023-12-04

## 2023-11-29 NOTE — TELEPHONE ENCOUNTER
Routing to BE providers.    Patient traveling to [place] on 12/7 and is requesting pended medications.    Appears Dr. Blanco has prescribed in the past.    Pended if agreeable.      RN received juliette from patient regarding above.    Waqar Lam, RN, BSN, PHN  [facility]

## 2023-11-29 NOTE — TELEPHONE ENCOUNTER
Reviewed. RX's have been provided in past for travel to [place].     Please ensure that Malarone RX covers entire trip as recommended on the RX and let me know if I need to update the quantity.     Request approved.    Sadie Hernandez PA-C on 11/29/2023 at 12:15 PM

## 2023-11-30 ENCOUNTER — ALLIED HEALTH/NURSE VISIT (OUTPATIENT)
Dept: FAMILY MEDICINE | Facility: CLINIC | Age: 58
End: 2023-11-30
Payer: COMMERCIAL

## 2023-11-30 DIAGNOSIS — Z23 ENCOUNTER FOR IMMUNIZATION: Primary | ICD-10-CM

## 2023-11-30 PROCEDURE — 99207 PR NO CHARGE NURSE ONLY: CPT

## 2023-11-30 PROCEDURE — 91320 SARSCV2 VAC 30MCG TRS-SUC IM: CPT

## 2023-11-30 PROCEDURE — 90480 ADMN SARSCOV2 VAC 1/ONLY CMP: CPT

## 2023-11-30 NOTE — TELEPHONE ENCOUNTER
I called and spoke to patient, his trip will last 10 days so the 23 tabs of malarone will be fine.    Patient verbalized understanding of and agreement with plan.    Angelica MULTANI RN  [facility] Triage

## 2023-12-01 ENCOUNTER — TELEPHONE (OUTPATIENT)
Dept: FAMILY MEDICINE | Facility: CLINIC | Age: 58
End: 2023-12-01

## 2023-12-01 DIAGNOSIS — E11.9 CONTROLLED TYPE 2 DIABETES MELLITUS WITHOUT COMPLICATION, WITH LONG-TERM CURRENT USE OF INSULIN (H): ICD-10-CM

## 2023-12-01 DIAGNOSIS — Z79.4 CONTROLLED TYPE 2 DIABETES MELLITUS WITHOUT COMPLICATION, WITH LONG-TERM CURRENT USE OF INSULIN (H): ICD-10-CM

## 2023-12-01 RX ORDER — INSULIN LISPRO 100 [IU]/ML
INJECTION, SOLUTION INTRAVENOUS; SUBCUTANEOUS
Qty: 15 ML | Refills: 0 | Status: CANCELLED | OUTPATIENT
Start: 2023-12-01

## 2023-12-01 NOTE — TELEPHONE ENCOUNTER
PA Initiation    Medication: HUMALOG KWIKPEN 100 UNIT/ML SC SOPN  Insurance Company: [employer] - Phone [phone] Fax [phone]  Pharmacy Filling the Rx: [id number] - [place], [place] - [address] [place] [address] [address]  Filling Pharmacy Phone: [phone]  Filling Pharmacy Fax:    Start Date: 12/1/2023  Central Prior Authorization Team   Phone: [phone]

## 2023-12-01 NOTE — TELEPHONE ENCOUNTER
PRIOR AUTHORIZATION DENIED  This was denied because the insurance company is not aware of a widespread shortage on the generic insulin.  The patient will have to call around to locate a store that has the generic.  No exception will be made to receive the brand name.    Medication: HUMALOG KWIKPEN 100 UNIT/ML SC SOPN  Insurance Company: [employer] - Phone [phone] Fax [phone]  Denial Date: 12/1/2023  Denial Rational:     Appeal Information:   Pharmacy will notify patient.

## 2023-12-01 NOTE — TELEPHONE ENCOUNTER
Lispro is out of stock - insurance is refusing to cover Humalog brand - patient is leaving country to [place] for 4 days    Patient is needing prior authorization RUBEN De La Torre RN  [facility]

## 2023-12-26 DIAGNOSIS — E78.5 HYPERLIPIDEMIA LDL GOAL <100: ICD-10-CM

## 2023-12-26 RX ORDER — SIMVASTATIN 10 MG
10 TABLET ORAL AT BEDTIME
Qty: 90 TABLET | Refills: 0 | Status: SHIPPED | OUTPATIENT
Start: 2023-12-26 | End: 2024-02-02

## 2023-12-27 ENCOUNTER — TELEPHONE (OUTPATIENT)
Dept: GASTROENTEROLOGY | Facility: CLINIC | Age: 58
End: 2023-12-27
Payer: COMMERCIAL

## 2023-12-27 NOTE — TELEPHONE ENCOUNTER
"Endoscopy Scheduling Screen    Have you had a positive Covid test in the last 14 days?  No    Are you active on MyChart?   Yes    What insurance is in the chart?  Other:      Ordering/Referring Provider:     ALEX SORENSON      (If ordering provider performs procedure, schedule with ordering provider unless otherwise instructed. )    BMI: Estimated body mass index is 28.62 kg/m  as calculated from the following:    Height as of 10/4/22: 1.727 m (5' 8\").    Weight as of 3/22/23: 85.4 kg (188 lb 3.2 oz).     Sedation Ordered  moderate sedation.   If patient BMI > 50 do not schedule in [facility].    If patient BMI > 45 do not schedule at [facility].    Are you taking methadone or Suboxone?  No    Are you taking any prescription medications for pain 3 or more times per week?   NO - No RN review required.    Do you have a history of malignant hyperthermia or adverse reaction to anesthesia?  No    (Females) Are you currently pregnant?   No     Have you been diagnosed or told you have pulmonary hypertension?   No    Do you have an LVAD?  No    Have you been told you have moderate to severe sleep apnea?  No    Have you been told you have COPD, asthma, or any other lung disease?  No    Do you have any heart conditions?  No     Have you ever had an organ transplant?   No    Have you ever had or are you awaiting a heart or lung transplant?   No    Have you had a stroke or transient ischemic attack (TIA aka \"mini stroke\" in the last 6 months?   No    Have you been diagnosed with or been told you have cirrhosis of the liver?   No    Are you currently on dialysis?   No    Do you need assistance transferring?   No    BMI: Estimated body mass index is 28.62 kg/m  as calculated from the following:    Height as of 10/4/22: 1.727 m (5' 8\").    Weight as of 3/22/23: 85.4 kg (188 lb 3.2 oz).     Is patients BMI > 40 and scheduling location [facility]?  No    Do you take an injectable medication for weight loss or diabetes (excluding " insulin)?  No    Do you take the medication Naltrexone?  No    Do you take blood thinners?  No       Prep   Are you currently on dialysis or do you have chronic kidney disease?  No    Do you have a diagnosis of diabetes?  Yes (Golytely Prep)    Do you have a diagnosis of cystic fibrosis (CF)?  No    On a regular basis do you go 3 -5 days between bowel movements?  No    BMI > 40?  No    Preferred Pharmacy:      [facility]/pharmacy #[id number] - [place], [place] - [address]  [address]  [place] [address]  Phone: [phone] Fax: [phone]      Final Scheduling Details   Colonoscopy prep sent?  Standard Golytely    Procedure scheduled  Colonoscopy    Surgeon:  DELTA     Date of procedure:  03/12/2024     Pre-OP / PAC:   No - Not required for this site.    Location  [facility] - [facility] - Per order.    Sedation   Moderate Sedation - Per order.      Patient Reminders:   You will receive a call from a Nurse to review instructions and health history.  This assessment must be completed prior to your procedure.  Failure to complete the Nurse assessment may result in the procedure being cancelled.      On the day of your procedure, please designate an adult(s) who can drive you home stay with you for the next 24 hours. The medicines used in the exam will make you sleepy. You will not be able to drive.      You cannot take public transportation, ride share services, or non-medical taxi service without a responsible caregiver.  Medical transport services are allowed with the requirement that a responsible caregiver will receive you at your destination.  We require that drivers and caregivers are confirmed prior to your procedure.

## 2024-01-13 DIAGNOSIS — E11.9 CONTROLLED TYPE 2 DIABETES MELLITUS WITHOUT COMPLICATION, WITH LONG-TERM CURRENT USE OF INSULIN (H): ICD-10-CM

## 2024-01-13 DIAGNOSIS — Z79.4 CONTROLLED TYPE 2 DIABETES MELLITUS WITHOUT COMPLICATION, WITH LONG-TERM CURRENT USE OF INSULIN (H): ICD-10-CM

## 2024-01-15 RX ORDER — INSULIN GLARGINE 100 [IU]/ML
27 INJECTION, SOLUTION SUBCUTANEOUS AT BEDTIME
Qty: 15 ML | Refills: 0 | Status: SHIPPED | OUTPATIENT
Start: 2024-01-15 | End: 2024-03-21

## 2024-01-19 DIAGNOSIS — N52.2 DRUG-INDUCED ERECTILE DYSFUNCTION: ICD-10-CM

## 2024-01-19 RX ORDER — SILDENAFIL CITRATE 20 MG/1
20 TABLET ORAL
Qty: 90 TABLET | Refills: 3 | Status: SHIPPED | OUTPATIENT
Start: 2024-01-19 | End: 2024-08-13

## 2024-01-19 NOTE — TELEPHONE ENCOUNTER
Dr. Jean,       Patient called with request to have his sildenafil increased to 50 mg from the current 20 mg dose and is also needing refill on medication.     Patient scheduled for annual wellness in Feb and asked about lab tests. I told him not due for A1C until May 2024 and only pending lab in care gap is BMP. Asking if you want him to have this done before visit?    Thanks,  PAOLA Deutsch  [facility]

## 2024-01-21 ENCOUNTER — HEALTH MAINTENANCE LETTER (OUTPATIENT)
Age: 59
End: 2024-01-21

## 2024-02-02 ENCOUNTER — OFFICE VISIT (OUTPATIENT)
Dept: FAMILY MEDICINE | Facility: CLINIC | Age: 59
End: 2024-02-02
Payer: COMMERCIAL

## 2024-02-02 VITALS
WEIGHT: 185.6 LBS | HEART RATE: 89 BPM | OXYGEN SATURATION: 100 % | RESPIRATION RATE: 18 BRPM | TEMPERATURE: 98.1 F | SYSTOLIC BLOOD PRESSURE: 136 MMHG | BODY MASS INDEX: 28.13 KG/M2 | DIASTOLIC BLOOD PRESSURE: 86 MMHG | HEIGHT: 68 IN

## 2024-02-02 DIAGNOSIS — R07.9 CHEST PAIN, UNSPECIFIED TYPE: ICD-10-CM

## 2024-02-02 DIAGNOSIS — Z79.4 CONTROLLED TYPE 2 DIABETES MELLITUS WITHOUT COMPLICATION, WITH LONG-TERM CURRENT USE OF INSULIN (H): ICD-10-CM

## 2024-02-02 DIAGNOSIS — R39.15 URINARY URGENCY: ICD-10-CM

## 2024-02-02 DIAGNOSIS — Z12.11 SCREEN FOR COLON CANCER: ICD-10-CM

## 2024-02-02 DIAGNOSIS — N52.2 DRUG-INDUCED ERECTILE DYSFUNCTION: ICD-10-CM

## 2024-02-02 DIAGNOSIS — Z00.00 ROUTINE HISTORY AND PHYSICAL EXAMINATION OF ADULT: Primary | ICD-10-CM

## 2024-02-02 DIAGNOSIS — E11.9 CONTROLLED TYPE 2 DIABETES MELLITUS WITHOUT COMPLICATION, WITH LONG-TERM CURRENT USE OF INSULIN (H): ICD-10-CM

## 2024-02-02 DIAGNOSIS — E78.5 HYPERLIPIDEMIA LDL GOAL <100: ICD-10-CM

## 2024-02-02 DIAGNOSIS — Z12.5 SCREENING FOR PROSTATE CANCER: ICD-10-CM

## 2024-02-02 DIAGNOSIS — I10 HYPERTENSION GOAL BP (BLOOD PRESSURE) < 140/90: ICD-10-CM

## 2024-02-02 LAB — HBA1C MFR BLD: 8.6 % (ref 0–5.6)

## 2024-02-02 PROCEDURE — G0103 PSA SCREENING: HCPCS | Performed by: FAMILY MEDICINE

## 2024-02-02 PROCEDURE — 80061 LIPID PANEL: CPT | Performed by: FAMILY MEDICINE

## 2024-02-02 PROCEDURE — 84443 ASSAY THYROID STIM HORMONE: CPT | Performed by: FAMILY MEDICINE

## 2024-02-02 PROCEDURE — 36415 COLL VENOUS BLD VENIPUNCTURE: CPT | Performed by: FAMILY MEDICINE

## 2024-02-02 PROCEDURE — 99396 PREV VISIT EST AGE 40-64: CPT | Performed by: FAMILY MEDICINE

## 2024-02-02 PROCEDURE — 99214 OFFICE O/P EST MOD 30 MIN: CPT | Mod: 25 | Performed by: FAMILY MEDICINE

## 2024-02-02 PROCEDURE — 80053 COMPREHEN METABOLIC PANEL: CPT | Performed by: FAMILY MEDICINE

## 2024-02-02 PROCEDURE — 83036 HEMOGLOBIN GLYCOSYLATED A1C: CPT | Performed by: FAMILY MEDICINE

## 2024-02-02 RX ORDER — SIMVASTATIN 10 MG
10 TABLET ORAL AT BEDTIME
Qty: 90 TABLET | Refills: 3 | Status: SHIPPED | OUTPATIENT
Start: 2024-02-02

## 2024-02-02 RX ORDER — SILDENAFIL 100 MG/1
100 TABLET, FILM COATED ORAL DAILY PRN
Qty: 30 TABLET | Refills: 3 | Status: SHIPPED | OUTPATIENT
Start: 2024-02-02 | End: 2024-08-13

## 2024-02-02 RX ORDER — SEMAGLUTIDE 1.34 MG/ML
0.5 INJECTION, SOLUTION SUBCUTANEOUS
Qty: 6 ML | Refills: 1 | Status: CANCELLED | OUTPATIENT
Start: 2024-02-02

## 2024-02-02 SDOH — HEALTH STABILITY: PHYSICAL HEALTH: ON AVERAGE, HOW MANY DAYS PER WEEK DO YOU ENGAGE IN MODERATE TO STRENUOUS EXERCISE (LIKE A BRISK WALK)?: 2 DAYS

## 2024-02-02 ASSESSMENT — PATIENT HEALTH QUESTIONNAIRE - PHQ9
SUM OF ALL RESPONSES TO PHQ QUESTIONS 1-9: 0
SUM OF ALL RESPONSES TO PHQ QUESTIONS 1-9: 0
10. IF YOU CHECKED OFF ANY PROBLEMS, HOW DIFFICULT HAVE THESE PROBLEMS MADE IT FOR YOU TO DO YOUR WORK, TAKE CARE OF THINGS AT HOME, OR GET ALONG WITH OTHER PEOPLE: NOT DIFFICULT AT ALL

## 2024-02-02 ASSESSMENT — SOCIAL DETERMINANTS OF HEALTH (SDOH): HOW OFTEN DO YOU GET TOGETHER WITH FRIENDS OR RELATIVES?: MORE THAN THREE TIMES A WEEK

## 2024-02-02 NOTE — PROGRESS NOTES
Preventive Care Visit  [facility] [place] Jean MD, Family Medicine  Feb 2, 2024    Assessment & Plan     (Z00.00) Routine history and physical examination of adult  (primary encounter diagnosis)  Comment:  Reviewed the need for periodic healthcare exams and screenings.   Plan: REVIEW OF HEALTH MAINTENANCE PROTOCOL ORDERS        Advised yearly physicals.     (E11.9,  Z79.4) Controlled type 2 diabetes mellitus without complication, with long-term current use of insulin (H)  Comment: Elevated A1c's for the past 3 years.   Lab Results   Component Value Date    A1C 8.6 02/02/2024    A1C 9.0 11/01/2023    A1C 8.7 07/27/2023    A1C 8.5 03/15/2023    A1C 7.5 10/04/2022    A1C 9.2 06/15/2021    A1C 8.8 03/08/2021    A1C 7.7 06/22/2020    A1C 8.1 02/14/2020    A1C 7.0 11/15/2019      Plan: HEMOGLOBIN A1C, TSH with free T4 reflex,         Semaglutide, 1 MG/DOSE, (OZEMPIC) 4 MG/3ML pen,        Echocardiogram Exercise Stress        Will increase Lantus by 10% weekly morning sugars are consistently under 150.  Will also increase Ozempic from 0.5 mg to 1 mg weekly.  Stressed diet including regular exercise, decrease alcohol intake.  Follow-up in 3 months to recheck A1c.      (E78.5) Hyperlipidemia LDL goal <100  Comment:   Within guidelines using low intensity statin therapy.  Plan: simvastatin (ZOCOR) 10 MG tablet, Lipid panel         reflex to direct LDL Fasting        Continue.    (I10) Hypertension goal BP (blood pressure) < 140/90  Comment: Within guidelines using single drug therapy, ARB.  Very good kidney function.  Plan: Comprehensive metabolic panel (BMP + Alb, Alk         Phos, ALT, AST, Total. Bili, TP)        Continue.    (Z12.11) Screen for colon cance  Plan: Colonoscopy Screening  Referral            (N52.2) Drug-induced erectile dysfunction  Comment: No contraindications.  Will increase the dose of sildenafil.  Plan: sildenafil (VIAGRA) 100 MG tablet        Reviewed side  effects.    (Z12.5) Screening for prostate cancer  Comment: Reassuringly low PSA.  Plan: PSA, screen        Repeat yearly until age 70.    (R07.9) Chest pain, unspecified type  Comment: Patient notes atypical chest pains, generally nonexertional, some exertional dyspnea.  Given his 3-year history of uncontrolled diabetes, will refer for stress test.  Patient appears stable, will pursue outpatient workup.  Plan: Echocardiogram Exercise Stress            (R39.15) Urinary urgency  Comment: May be secondary to Jardiance plus uncontrolled diabetes.  Consider BPH.  Plan: For now, monitor.  Continue on current medications.  Stressed importance of controlling sugars and decreasing caffeine intake.     Results for orders placed or performed in visit on 02/02/24   HEMOGLOBIN A1C     Status: Abnormal   Result Value Ref Range    Hemoglobin A1C 8.6 (H) 0.0 - 5.6 %   Lipid panel reflex to direct LDL Fasting     Status: None   Result Value Ref Range    Cholesterol 157 <200 mg/dL    Triglycerides 112 <150 mg/dL    Direct Measure HDL 40 >=40 mg/dL    LDL Cholesterol Calculated 95 <=100 mg/dL    Non HDL Cholesterol 117 <130 mg/dL    Patient Fasting > 8hrs? Yes     Narrative    Cholesterol  Desirable:  <200 mg/dL    Triglycerides  Normal:  Less than 150 mg/dL  Borderline High:  150-199 mg/dL  High:  200-499 mg/dL  Very High:  Greater than or equal to 500 mg/dL    Direct Measure HDL  Female:  Greater than or equal to 50 mg/dL   Male:  Greater than or equal to 40 mg/dL    LDL Cholesterol  Desirable:  <100mg/dL  Above Desirable:  100-129 mg/dL   Borderline High:  130-159 mg/dL   High:  160-189 mg/dL   Very High:  >= 190 mg/dL    Non HDL Cholesterol  Desirable:  130 mg/dL  Above Desirable:  130-159 mg/dL  Borderline High:  160-189 mg/dL  High:  190-219 mg/dL  Very High:  Greater than or equal to 220 mg/dL   Comprehensive metabolic panel (BMP + Alb, Alk Phos, ALT, AST, Total. Bili, TP)     Status: Normal   Result Value Ref Range    Sodium  "139 135 - 145 mmol/L    Potassium 3.6 3.4 - 5.3 mmol/L    Carbon Dioxide (CO2) 27 22 - 29 mmol/L    Anion Gap 10 7 - 15 mmol/L    Urea Nitrogen 8.5 8.0 - 23.0 mg/dL    Creatinine 1.00 0.67 - 1.17 mg/dL    GFR Estimate 87 >60 mL/min/1.73m2    Calcium 9.5 8.6 - 10.0 mg/dL    Chloride 102 98 - 107 mmol/L    Glucose 80 70 - 99 mg/dL    Alkaline Phosphatase 45 40 - 150 U/L    AST 21 0 - 45 U/L    ALT 11 0 - 70 U/L    Protein Total 7.2 6.4 - 8.3 g/dL    Albumin 4.5 3.5 - 5.2 g/dL    Bilirubin Total 0.6 <=1.2 mg/dL   TSH with free T4 reflex     Status: Normal   Result Value Ref Range    TSH 2.51 0.30 - 4.20 uIU/mL   PSA, screen     Status: Normal   Result Value Ref Range    Prostate Specific Antigen Screen 0.19 0.00 - 3.50 ng/mL    Narrative    This result is obtained using the Roche Elecsys total PSA method on the katiuska e801 immunoassay analyzer. Results obtained with different assay methods or kits cannot be used interchangeably.        Patient Instructions   I placed an order for a colonoscopy: [facility] will call you to coordinate your care as prescribed by the provider. If you don t hear from a representative within 2 business days, please call [phone].     Blood tests today.     Increase the Lantus insulin by 3 units daily every week until the sugars improve.     Will increase dose of Ozempic to 1.0 mg weekly.     Recheck A1c in 3 months. This would be a \"lab only\" appointment, no need for an office visit. Please call our clinic phone # to set this up.      Cut down on coffee, try decaffeinate coffee.     The Jardiance can also make pee more.     Our office will call about setting a stress test. Let me know if they don't contact you.     More exercise.     Come back in 3 months.          BMI  Estimated body mass index is 28.22 kg/m  as calculated from the following:    Height as of this encounter: 1.727 m (5' 8\").    Weight as of this encounter: 84.2 kg (185 lb 9.6 oz).   Weight management plan: " Discussed healthy diet and exercise guidelines    Counseling  Appropriate preventive services were discussed with this patient, including applicable screening as appropriate for fall prevention, nutrition, physical activity, Tobacco-use cessation, weight loss and cognition.  Checklist reviewing preventive services available has been given to the patient.  Reviewed patient's diet, addressing concerns and/or questions.   He is at risk for lack of exercise and has been provided with information to increase physical activity for the benefit of his well-being.   The patient was instructed to see the dentist every 6 months.   He is at risk for psychosocial distress and has been provided with information to reduce risk.   The patient reports drinking more than one alcoholic drink per day and sometimes engages in binge or excessive drinking. The patient was counseled and given information about possible harmful effects of excessive alcohol intake as well as where to get help for alcohol problems.   Patient has been advised of split billing requirements and indicates understanding: Yes        Paty William is a 58 year old, presenting for the following:  Physical        2/2/2024     2:44 PM   Additional Questions   Roomed by Giselle   Accompanied by na         2/2/2024     2:44 PM   Patient Reported Additional Medications   Patient reports taking the following new medications none        Health Care Directive  Patient does not have a Health Care Directive or Living Will: Discussed advance care planning with patient; information given to patient to review.    Healthy Habits:     Taking medications regularly:  0  History of Present Illness       Diabetes:   He presents for follow up of diabetes.  He is checking home blood glucose two times daily.   He checks blood glucose before meals and at bedtime.  Blood glucose is sometimes over 200 and never under 70. He is aware of hypoglycemia symptoms including shakiness,  dizziness and weakness.    He has no concerns regarding his diabetes at this time.   He is not experiencing numbness or burning in feet, excessive thirst, blurry vision, weight changes or redness, sores or blisters on feet. The patient has not had a diabetic eye exam in the last 12 months.          Hyperlipidemia:  He presents for follow up of hyperlipidemia.   He is taking medication to lower cholesterol. He is not having myalgia or other side effects to statin medications.    Hypertension: He presents for follow up of hypertension.  He does not check blood pressure  regularly outside of the clinic.  He does not follow a low salt diet.     He eats 0-1 servings of fruits and vegetables daily.He consumes 0 sweetened beverage(s) daily.He exercises with enough effort to increase his heart rate 10 to 19 minutes per day.  He exercises with enough effort to increase his heart rate 5 days per week.   He is taking medications regularly.          2/2/2024   General Health   How would you rate your overall physical health? Good   Feel stress (tense, anxious, or unable to sleep) Only a little   (!) STRESS CONCERN      2/2/2024   Nutrition   Three or more servings of calcium each day? (!) NO   Diet: Regular (no restrictions)   How many servings of fruit and vegetables per day? (!) 0-1   How many sweetened beverages each day? 0-1         2/2/2024   Exercise   Days per week of moderate/strenous exercise 2 days   (!) EXERCISE CONCERN      2/2/2024   Social Factors   Frequency of gathering with friends or relatives More than three times a week   Worry food won't last until get money to buy more No   Food not last or not have enough money for food? No   Do you have housing?  Yes   Are you worried about losing your housing? No   Lack of transportation? No   Unable to get utilities (heat,electricity)? No         2/2/2024   Fall Risk   Fallen 2 or more times in the past year? No   Trouble with walking or balance? No          2/2/2024    Dental   Dentist two times every year? (!) NO          No data to display                Today's PHQ-9 Score:       2/2/2024     2:40 PM   PHQ-9 SCORE   PHQ-9 Total Score MyChart 0   PHQ-9 Total Score 0         2/2/2024   Substance Use   Alcohol more than 3/day or more than 7/wk Yes   How often do you have a drink containing alcohol 2 to 3 times a week   How many alcohol drinks on typical day 3 or 4   How often do you have 5+ drinks at one occasion Less than monthly   Audit 2/3 Score 2   How often not able to stop drinking once started Never   How often failed to do what normally expected Never   How often needed first drink in am after a heavy drinking session Never   How often feeling of guilt or remorse after drinking Never   How often unable to remember what happened the night before Never   Have you or someone else been injured because of your drinking No   Has anyone been concerned or suggested you cut down on drinking No   TOTAL SCORE - AUDIT 5   Do you use any other substances recreationally? No     Social History     Tobacco Use    Smoking status: Never    Smokeless tobacco: Never   Vaping Use    Vaping Use: Never used   Substance Use Topics    Alcohol use: Yes     Alcohol/week: 0.0 standard drinks of alcohol     Comment: 3-4 bottles of wine a week    Drug use: No           2/2/2024   STI Screening   New sexual partner(s) since last STI/HIV test? No   Last PSA:   PSA   Date Value Ref Range Status   03/08/2021 0.18 0 - 4 ug/L Final     Comment:     Assay Method:  Chemiluminescence using Siemens Vista analyzer     Prostate Specific Antigen Screen   Date Value Ref Range Status   03/08/2022 0.21 0.00 - 4.00 ug/L Final     The 10-year ASCVD risk score (Emy PATEL, et al., 2019) is: 26%    Values used to calculate the score:      Age: 58 years      Sex: Male      Is Non- : Yes      Diabetic: Yes      Tobacco smoker: No      Systolic Blood Pressure: 136 mmHg      Is BP treated: Yes       "HDL Cholesterol: 45 mg/dL      Total Cholesterol: 192 mg/dL        Reviewed and updated as needed this visit by Provider                  Review of Systems    Review of Systems  CONSTITUTIONAL: NEGATIVE for fever, chills, change in weight  ENT/MOUTH: NEGATIVE for ear, mouth and throat problems  RESP: NEGATIVE for significant cough or SOB  CV: NEGATIVE for chest pain, palpitations or peripheral edema  :   PSYCHIATRIC:      Objective    Exam  /86   Pulse 89   Temp 98.1  F (36.7  C) (Temporal)   Resp 18   Ht 1.727 m (5' 8\")   Wt 84.2 kg (185 lb 9.6 oz)   SpO2 100%   BMI 28.22 kg/m     Estimated body mass index is 28.22 kg/m  as calculated from the following:    Height as of this encounter: 1.727 m (5' 8\").    Weight as of this encounter: 84.2 kg (185 lb 9.6 oz).    Physical Exam  GENERAL: Healthy, alert and no distress  EYES: Eyes grossly normal to inspection, conjunctivae and sclerae normal  RESP: Lungs clear to auscultation - no rales, rhonchi or wheezes  CV: Regular rate and rhythm, normal S1 S2, no murmur  MS: No gross musculoskeletal defects noted, no edema  NEURO: Normal strength and tone, mentation intact and speech normal  PSYCH: Mentation appears normal, affect normal/bright       Signed Electronically by: Anahi Jean MD    "

## 2024-02-02 NOTE — PATIENT INSTRUCTIONS
"I placed an order for a colonoscopy: [facility] will call you to coordinate your care as prescribed by the provider. If you don t hear from a representative within 2 business days, please call [phone].     Blood tests today.     Increase the Lantus insulin by 3 units daily every week until the sugars improve.     Will increase dose of Ozempic to 1.0 mg weekly.     Recheck A1c in 3 months. This would be a \"lab only\" appointment, no need for an office visit. Please call our clinic phone # to set this up.      Cut down on coffee, try decaffeinate coffee.     The Jardiance can also make pee more.     Our office will call about setting a stress test. Let me know if they don't contact you.     More exercise.     Come back in 3 months.       "

## 2024-02-03 LAB
ALBUMIN SERPL BCG-MCNC: 4.5 G/DL (ref 3.5–5.2)
ALP SERPL-CCNC: 45 U/L (ref 40–150)
ALT SERPL W P-5'-P-CCNC: 11 U/L (ref 0–70)
ANION GAP SERPL CALCULATED.3IONS-SCNC: 10 MMOL/L (ref 7–15)
AST SERPL W P-5'-P-CCNC: 21 U/L (ref 0–45)
BILIRUB SERPL-MCNC: 0.6 MG/DL
BUN SERPL-MCNC: 8.5 MG/DL (ref 8–23)
CALCIUM SERPL-MCNC: 9.5 MG/DL (ref 8.6–10)
CHLORIDE SERPL-SCNC: 102 MMOL/L (ref 98–107)
CHOLEST SERPL-MCNC: 157 MG/DL
CREAT SERPL-MCNC: 1 MG/DL (ref 0.67–1.17)
DEPRECATED HCO3 PLAS-SCNC: 27 MMOL/L (ref 22–29)
EGFRCR SERPLBLD CKD-EPI 2021: 87 ML/MIN/1.73M2
FASTING STATUS PATIENT QL REPORTED: YES
GLUCOSE SERPL-MCNC: 80 MG/DL (ref 70–99)
HDLC SERPL-MCNC: 40 MG/DL
LDLC SERPL CALC-MCNC: 95 MG/DL
NONHDLC SERPL-MCNC: 117 MG/DL
POTASSIUM SERPL-SCNC: 3.6 MMOL/L (ref 3.4–5.3)
PROT SERPL-MCNC: 7.2 G/DL (ref 6.4–8.3)
PSA SERPL DL<=0.01 NG/ML-MCNC: 0.19 NG/ML (ref 0–3.5)
SODIUM SERPL-SCNC: 139 MMOL/L (ref 135–145)
TRIGL SERPL-MCNC: 112 MG/DL
TSH SERPL DL<=0.005 MIU/L-ACNC: 2.51 UIU/ML (ref 0.3–4.2)

## 2024-02-19 ENCOUNTER — PATIENT OUTREACH (OUTPATIENT)
Dept: GASTROENTEROLOGY | Facility: CLINIC | Age: 59
End: 2024-02-19
Payer: COMMERCIAL

## 2024-02-28 ENCOUNTER — TELEPHONE (OUTPATIENT)
Dept: GASTROENTEROLOGY | Facility: CLINIC | Age: 59
End: 2024-02-28
Payer: COMMERCIAL

## 2024-02-28 DIAGNOSIS — Z12.11 ENCOUNTER FOR SCREENING COLONOSCOPY: Primary | ICD-10-CM

## 2024-02-28 RX ORDER — BISACODYL 5 MG/1
TABLET, DELAYED RELEASE ORAL
Qty: 4 TABLET | Refills: 0 | Status: SHIPPED | OUTPATIENT
Start: 2024-02-28 | End: 2024-03-04

## 2024-02-29 NOTE — TELEPHONE ENCOUNTER
Attempted to contact patient in order to complete pre assessment questions.     No answer. Left message to return call to [phone] option 4    Missed call communication sent via [employer].    Corinne Kliber, RN  Endoscopy Procedure Pre Assessment RN  [phone] option 4

## 2024-02-29 NOTE — TELEPHONE ENCOUNTER
Pre visit planning completed.      Procedure details:    Patient scheduled for Colonoscopy  on 3/12/24.     Arrival time: 0725. Procedure time 0810    Pre op exam needed? N/A    Facility location: [facility]; [address], [place] [address]    Sedation type: Conscious sedation     Indication for procedure: hx of polyps       Chart review:     Electronic implanted devices? No    Recent diagnosis of diverticulitis within the last 6 weeks? No    Diabetic? Yes. See medication holding recommendations.     Diabetic medication HOLDING recommendations: (if applicable)  Oral diabetic medications: Yes:  Jardiance (empagliflozin): HOLD  3 days before procedure.  Metformin (glucophage): HOLD day of procedure.  Diabetic injectables: Yes- Ozempic (Semaglutide). Weekly dosing of medication.  Hold 7 days before procedure.  Follow up with managing provider.   Insulin: Yes. Consult with managing provider       Medication review:    Anticoagulants? No    NSAIDS? No    Other medication HOLDING recommendations:  N/A      Prep for procedure:     Bowel prep recommendation: Extended prep Golytely    Due to:  poor prep/inadequate bowel prep in the past.     Prep instructions sent via [employer] - updated as standard Golytely prep sent during scheduling.   Bowel prep script sent to    [id number] - [place] - [address] ELDER Burks RN  Endoscopy Procedure Pre Assessment RN  [phone] option 4

## 2024-03-04 ENCOUNTER — NURSE TRIAGE (OUTPATIENT)
Dept: FAMILY MEDICINE | Facility: CLINIC | Age: 59
End: 2024-03-04

## 2024-03-04 ENCOUNTER — OFFICE VISIT (OUTPATIENT)
Dept: FAMILY MEDICINE | Facility: CLINIC | Age: 59
End: 2024-03-04
Payer: COMMERCIAL

## 2024-03-04 VITALS
HEIGHT: 68 IN | OXYGEN SATURATION: 99 % | DIASTOLIC BLOOD PRESSURE: 72 MMHG | SYSTOLIC BLOOD PRESSURE: 138 MMHG | BODY MASS INDEX: 29.04 KG/M2 | WEIGHT: 191.6 LBS | RESPIRATION RATE: 20 BRPM | TEMPERATURE: 98.2 F | HEART RATE: 89 BPM
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
## 2024-03-04 PROCEDURE — 99213 OFFICE O/P EST LOW 20 MIN: CPT | Performed by: PHYSICIAN ASSISTANT

## 2024-03-04 RX ORDER — MELOXICAM 15 MG/1
15 TABLET ORAL DAILY
Qty: 90 TABLET | Refills: 1 | Status: SHIPPED | OUTPATIENT
Start: 2024-03-04 | End: 2024-08-13

## 2024-03-04 ASSESSMENT — PAIN SCALES - GENERAL: PAINLEVEL: SEVERE PAIN (7)

## 2024-03-04 NOTE — PROGRESS NOTES
"    Subjective   Stewart is a 58 year old, presenting for the following health issues:  Dog Bite (DOI 3/3/24/Right hand)        3/4/2024     2:53 PM   Additional Questions   Roomed by Rere Duff CMA   Accompanied by None         3/4/2024     2:53 PM   Patient Reported Additional Medications   Patient reports taking the following new medications none     History of Present Illness       Reason for visit:  Bog bite  Symptom onset:  1-3 days ago    He eats 0-1 servings of fruits and vegetables daily.He consumes 1 sweetened beverage(s) daily.He exercises with enough effort to increase his heart rate 10 to 19 minutes per day.  He exercises with enough effort to increase his heart rate 3 or less days per week.   He is taking medications regularly.           Review of Systems  Constitutional, neuro, ENT, endocrine, pulmonary, cardiac, gastrointestinal, genitourinary, musculoskeletal, integument and psychiatric systems are negative, except as otherwise noted.      Objective    /72   Pulse 89   Temp 98.2  F (36.8  C) (Temporal)   Resp 20   Ht 1.727 m (5' 8\")   Wt 86.9 kg (191 lb 9.6 oz)   SpO2 99%   BMI 29.13 kg/m    Body mass index is 29.13 kg/m .  Physical Exam   Hand : puncture wound with surrounding redness and tenderness involving the palm . A second puncture dorsal hand just lateral to the extensor tendon of his index finger  Rom of fingers normal with some pain. Strength of fingers , hand and wrist normal.  No lymphangitis.    Stewart was seen today for dog bite.    Diagnoses and all orders for this visit:    Dog bite, initial encounter  -     meloxicam (MOBIC) 15 MG tablet; Take 1 tablet (15 mg) by mouth daily  -     amoxicillin-clavulanate (AUGMENTIN) 875-125 MG tablet; Take 1 tablet by mouth 2 times daily for 10 days      Advised supportive and symptomatic treatment.  Follow up with Provider - if condition persists or worsens.           Signed Electronically by: Salvatore Ramirez PA-C    "

## 2024-03-04 NOTE — LETTER
My Depression Action Plan  Name: Stewart Turcios   Date of Birth 1965  Date: 3/4/2024    My doctor: Anahi Jean   [facility] clinic: [facility]  [address]  [place] [id number] [phone]            GREEN    ZONE   Good Control    What it looks like:   Things are going generally well. You have normal ups and downs. You may even feel depressed from time to time, but bad moods usually last less than a day.   What you need to do:  Continue to care for yourself (see self care plan)  Check your depression survival kit and update it as needed  Follow your physician s recommendations including any medication.  Do not stop taking medication unless you consult with your physician first.             YELLOW         ZONE Getting Worse    What it looks like:   Depression is starting to interfere with your life.   It may be hard to get out of bed; you may be starting to isolate yourself from others.  Symptoms of depression are starting to last most all day and this has happened for several days.   You may have suicidal thoughts but they are not constant.   What you need to do:     Call your care team. Your response to treatment will improve if you keep your care team informed of your progress. Yellow periods are signs an adjustment may need to be made.     Continue your self-care.  Just get dressed and ready for the day.  Don't give yourself time to talk yourself out of it.    Talk to someone in your support network.    Open up your Depression Self-Care Plan/Wellness Kit.             RED    ZONE Medical Alert - Get Help    What it looks like:   Depression is seriously interfering with your life.   You may experience these or other symptoms: You can t get out of bed most days, can t work or engage in other necessary activities, you have trouble taking care of basic hygiene, or basic responsibilities, thoughts of suicide or death that will not go away, self-injurious  behavior.     What you need to do:  Call your care team and request a same-day appointment. If they are not available (weekends or after hours) call your local crisis line, emergency room or 911.          Depression Self-Care Plan / Wellness Kit    Many people find that medication and therapy are helpful treatments for managing depression. In addition, making small changes to your everyday life can help to boost your mood and improve your wellbeing. Below are some tips for you to consider. Be sure to talk with your medical provider and/or behavioral health consultant if your symptoms are worsening or not improving.     Sleep   Sleep hygiene  means all of the habits that support good, restful sleep. It includes maintaining a consistent bedtime and wake time, using your bedroom only for sleeping or sex, and keeping the bedroom dark and free of distractions like a computer, smartphone, or television.     Develop a Healthy Routine  Maintain good hygiene. Get out of bed in the morning, make your bed, brush your teeth, take a shower, and get dressed. Don t spend too much time viewing media that makes you feel stressed. Find time to relax each day.    Exercise  Get some form of exercise every day. This will help reduce pain and release endorphins, the  feel good  chemicals in your brain. It can be as simple as just going for a walk or doing some gardening, anything that will get you moving.      Diet  Strive to eat healthy foods, including fruits and vegetables. Drink plenty of water. Avoid excessive sugar, caffeine, alcohol, and other mood-altering substances.     Stay Connected with Others  Stay in touch with friends and family members.    Manage Your Mood  Try deep breathing, massage therapy, biofeedback, or meditation. Take part in fun activities when you can. Try to find something to smile about each day.     Psychotherapy  Be open to working with a therapist if your provider recommends it.     Medication  Be sure to  take your medication as prescribed. Most anti-depressants need to be taken every day. It usually takes several weeks for medications to work. Not all medicines work for all people. It is important to follow-up with your provider to make sure you have a treatment plan that is working for you. Do not stop your medication abruptly without first discussing it with your provider.    Crisis Resources   These hotlines are for both adults and children. They and are open 24 hours a day, 7 days a week unless noted otherwise.    National Suicide Prevention Lifeline   988 or 9-837-290-CQLH (0405)    Crisis Text Line    www.crisistextline.org  Text HOME to 328583 from anywhere in the [place], anytime, about any type of crisis. A live, trained crisis counselor will receive the text and respond quickly.    Gold Lifeline for LGBTQ Youth  A national crisis intervention and suicide lifeline for LGBTQ youth under 25. Provides a safe place to talk without judgement. Call 1-193.141.6271; text START to 796594 or visit www.thetrevorproject.org to talk to a trained counselor.    For [facility] crisis numbers, visit the [facility] website at:  https://[place].gov/dhs/people-we-serve/adults/health-care/mental-health/resources/crisis-contacts.jsp

## 2024-03-04 NOTE — TELEPHONE ENCOUNTER
Received call from pt.   He was bit in his hand last right hand by his dog. States that his dog is up to date on vaccines.   He is wondering if he needs abx.  There is some mild swelling per pt. No active bleeding  Triage Protocol: Go To Office Now   Appt made.    Reason for Disposition   Puncture wound or small cut on hands or genitals  (Exception: Puncture from small pet, such as a gerbil, mouse, hamster, puppy; or shallow scratches.)    Additional Information   Negative: Any break in skin from BITE (e.g., cut, puncture, or scratch) and WILD animal at risk for RABIES (e.g., bat, raccoon, de leon, skunk, coyote, other carnivores; see Background for list)   Negative: Any break in skin from BITE (e.g., cut, puncture, or scratch) and PET animal (e.g., dog, cat, or ferret) at risk for RABIES (e.g., sick, stray, unprovoked bite, developing country)   Negative: Any break in skin from BITE (e.g., cut, puncture, or scratch) and monkey   Negative: Major bleeding (actively dripping or spurting) that can't be stopped   Negative: Sounds like a life-threatening emergency to the triager   Negative: Cut (length > 1/8 inch or 3 mm) or skin tear and any animal  (Exception: Superficial scratch that doesn't go through dermis.)   Negative: Bleeding won't stop after 10 minutes of direct pressure (using correct technique)   Negative: EXPOSURE of non-intact skin (e.g., exposed person has dermatitis, abrasion, wound)  with animal BODY FLUID (e.g., saliva such as licking, blood, brain) and animal at high-risk for RABIES (e.g., bat, raccoon, de leon, skunk, coyote, other carnivores)   Negative: Sounds like a serious bite injury to the triager   Negative: SEVERE pain (e.g., excruciating)   Negative: Puncture wound (hole through the skin) from cat (teeth or claws)   Negative: Puncture wound or small cut on face  (Exception: Puncture from small pet, such as a gerbil, mouse, hamster, puppy; or shallow scratches.)    Protocols used: Animal  Bite-A-ALYSON Corona RN

## 2024-03-05 NOTE — TELEPHONE ENCOUNTER
Pre assessment completed for upcoming procedure.   (Please see previous telephone encounter notes for complete details)      Procedure details:    Arrival time and facility location reviewed.    Pre op exam needed? N/A    Designated  policy reviewed. Instructed to have someone stay 6  hours post procedure.       Medication review:    Medications reviewed. Please see supporting documentation below. Holding recommendations discussed (if applicable).   Mobic: Hold x 10 days prior to procedure.    Prep for procedure:     Procedure prep instructions reviewed.        Any additional information needed:  Per pt they are no longer taking ozempic.       Patient  verbalized understanding and had no questions or concerns at this time.      Edwina Kirby RN  Endoscopy Procedure Pre Assessment RN  [phone] option 4

## 2024-03-09 ENCOUNTER — NURSE TRIAGE (OUTPATIENT)
Dept: NURSING | Facility: CLINIC | Age: 59
End: 2024-03-09
Payer: COMMERCIAL

## 2024-03-09 DIAGNOSIS — Z12.11 ENCOUNTER FOR SCREENING COLONOSCOPY: Primary | ICD-10-CM

## 2024-03-09 RX ORDER — BISACODYL 5 MG/1
10 TABLET, DELAYED RELEASE ORAL DAILY
Qty: 4 TABLET | Refills: 0 | Status: SHIPPED | OUTPATIENT
Start: 2024-03-09 | End: 2024-03-11

## 2024-03-09 NOTE — TELEPHONE ENCOUNTER
Supposed to start Colonoscopy prep tomorrow. I went to [facility] to pick it up and they say the order was cancelled.   3/4/2024 he was seen for a dog bite and given antibiotic.   On that visit it appears that the Bisacodyl and PEG were cancelled. ? Error.   Pharmacy: [facility] [address]  Triaged to a disposition of Call PCP now.   Dr Li Deal on call, paged via am com @ 5:34pm.  OK to reorder the colonoscopy prep, same as what was previously ordered.   Transmitted orders @ 5:48pm. Patient notified and will pick them up tonight.     Rosette Fong RN Triage Nurse Advisor 5:49 PM 3/9/2024  Reason for Disposition   [1] Caller has URGENT medicine question about med that PCP or specialist prescribed AND [2] triager unable to answer question   [1] Prescription not at pharmacy AND [2] was prescribed by PCP recently (Exception: Triager has access to EMR and prescription is recorded there. Go to Home Care and confirm for pharmacy.)    Additional Information   Negative: [1] Intentional drug overdose AND [2] suicidal thoughts or ideas   Negative: Drug overdose and triager unable to answer question   Negative: Caller requesting a renewal or refill of a medicine patient is currently taking   Negative: Caller requesting information unrelated to medicine   Negative: Caller requesting information about COVID-19 Vaccine   Negative: Caller requesting information about Emergency Contraception   Negative: Caller requesting information about Combined Birth Control Pills   Negative: Caller requesting information about Progestin Birth Control Pills   Negative: Caller requesting information about Post-Op pain or medicines   Negative: Caller requesting a prescription antibiotic (such as Penicillin) for Strep throat and has a positive culture result   Negative: Caller requesting a prescription anti-viral med (such as Tamiflu) and has influenza (flu) symptoms   Negative: Immunization reaction suspected   Negative: Rash while taking a medicine or  within 3 days of stopping it   Negative: [1] Asthma and [2] having symptoms of asthma (cough, wheezing, etc.)   Negative: [1] Symptom of illness (e.g., headache, abdominal pain, earache, vomiting) AND [2] more than mild   Negative: Breastfeeding questions about mother's medicines and diet   Negative: MORE THAN A DOUBLE DOSE of a prescription or over-the-counter (OTC) drug   Negative: [1] DOUBLE DOSE (an extra dose or lesser amount) of prescription drug AND [2] any symptoms (e.g., dizziness, nausea, pain, sleepiness)   Negative: [1] DOUBLE DOSE (an extra dose or lesser amount) of over-the-counter (OTC) drug AND [2] any symptoms (e.g., dizziness, nausea, pain, sleepiness)   Negative: Took another person's prescription drug   Negative: [1] DOUBLE DOSE (an extra dose or lesser amount) of prescription drug AND [2] NO symptoms  (Exception: A double dose of antibiotics.)   Negative: Diabetes drug error or overdose (e.g., took wrong type of insulin or took extra dose)   Negative: [1] Pharmacy calling with prescription question AND [2] triager unable to answer question    Protocols used: Medication Question Call-A-

## 2024-03-12 ENCOUNTER — HOSPITAL ENCOUNTER (OUTPATIENT)
Facility: AMBULATORY SURGERY CENTER | Age: 59
Discharge: HOME | End: 2024-03-12
Attending: INTERNAL MEDICINE | Admitting: INTERNAL MEDICINE
Payer: COMMERCIAL

## 2024-03-12 VITALS
SYSTOLIC BLOOD PRESSURE: 145 MMHG | HEART RATE: 65 BPM | OXYGEN SATURATION: 97 % | TEMPERATURE: 97 F | RESPIRATION RATE: 16 BRPM | DIASTOLIC BLOOD PRESSURE: 98 MMHG

## 2024-03-12 LAB — COLONOSCOPY: NORMAL

## 2024-03-12 PROCEDURE — 45385 COLONOSCOPY W/LESION REMOVAL: CPT

## 2024-03-12 PROCEDURE — G8918 PT W/O PREOP ORDER IV AB PRO: HCPCS

## 2024-03-12 PROCEDURE — G8907 PT DOC NO EVENTS ON DISCHARG: HCPCS

## 2024-03-12 PROCEDURE — 88305 TISSUE EXAM BY PATHOLOGIST: CPT | Performed by: PATHOLOGY

## 2024-03-12 RX ORDER — FENTANYL CITRATE 50 UG/ML
INJECTION, SOLUTION INTRAMUSCULAR; INTRAVENOUS PRN
Status: DISCONTINUED | OUTPATIENT
Start: 2024-03-12 | End: 2024-03-12 | Stop reason: HOSPADM

## 2024-03-12 RX ORDER — NALOXONE HYDROCHLORIDE 0.4 MG/ML
0.2 INJECTION, SOLUTION INTRAMUSCULAR; INTRAVENOUS; SUBCUTANEOUS
Status: DISCONTINUED | OUTPATIENT
Start: 2024-03-12 | End: 2024-03-13 | Stop reason: HOSPADM

## 2024-03-12 RX ORDER — ONDANSETRON 4 MG/1
4 TABLET, ORALLY DISINTEGRATING ORAL EVERY 6 HOURS PRN
Status: DISCONTINUED | OUTPATIENT
Start: 2024-03-12 | End: 2024-03-13 | Stop reason: HOSPADM

## 2024-03-12 RX ORDER — LIDOCAINE 40 MG/G
CREAM TOPICAL
Status: DISCONTINUED | OUTPATIENT
Start: 2024-03-12 | End: 2024-03-13 | Stop reason: HOSPADM

## 2024-03-12 RX ORDER — ONDANSETRON 2 MG/ML
4 INJECTION INTRAMUSCULAR; INTRAVENOUS
Status: DISCONTINUED | OUTPATIENT
Start: 2024-03-12 | End: 2024-03-13 | Stop reason: HOSPADM

## 2024-03-12 RX ORDER — NALOXONE HYDROCHLORIDE 0.4 MG/ML
0.4 INJECTION, SOLUTION INTRAMUSCULAR; INTRAVENOUS; SUBCUTANEOUS
Status: DISCONTINUED | OUTPATIENT
Start: 2024-03-12 | End: 2024-03-13 | Stop reason: HOSPADM

## 2024-03-12 RX ORDER — FLUMAZENIL 0.1 MG/ML
0.2 INJECTION, SOLUTION INTRAVENOUS
Status: ACTIVE | OUTPATIENT
Start: 2024-03-12 | End: 2024-03-12

## 2024-03-12 RX ORDER — PROCHLORPERAZINE MALEATE 10 MG
10 TABLET ORAL EVERY 6 HOURS PRN
Status: DISCONTINUED | OUTPATIENT
Start: 2024-03-12 | End: 2024-03-13 | Stop reason: HOSPADM

## 2024-03-12 RX ORDER — ONDANSETRON 2 MG/ML
4 INJECTION INTRAMUSCULAR; INTRAVENOUS EVERY 6 HOURS PRN
Status: DISCONTINUED | OUTPATIENT
Start: 2024-03-12 | End: 2024-03-13 | Stop reason: HOSPADM

## 2024-03-12 NOTE — H&P
[facility] Anesthesia Pre-op History and Physical    Stewart Turcios MRN# [id number]   Age: 58 year old YOB: 1965     Date of Exam 3/12/2024         Primary care provider: Anahi Jean         Chief Complaint and/or Reason for Procedure:     History of colon polyps       Active problem list:     Patient Active Problem List    Diagnosis Date Noted    Uncontrolled insulin-treated type 2 diabetes mellitus 05/30/2017     Priority: Medium    Hypertension goal BP (blood pressure) < 140/90 12/07/2015     Priority: Medium    Benign prostatic hypertrophy with urinary frequency 10/28/2015     Priority: Medium     October 28, 2015: Doing well with Flomax 0.4 MG.      Vitamin D deficiency 10/28/2015     Priority: Medium     October 28, 2015: Patient takes 2000 MG of Vitamin D. Will check labs today.      Hyperlipidemia with target LDL less than 100 09/29/2015     Priority: Medium     Diagnosis updated by automated process. Provider to review and confirm.      Erectile dysfunction 04/11/2013     Priority: Medium     October 28, 2015: Does well with Cialis 5 MG prn, no significant contraindications. Denies significant drops in BP or other side effects.      LATENT TB, LUNG - NOT ACTIVE/NOT CONTAGIOUS 12/30/2012     Priority: Medium            Medications (include herbals and vitamins):   Any Plavix use in the last 7 days? No     Current Outpatient Medications   Medication Sig    amoxicillin-clavulanate (AUGMENTIN) 875-125 MG tablet Take 1 tablet by mouth 2 times daily for 10 days    ASPIRIN 81 MG OR TABS 1 tab po QD (Once per day)    buPROPion (WELLBUTRIN XL) 300 MG 24 hr tablet TAKE 1 TABLET (300 MG) BY MOUTH EVERY MORNING FOR MOOD.    Cholecalciferol (VITAMIN D3 PO) Take 2,000 Units by mouth daily    empagliflozin (JARDIANCE) 10 MG TABS tablet TAKE 1 TABLET BY MOUTH EVERY DAY FOR DIABETES    insulin glargine (LANTUS SOLOSTAR) 100 UNIT/ML pen INJECT 27 UNITS SUBCUTANEOUS AT BEDTIME    insulin lispro  (HUMALOG KWIKPEN) 100 UNIT/ML (1 unit dial) KWIKPEN INJECT 10 UNITS SUBCUTANEOUS 2 TIMES DAILY (BEFORE MEALS) + SLIDING SCALE. TOTAL/DAY ~30 UNITS    losartan (COZAAR) 100 MG tablet TAKE 1 TABLET (100 MG) BY MOUTH DAILY FOR BLOOD PRESSURE.    meloxicam (MOBIC) 15 MG tablet Take 1 tablet (15 mg) by mouth daily    metFORMIN (GLUCOPHAGE) 500 MG tablet TAKE 2 TABLETS BY MOUTH TWICE A DAY WITH MEALS    Multiple Vitamins-Minerals (MULTIVITAMIN OR)     semaglutide (OZEMPIC, 0.25 OR 0.5 MG/DOSE,) 2 MG/1.5ML SOPN pen Inject 0.5 mg Subcutaneous every 7 days For diabetes.    Semaglutide, 1 MG/DOSE, (OZEMPIC) 4 MG/3ML pen Inject 1 mg Subcutaneous every 7 days    sildenafil (VIAGRA) 100 MG tablet Take 1 tablet (100 mg) by mouth daily as needed    simvastatin (ZOCOR) 10 MG tablet Take 1 tablet (10 mg) by mouth at bedtime For cholesterol.    ACCU-CHEK GUIDE test strip USE TO TEST BLOOD SUGAR 5 TIMES DAILY OR AS DIRECTED    BD PEN NEEDLE MUSA 2ND GEN 32G X 4 MM miscellaneous USE 4 PEN NEEDLES DAILY    blood glucose monitoring (ACCU-CHEK FASTCLIX) lancets Use to test blood sugar 5 times daily or as directed.  Ok to substitute alternative if insurance prefers.    Continuous Blood Gluc  (DEXCOM G6 ) NICOLE Use to read blood sugars as per 's instructions.    Continuous Blood Gluc Sensor (FREESTYLE CINDY 14 DAY SENSOR) MISC APPLY 1 EACH EVERY 14 DAYS CHANGE EVERY 14 DAYS.    sildenafil (REVATIO) 20 MG tablet Take 1 tablet (20 mg) by mouth once as needed     No current facility-administered medications for this encounter.             Allergies:      Allergies   Allergen Reactions    No Known Allergies      Allergy to Latex? No  Allergy to tape?   No  Intolerances:             Physical Exam:   All vitals have been reviewed  Patient Vitals for the past 8 hrs:   BP Temp Temp src Pulse Resp SpO2   03/12/24 0746 (!) 165/82 97.9  F (36.6  C) Temporal 79 18 99 %     No intake/output data recorded.  Lungs:   No  increased work of breathing, good air exchange, clear to auscultation bilaterally, no crackles or wheezing     Cardiovascular:   normal S1 and S2             Lab / Radiology Results:            Anesthetic risk and/or ASA classification:     2  Blanca Cuevas DO

## 2024-03-20 DIAGNOSIS — E11.9 CONTROLLED TYPE 2 DIABETES MELLITUS WITHOUT COMPLICATION, WITH LONG-TERM CURRENT USE OF INSULIN (H): ICD-10-CM

## 2024-03-20 DIAGNOSIS — Z79.4 CONTROLLED TYPE 2 DIABETES MELLITUS WITHOUT COMPLICATION, WITH LONG-TERM CURRENT USE OF INSULIN (H): ICD-10-CM

## 2024-03-21 RX ORDER — INSULIN GLARGINE 100 [IU]/ML
27 INJECTION, SOLUTION SUBCUTANEOUS AT BEDTIME
Qty: 30 ML | Refills: 3 | Status: SHIPPED | OUTPATIENT
Start: 2024-03-21 | End: 2024-08-09

## 2024-04-01 ENCOUNTER — TRANSFERRED RECORDS (OUTPATIENT)
Dept: MULTI SPECIALTY CLINIC | Facility: CLINIC | Age: 59
End: 2024-04-01

## 2024-04-01 LAB — RETINOPATHY: NORMAL

## 2024-04-14 DIAGNOSIS — E11.65 UNCONTROLLED TYPE 2 DIABETES MELLITUS WITH HYPERGLYCEMIA (H): ICD-10-CM

## 2024-04-15 RX ORDER — EMPAGLIFLOZIN 10 MG/1
TABLET, FILM COATED ORAL
Qty: 90 TABLET | Refills: 1 | Status: SHIPPED | OUTPATIENT
Start: 2024-04-15

## 2024-04-16 DIAGNOSIS — Z79.4 CONTROLLED TYPE 2 DIABETES MELLITUS WITHOUT COMPLICATION, WITH LONG-TERM CURRENT USE OF INSULIN (H): ICD-10-CM

## 2024-04-16 DIAGNOSIS — E11.9 CONTROLLED TYPE 2 DIABETES MELLITUS WITHOUT COMPLICATION, WITH LONG-TERM CURRENT USE OF INSULIN (H): ICD-10-CM

## 2024-04-16 RX ORDER — SEMAGLUTIDE 1.34 MG/ML
1 INJECTION, SOLUTION SUBCUTANEOUS
Qty: 3 ML | Refills: 0 | Status: SHIPPED | OUTPATIENT
Start: 2024-04-16 | End: 2024-05-14

## 2024-05-05 DIAGNOSIS — I10 HYPERTENSION GOAL BP (BLOOD PRESSURE) < 140/90: ICD-10-CM

## 2024-05-06 RX ORDER — LOSARTAN POTASSIUM 100 MG/1
100 TABLET ORAL DAILY
Qty: 90 TABLET | Refills: 1 | OUTPATIENT
Start: 2024-05-06

## 2024-05-09 DIAGNOSIS — Z79.4 CONTROLLED TYPE 2 DIABETES MELLITUS WITHOUT COMPLICATION, WITH LONG-TERM CURRENT USE OF INSULIN (H): ICD-10-CM

## 2024-05-09 DIAGNOSIS — E11.9 CONTROLLED TYPE 2 DIABETES MELLITUS WITHOUT COMPLICATION, WITH LONG-TERM CURRENT USE OF INSULIN (H): ICD-10-CM

## 2024-05-10 RX ORDER — INSULIN LISPRO 100 [IU]/ML
INJECTION, SOLUTION INTRAVENOUS; SUBCUTANEOUS
Qty: 30 ML | Refills: 1 | Status: SHIPPED | OUTPATIENT
Start: 2024-05-10

## 2024-05-14 DIAGNOSIS — E11.9 CONTROLLED TYPE 2 DIABETES MELLITUS WITHOUT COMPLICATION, WITH LONG-TERM CURRENT USE OF INSULIN (H): ICD-10-CM

## 2024-05-14 DIAGNOSIS — Z79.4 CONTROLLED TYPE 2 DIABETES MELLITUS WITHOUT COMPLICATION, WITH LONG-TERM CURRENT USE OF INSULIN (H): ICD-10-CM

## 2024-05-14 RX ORDER — SEMAGLUTIDE 1.34 MG/ML
1 INJECTION, SOLUTION SUBCUTANEOUS
Qty: 3 ML | Refills: 3 | Status: SHIPPED | OUTPATIENT
Start: 2024-05-14 | End: 2024-08-13

## 2024-05-31 DIAGNOSIS — Z79.4 CONTROLLED TYPE 2 DIABETES MELLITUS WITHOUT COMPLICATION, WITH LONG-TERM CURRENT USE OF INSULIN (H): ICD-10-CM

## 2024-05-31 DIAGNOSIS — E11.9 CONTROLLED TYPE 2 DIABETES MELLITUS WITHOUT COMPLICATION, WITH LONG-TERM CURRENT USE OF INSULIN (H): ICD-10-CM

## 2024-05-31 RX ORDER — BLOOD SUGAR DIAGNOSTIC
STRIP MISCELLANEOUS
Qty: 150 STRIP | Refills: 2 | Status: SHIPPED | OUTPATIENT
Start: 2024-05-31 | End: 2024-06-03

## 2024-06-03 RX ORDER — BLOOD SUGAR DIAGNOSTIC
STRIP MISCELLANEOUS
Qty: 150 STRIP | Refills: 2 | Status: SHIPPED | OUTPATIENT
Start: 2024-06-03

## 2024-06-06 ENCOUNTER — HOSPITAL ENCOUNTER (OUTPATIENT)
Dept: CARDIOLOGY | Facility: CLINIC | Age: 59
Discharge: HOME | End: 2024-06-06
Attending: FAMILY MEDICINE | Admitting: FAMILY MEDICINE
Payer: COMMERCIAL

## 2024-06-06 ENCOUNTER — NURSE TRIAGE (OUTPATIENT)
Dept: FAMILY MEDICINE | Facility: CLINIC | Age: 59
End: 2024-06-06

## 2024-06-06 VITALS — DIASTOLIC BLOOD PRESSURE: 101 MMHG | SYSTOLIC BLOOD PRESSURE: 194 MMHG

## 2024-06-06 DIAGNOSIS — I10 HYPERTENSION GOAL BP (BLOOD PRESSURE) < 140/90: ICD-10-CM

## 2024-06-06 DIAGNOSIS — Z79.4 CONTROLLED TYPE 2 DIABETES MELLITUS WITHOUT COMPLICATION, WITH LONG-TERM CURRENT USE OF INSULIN (H): ICD-10-CM

## 2024-06-06 DIAGNOSIS — E11.9 CONTROLLED TYPE 2 DIABETES MELLITUS WITHOUT COMPLICATION, WITH LONG-TERM CURRENT USE OF INSULIN (H): ICD-10-CM

## 2024-06-06 DIAGNOSIS — R07.9 CHEST PAIN, UNSPECIFIED TYPE: ICD-10-CM

## 2024-06-06 PROCEDURE — 93350 STRESS TTE ONLY: CPT | Mod: 26 | Performed by: STUDENT IN AN ORGANIZED HEALTH CARE EDUCATION/TRAINING PROGRAM

## 2024-06-06 PROCEDURE — C8928 TTE W OR W/O FOL W/CON,STRES: HCPCS

## 2024-06-06 PROCEDURE — 93016 CV STRESS TEST SUPVJ ONLY: CPT | Performed by: STUDENT IN AN ORGANIZED HEALTH CARE EDUCATION/TRAINING PROGRAM

## 2024-06-06 PROCEDURE — 93325 DOPPLER ECHO COLOR FLOW MAPG: CPT | Mod: TC

## 2024-06-06 PROCEDURE — 255N000002 HC RX 255 OP 636: Performed by: STUDENT IN AN ORGANIZED HEALTH CARE EDUCATION/TRAINING PROGRAM

## 2024-06-06 PROCEDURE — 93325 DOPPLER ECHO COLOR FLOW MAPG: CPT | Mod: 26 | Performed by: STUDENT IN AN ORGANIZED HEALTH CARE EDUCATION/TRAINING PROGRAM

## 2024-06-06 PROCEDURE — 93321 DOPPLER ECHO F-UP/LMTD STD: CPT | Mod: 26 | Performed by: STUDENT IN AN ORGANIZED HEALTH CARE EDUCATION/TRAINING PROGRAM

## 2024-06-06 PROCEDURE — 250N000011 HC RX IP 250 OP 636: Mod: JZ | Performed by: STUDENT IN AN ORGANIZED HEALTH CARE EDUCATION/TRAINING PROGRAM

## 2024-06-06 PROCEDURE — 93018 CV STRESS TEST I&R ONLY: CPT | Performed by: STUDENT IN AN ORGANIZED HEALTH CARE EDUCATION/TRAINING PROGRAM

## 2024-06-06 RX ORDER — HYDRALAZINE HYDROCHLORIDE 20 MG/ML
10 INJECTION INTRAMUSCULAR; INTRAVENOUS ONCE
Status: COMPLETED | OUTPATIENT
Start: 2024-06-06 | End: 2024-06-06

## 2024-06-06 RX ORDER — LOSARTAN POTASSIUM 100 MG/1
100 TABLET ORAL DAILY
Qty: 90 TABLET | Refills: 1 | Status: SHIPPED | OUTPATIENT
Start: 2024-06-06

## 2024-06-06 RX ADMIN — HYDRALAZINE HYDROCHLORIDE 10 MG: 20 INJECTION INTRAMUSCULAR; INTRAVENOUS at 11:11

## 2024-06-06 RX ADMIN — PERFLUTREN 5 ML: 6.52 INJECTION, SUSPENSION INTRAVENOUS at 10:52

## 2024-06-06 NOTE — TELEPHONE ENCOUNTER
RN called patient and relayed providers message. Patient verbalized understanding.     RN assisted in scheduling patient for 1:30pm appointment for tomorrow 6/7/24 per PCP.     RN instructed patient to be seen in ER/UC if he begins to show any symptoms. Patient verbalized understanding. RN educated patient on 24 hour nurse line.     Estelle Villarreal RN on 6/6/2024 at 1:53 PM

## 2024-06-06 NOTE — PROGRESS NOTES
Pt here for exercise stress test. Pt was hypertensive with a BP of 211/89. Administered 10 mg IV hydralazine per verbal order. BP after administration was 194/101.

## 2024-06-06 NOTE — TELEPHONE ENCOUNTER
"Received call from patient. He reports having an echocardiogram today, and his blood pressure was extremely high (per chart review: 211/89, and 194/101). He was triaged- he is entirely asymptomatic currently. Reports intermittent mild headaches at night. However, he was advised to be seen today, 06/06/24 per protocol for the high blood pressures unless he should develop any red flag symptoms, be seen in ED (education completed). No openings available today and patient would like to wait until he can get in with Dr. Jean, if possible. Writer notified him of red flag symptoms to watch for while awaiting a call back regarding this. Of note, patient states he does not have a history of high blood pressure and does not take medication, although it is noted in his history and he does have Losartan prescribed. Education completed on this but patient does not recall having this in his medical history.    Reason for Disposition   Systolic BP >= 200 OR Diastolic >= 120 and having NO cardiac or neurologic symptoms    Additional Information   Negative: Sounds like a life-threatening emergency to the triager   Negative: Symptom is main concern (e.g., headache, chest pain)   Negative: Low blood pressure is main concern   Negative: Pregnant 20 or more weeks (or postpartum < 6 weeks) with new hand or face swelling   Negative: Pregnant 20 or more weeks (or postpartum < 6 weeks) and Systolic BP >= 160 OR Diastolic >= 110   Negative: Systolic BP >= 160 OR Diastolic >= 100, and any cardiac (e.g., breathing difficulty, chest pain) or neurologic symptoms (e.g., new-onset blurred or double vision)   Negative: Patient sounds very sick or weak to the triager    Answer Assessment - Initial Assessment Questions  1. BLOOD PRESSURE: \"What is the blood pressure?\" \"Did you take at least two measurements 5 minutes apart?\"      194/101 - this is from when patient had echocardiogram this morning   2. ONSET: \"When did you take your blood " "pressure?\"      11 am  3. HOW: \"How did you take your blood pressure?\" (e.g., automatic home BP monitor, visiting nurse)      At echocardiogram   4. HISTORY: \"Do you have a history of high blood pressure?\"      Patient states he does not   5. MEDICINES: \"Are you taking any medicines for blood pressure?\" \"Have you missed any doses recently?\"      Patient states no  6. OTHER SYMPTOMS: \"Do you have any symptoms?\" (e.g., blurred vision, chest pain, difficulty breathing, headache, weakness)      Slight headaches at night   7. PREGNANCY: \"Is there any chance you are pregnant?\" \"When was your last menstrual period?\"      No    Protocols used: Blood Pressure - High-A-OH    SANTIAGO Amin RN  [facility], [facility]  "

## 2024-06-06 NOTE — TELEPHONE ENCOUNTER
Provider Response to 2nd Level Triage Request    I have reviewed the RN documentation. My recommendation is:     Since he is asymptomatic, okay to wait till tomorrow.  May use my same-day appointment at 1:30 PM.    He should be taking his losartan.  I did send a prescription to be on file at his pharmacy.    Please contact the patient and inform him that I can see him at 130 tomorrow and he should be taking his losartan 100 mg daily.  If he does not have this medication, there is a prescription at his pharmacy.  Thank you.

## 2024-06-07 ENCOUNTER — OFFICE VISIT (OUTPATIENT)
Dept: FAMILY MEDICINE | Facility: CLINIC | Age: 59
End: 2024-06-07
Payer: COMMERCIAL

## 2024-06-07 VITALS
TEMPERATURE: 98.2 F | RESPIRATION RATE: 18 BRPM | DIASTOLIC BLOOD PRESSURE: 80 MMHG | HEIGHT: 70 IN | BODY MASS INDEX: 27.11 KG/M2 | HEART RATE: 86 BPM | WEIGHT: 189.4 LBS | SYSTOLIC BLOOD PRESSURE: 162 MMHG | OXYGEN SATURATION: 100 %

## 2024-06-07 DIAGNOSIS — E11.9 CONTROLLED TYPE 2 DIABETES MELLITUS WITHOUT COMPLICATION, WITH LONG-TERM CURRENT USE OF INSULIN (H): ICD-10-CM

## 2024-06-07 DIAGNOSIS — R07.9 CHEST PAIN, UNSPECIFIED TYPE: ICD-10-CM

## 2024-06-07 DIAGNOSIS — Z79.4 CONTROLLED TYPE 2 DIABETES MELLITUS WITHOUT COMPLICATION, WITH LONG-TERM CURRENT USE OF INSULIN (H): ICD-10-CM

## 2024-06-07 DIAGNOSIS — I10 HYPERTENSION GOAL BP (BLOOD PRESSURE) < 140/90: Primary | ICD-10-CM

## 2024-06-07 PROCEDURE — 99214 OFFICE O/P EST MOD 30 MIN: CPT | Performed by: FAMILY MEDICINE

## 2024-06-07 RX ORDER — AMLODIPINE BESYLATE 5 MG/1
5 TABLET ORAL DAILY
Qty: 90 TABLET | Refills: 0 | Status: SHIPPED | OUTPATIENT
Start: 2024-06-07

## 2024-06-07 RX ORDER — PEN NEEDLE, DIABETIC 32GX 5/32"
NEEDLE, DISPOSABLE MISCELLANEOUS
Qty: 100 EACH | Refills: 7 | Status: SHIPPED | OUTPATIENT
Start: 2024-06-07

## 2024-06-07 RX ORDER — PEN NEEDLE, DIABETIC 32GX 5/32"
NEEDLE, DISPOSABLE MISCELLANEOUS
Qty: 100 EACH | Refills: 7 | Status: CANCELLED | OUTPATIENT
Start: 2024-06-07

## 2024-06-07 ASSESSMENT — PAIN SCALES - GENERAL: PAINLEVEL: NO PAIN (0)

## 2024-06-07 NOTE — PROGRESS NOTES
Assessment & Plan     (I10) Hypertension goal BP (blood pressure) < 140/90  (primary encounter diagnosis)  Comment: patient has been inconsistent with taking his ARB medication. Given  descent, this may not be the most effective blood pressure medication.  Plan: amLODIPine (NORVASC) 5 MG tablet        Will add amlodipine to his ARB. Reviewed side effects of medication     (E11.9,  Z79.4) Controlled type 2 diabetes mellitus without complication, with long-term current use of insulin (H)  Comment: improved but still above recommended guide lines.   Lab Results   Component Value Date    A1C 8.6 02/02/2024    A1C 9.0 11/01/2023    A1C 8.7 07/27/2023    A1C 8.5 03/15/2023    A1C 7.5 10/04/2022    A1C 9.2 06/15/2021    A1C 8.8 03/08/2021    A1C 7.7 06/22/2020    A1C 8.1 02/14/2020    A1C 7.0 11/15/2019      Plan: insulin pen needle (BD PEN NEEDLE MUSA 2ND GEN)        32G X 4 MM miscellaneous        No change in medications. Advised to focus on lifestyle, reduce alcohol intake. See patient instructions.     (R07.9) Chest pain, unspecified type  Comment: symptom free now. Recent stress echo did not show any ischemic however was stopped before completion secondary to high blood pressure  Plan: for now, monitor. Will no repeat stress echo until blood pressure is within guide lines.       No results found for any visits on 06/07/24.     Patient Instructions   Work on cutting down on your alcohol use.     Start a new blood pressure medication: amlodipine.     Please schedule a follow up appointment in 2 months. We'll check you blood pressure and A1c at this time.     Call or My Chart with any questions or concerns.        Paty William is a 58 year old, presenting for the following health issues:  Hypertension and Diabetes        6/7/2024     1:05 PM   Additional Questions   Roomed by Ashley   Accompanied by Self     Via the Health Maintenance questionnaire, the patient has reported the following services have  been completed , this information has been sent to the abstraction team.  History of Present Illness       Diabetes:   He presents for follow up of diabetes.  He is checking home blood glucose two times daily.   He checks blood glucose after meals and at bedtime.  Blood glucose is sometimes over 200 and never under 70. He is aware of hypoglycemia symptoms including dizziness.   He is concerned about blood sugar frequently over 200.    He is not experiencing numbness or burning in feet, excessive thirst, blurry vision, weight changes or redness, sores or blisters on feet. The patient has had a diabetic eye exam in the last 12 months. Eye exam performed on 04/01/2024. Location of last eye exam [facility].        Hypertension: He presents for follow up of hypertension.  He does not check blood pressure  regularly outside of the clinic. Outside blood pressures have been over 140/90. He does not follow a low salt diet.     He eats 0-1 servings of fruits and vegetables daily.He consumes 1 sweetened beverage(s) daily.He exercises with enough effort to increase his heart rate 9 or less minutes per day.  He exercises with enough effort to increase his heart rate 3 or less days per week.   He is taking medications regularly.         Diabetes Follow-up    How often are you checking your blood sugar? Two times daily  Blood sugar testing frequency justification:  Uncontrolled diabetes  What time of day are you checking your blood sugars (select all that apply)?  After meals and At bedtime  Have you had any blood sugars above 200?  Yes   Have you had any blood sugars below 70?  No  What symptoms do you notice when your blood sugar is low?  Dizzy  What concerns do you have today about your diabetes? None and Blood sugar is often over 200   Do you have any of these symptoms? (Select all that apply)  No numbness or tingling in feet.  No redness, sores or blisters on feet.  No complaints of excessive thirst.  No reports of  "blurry vision.  No significant changes to weight.      BP Readings from Last 2 Encounters:   06/07/24 (!) 162/80   06/06/24 (!) 194/101     Hemoglobin A1C (%)   Date Value   02/02/2024 8.6 (H)   11/01/2023 9.0 (H)   06/15/2021 9.2 (H)   03/08/2021 8.8 (H)     LDL Cholesterol Calculated (mg/dL)   Date Value   02/02/2024 95   03/15/2023 82   03/08/2021 52   06/22/2020 47     LDL Cholesterol Direct (mg/dL)   Date Value   03/08/2022 79             Hypertension Follow-up    Do you check your blood pressure regularly outside of the clinic? Yes   Are you following a low salt diet? No  Are your blood pressures ever more than 140 on the top number (systolic) OR more   than 90 on the bottom number (diastolic), for example 140/90? Yes  How many servings of fruits and vegetables do you eat daily?  0-1  On average, how many sweetened beverages do you drink each day (Examples: soda, juice, sweet tea, etc.  Do NOT count diet or artificially sweetened beverages)?   1  How many days per week do you exercise enough to make your heart beat faster? 3 or less  How many minutes a day do you exercise enough to make your heart beat faster? 9 or less  How many days per week do you miss taking your medication? 0        Review of Systems  Constitutional, HEENT, cardiovascular, pulmonary, gi and gu systems are negative, except as otherwise noted.      Objective    BP (!) 162/80   Pulse 86   Temp 98.2  F (36.8  C) (Temporal)   Resp 18   Ht 1.77 m (5' 9.69\")   Wt 85.9 kg (189 lb 6.4 oz)   SpO2 100%   BMI 27.42 kg/m    Body mass index is 27.42 kg/m .  Physical Exam   GENERAL: Healthy, alert and no distress  EYES: Eyes grossly normal to inspection, conjunctivae and sclerae normal  RESP: Lungs clear to auscultation - no rales, rhonchi or wheezes  CV: Regular rate and rhythm, normal S1 S2, no murmur  MS: No gross musculoskeletal defects noted, no edema  NEURO: Normal strength and tone, mentation intact and speech normal  PSYCH: Mentation " appears normal, affect normal/bright     Signed Electronically by: Anahi Jean MD

## 2024-06-09 ENCOUNTER — HEALTH MAINTENANCE LETTER (OUTPATIENT)
Age: 59
End: 2024-06-09

## 2024-06-17 NOTE — PATIENT INSTRUCTIONS
Work on cutting down on your alcohol use.     Start a new blood pressure medication: amlodipine.     Please schedule a follow up appointment in 2 months. We'll check you blood pressure and A1c at this time.     Call or My Chart with any questions or concerns.
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
## 2024-07-19 RX ORDER — MELOXICAM 15 MG/1
15 TABLET ORAL DAILY
Qty: 90 TABLET | Refills: 1 | OUTPATIENT
Start: 2024-07-19

## 2024-08-09 ENCOUNTER — TELEPHONE (OUTPATIENT)
Dept: FAMILY MEDICINE | Facility: CLINIC | Age: 59
End: 2024-08-09
Payer: COMMERCIAL

## 2024-08-09 DIAGNOSIS — Z79.4 CONTROLLED TYPE 2 DIABETES MELLITUS WITHOUT COMPLICATION, WITH LONG-TERM CURRENT USE OF INSULIN (H): ICD-10-CM

## 2024-08-09 DIAGNOSIS — E11.9 CONTROLLED TYPE 2 DIABETES MELLITUS WITHOUT COMPLICATION, WITH LONG-TERM CURRENT USE OF INSULIN (H): ICD-10-CM

## 2024-08-09 NOTE — TELEPHONE ENCOUNTER
"Patient stated that he has been using (per provider prescription) Lantus 38 units at night. He stated that his blood glucose is over 200 in the mornings so he decided to \"up\" his dose.     He stated he gets better results now that he has increased his Lantus to 40 units at night and then he gets better results in the morning.     RN assisted with follow up 8/13/24 and educated that the provider should be the one to change dosing on medication. Encouraged him to keep a log of blood sugars for the provider.     Routing to provider to update.     Petrona Johnson RN on 8/9/2024 at 12:29 PM    "

## 2024-08-12 RX ORDER — INSULIN GLARGINE 100 [IU]/ML
40 INJECTION, SOLUTION SUBCUTANEOUS AT BEDTIME
Qty: 45 ML | Refills: 3 | Status: SHIPPED | OUTPATIENT
Start: 2024-08-12

## 2024-08-12 NOTE — TELEPHONE ENCOUNTER
I'm okay with increasing his nighttime Lantus to 40 mg at bedtime.  An updated prescription was sent to his pharmacy.

## 2024-08-13 ENCOUNTER — OFFICE VISIT (OUTPATIENT)
Dept: FAMILY MEDICINE | Facility: CLINIC | Age: 59
End: 2024-08-13
Payer: COMMERCIAL

## 2024-08-13 VITALS
BODY MASS INDEX: 27.11 KG/M2 | RESPIRATION RATE: 18 BRPM | OXYGEN SATURATION: 100 % | DIASTOLIC BLOOD PRESSURE: 80 MMHG | HEART RATE: 97 BPM | HEIGHT: 70 IN | TEMPERATURE: 96.8 F | WEIGHT: 189.4 LBS | SYSTOLIC BLOOD PRESSURE: 160 MMHG

## 2024-08-13 DIAGNOSIS — E11.65 UNCONTROLLED TYPE 2 DIABETES MELLITUS WITH HYPERGLYCEMIA, WITH LONG-TERM CURRENT USE OF INSULIN (H): Primary | ICD-10-CM

## 2024-08-13 DIAGNOSIS — E78.5 HYPERLIPIDEMIA WITH TARGET LDL LESS THAN 100: ICD-10-CM

## 2024-08-13 DIAGNOSIS — Z79.4 UNCONTROLLED TYPE 2 DIABETES MELLITUS WITH HYPERGLYCEMIA, WITH LONG-TERM CURRENT USE OF INSULIN (H): Primary | ICD-10-CM

## 2024-08-13 DIAGNOSIS — N40.1 BENIGN PROSTATIC HYPERPLASIA WITH URINARY FREQUENCY: ICD-10-CM

## 2024-08-13 DIAGNOSIS — R35.0 BENIGN PROSTATIC HYPERPLASIA WITH URINARY FREQUENCY: ICD-10-CM

## 2024-08-13 DIAGNOSIS — F10.10 ALCOHOL ABUSE: ICD-10-CM

## 2024-08-13 DIAGNOSIS — R39.15 URINARY URGENCY: ICD-10-CM

## 2024-08-13 DIAGNOSIS — I10 HYPERTENSION GOAL BP (BLOOD PRESSURE) < 140/90: ICD-10-CM

## 2024-08-13 LAB
HBA1C MFR BLD: 9.3 % (ref 0–5.6)
HOLD SPECIMEN: NORMAL

## 2024-08-13 PROCEDURE — 99214 OFFICE O/P EST MOD 30 MIN: CPT | Performed by: FAMILY MEDICINE

## 2024-08-13 PROCEDURE — 83036 HEMOGLOBIN GLYCOSYLATED A1C: CPT | Performed by: FAMILY MEDICINE

## 2024-08-13 PROCEDURE — G2211 COMPLEX E/M VISIT ADD ON: HCPCS | Performed by: FAMILY MEDICINE

## 2024-08-13 PROCEDURE — 36415 COLL VENOUS BLD VENIPUNCTURE: CPT | Performed by: FAMILY MEDICINE

## 2024-08-13 RX ORDER — KETOROLAC TROMETHAMINE 30 MG/ML
1 INJECTION, SOLUTION INTRAMUSCULAR; INTRAVENOUS ONCE
Qty: 1 EACH | Refills: 0 | Status: SHIPPED | OUTPATIENT
Start: 2024-08-13 | End: 2024-08-13

## 2024-08-13 RX ORDER — AMLODIPINE BESYLATE 10 MG/1
10 TABLET ORAL DAILY
Qty: 90 TABLET | Refills: 1 | Status: SHIPPED | OUTPATIENT
Start: 2024-08-13

## 2024-08-13 RX ORDER — BLOOD-GLUCOSE SENSOR
1 EACH MISCELLANEOUS
Qty: 6 EACH | Refills: 5 | Status: SHIPPED | OUTPATIENT
Start: 2024-08-13

## 2024-08-13 RX ORDER — NALTREXONE HYDROCHLORIDE 50 MG/1
50 TABLET, FILM COATED ORAL DAILY
Qty: 90 TABLET | Refills: 1 | Status: SHIPPED | OUTPATIENT
Start: 2024-08-13

## 2024-08-13 ASSESSMENT — PATIENT HEALTH QUESTIONNAIRE - PHQ9
SUM OF ALL RESPONSES TO PHQ QUESTIONS 1-9: 1
SUM OF ALL RESPONSES TO PHQ QUESTIONS 1-9: 1
10. IF YOU CHECKED OFF ANY PROBLEMS, HOW DIFFICULT HAVE THESE PROBLEMS MADE IT FOR YOU TO DO YOUR WORK, TAKE CARE OF THINGS AT HOME, OR GET ALONG WITH OTHER PEOPLE: NOT DIFFICULT AT ALL

## 2024-08-13 NOTE — PATIENT INSTRUCTIONS
I'll send in for the Niharika 3 glucose monitor. Hopefully, it will be covered.     Restart the Ozempic: okay to take every other week. I'll send in for a new prescription.     Increase the blood pressure medication, amlodipine, to 10 mg daily. I'll send for a new prescription. In the meantime, take (2) of the 5 mg tablets.     We can try a medication to help with the alcohol cravings. It's called naltrexone. Once per day.     Follow up appointment in 3 months.     Controlling your sugars will help with the frequent urination.     Okay to increase the Lantus by 3 units per week.     Call or My Chart with any questions or concerns.

## 2024-08-13 NOTE — PROGRESS NOTES
Assessment & Plan     (E11.65,  Z79.4) Uncontrolled type 2 diabetes mellitus with hyperglycemia, with long-term current use of insulin (H)  (primary encounter diagnosis)  Comment: Patient currently on metformin,  short and long-acting insulin.  He had been on GLP-1 agonist but noted weight loss.  He recently increase his nighttime Lantus to 40 units nightly.  Reviewed the need to lower his blood sugars reading.  Given he is on insulin, will send in for continuous glucose monitor.  Reviewed he may increase his Lantus dose by 10 %/week until he achieves fasting morning sugars less than 150.  Offered diabetic education, deferred per patient.  Will also repeat start GLP-1 agonist at a low dose to help minimize weight loss.  Lab Results   Component Value Date    A1C 9.3 08/13/2024    A1C 8.6 02/02/2024    A1C 9.0 11/01/2023    A1C 8.7 07/27/2023    A1C 8.5 03/15/2023    A1C 9.2 06/15/2021    A1C 8.8 03/08/2021    A1C 7.7 06/22/2020    A1C 8.1 02/14/2020    A1C 7.0 11/15/2019      Plan: Continuous Glucose  (FREESTYLE CINDY 3         READER) NICOLE, Continuous Glucose Sensor         (FREESTYLE CINDY 3 SENSOR) [facility]        Follow-up in 3 months.    (I10) Hypertension goal BP (blood pressure) < 140/90  Comment: Elevated, may be secondary to increased alcohol usage.  Plan: Will increase amlodipine from 5 mg to 10 mg daily, counseled to decrease alcohol usage.    (R39.15) Urinary urgency  Comment: May be secondary to elevated blood sugar levels.  Plan: Hopefully, reducing his blood sugar will help with urinary frequency.    (F10.10) Alcohol abuse  Comment: Patient is quite functional, but is unable to decrease his alcohol intake.  Plan: Reviewed options, will start naltrexone 50 mg daily to see if this helps curb his alcohol intake.  At this time, chemical dependency evaluation not indicated.    (E78.5) Hyperlipidemia with target LDL less than 100  Comment: Tolerating moderate intensity statin therapy well.   LDL  "Cholesterol Calculated   Date Value Ref Range Status   02/02/2024 95 <=100 mg/dL Final   03/08/2021 52 <100 mg/dL Final     Comment:     Desirable:       <100 mg/dl     LDL Cholesterol Direct   Date Value Ref Range Status   03/08/2022 79 <100 mg/dL Final     Comment:     Age 0-19 years:  Desirable: 0-110 mg/dL   Borderline high: 110-129 mg/dL   High: >= 130 mg/dL    Age 20 years and older:  Desirable: <100mg/dL  Above desirable: 100-129 mg/dL   Borderline high: 130-159 mg/dL   High: 160-189 mg/dL   Very high: >= 190 mg/dL      Plan: Continue.    (N40.1,  R35.0) Benign prostatic hypertrophy with urinary frequency  Comment: Increased frequency, for now, we will focus on lowering blood sugar levels.  PSA quite low.  Plan: Readdress in 3 months.    Patient Instructions   I'll send in for the Niharika 3 glucose monitor. Hopefully, it will be covered.     Restart the Ozempic: okay to take every other week. I'll send in for a new prescription.     Increase the blood pressure medication, amlodipine, to 10 mg daily. I'll send for a new prescription. In the meantime, take (2) of the 5 mg tablets.     We can try a medication to help with the alcohol cravings. It's called naltrexone. Once per day.     Follow up appointment in 3 months.     Controlling your sugars will help with the frequent urination.     Okay to increase the Lantus by 3 units per week.     Call or My Chart with any questions or concerns.                BMI  Estimated body mass index is 27.42 kg/m  as calculated from the following:    Height as of this encounter: 1.77 m (5' 9.69\").    Weight as of this encounter: 85.9 kg (189 lb 6.4 oz).         Regular exercise    Subjective   Stewart is a 58 year old, presenting for the following health issues:  Diabetes / Hypertension 2 Month Follow up and Urgency  Urgency comes and goes.   Needs refills on nodilayle Niharika 3.  Blood pressure medication is not working 178/78 176/80, checking BP every Sunday.      8/13/2024    " 10:33 AM   Additional Questions   Roomed by Kennedi YOUNGBLOOD     [facility]       Diabetes Follow-up    How often are you checking your blood sugar? Two times daily  Blood sugar testing frequency justification:  Adjustment of medication(s)  What time of day are you checking your blood sugars (select all that apply)?  Before and after meals  Have you had any blood sugars above 200?  Yes sometimes in the AM  Have you had any blood sugars below 70?  No  What symptoms do you notice when your blood sugar is low?  Shaky, Dizzy, Weak, and Lethargy  What concerns do you have today about your diabetes? None and Other: Correct dosage   Do you have any of these symptoms? (Select all that apply)  No numbness or tingling in feet.  No redness, sores or blisters on feet.  No complaints of excessive thirst.  No reports of blurry vision.  No significant changes to weight.          Hyperlipidemia Follow-Up    Are you regularly taking any medication or supplement to lower your cholesterol?   Yes-    Are you having muscle aches or other side effects that you think could be caused by your cholesterol lowering medication?  No    Hypertension Follow-up    Do you check your blood pressure regularly outside of the clinic? Yes Once every Sunday  Are you following a low salt diet? No  Are your blood pressures ever more than 140 on the top number (systolic) OR more   than 90 on the bottom number (diastolic), for example 140/90? Yes    BP Readings from Last 2 Encounters:   08/13/24 (!) 176/80   06/07/24 (!) 162/80     Hemoglobin A1C (%)   Date Value   02/02/2024 8.6 (H)   11/01/2023 9.0 (H)   06/15/2021 9.2 (H)   03/08/2021 8.8 (H)     LDL Cholesterol Calculated (mg/dL)   Date Value   02/02/2024 95   03/15/2023 82   03/08/2021 52   06/22/2020 47     LDL Cholesterol Direct (mg/dL)   Date Value   03/08/2022 79     How many servings of fruits and vegetables do you eat daily?  0-1  On average, how many sweetened beverages do you drink each day (Examples: soda,  "juice, sweet tea, etc.  Do NOT count diet or artificially sweetened beverages)?   1  How many days per week do you exercise enough to make your heart beat faster? 3 or less  How many minutes a day do you exercise enough to make your heart beat faster? 9 or less  How many days per week do you miss taking your medication? 0        Review of Systems  CONSTITUTIONAL: NEGATIVE for fever, chills, change in weight  ENT/MOUTH: NEGATIVE for ear, mouth and throat problems  RESP: NEGATIVE for significant cough or SOB  CV: NEGATIVE for chest pain, palpitations or peripheral edema  : Urinary frequency.  PSYCHIATRIC: NEGATIVE for changes in mood or affect      Objective    BP (!) 176/80   Pulse 97   Temp 96.8  F (36  C) (Temporal)   Resp 18   Ht 1.77 m (5' 9.69\")   Wt 85.9 kg (189 lb 6.4 oz)   SpO2 100%   BMI 27.42 kg/m    Body mass index is 27.42 kg/m .  Physical Exam   GENERAL: Healthy, alert and no distress  EYES: Eyes grossly normal to inspection, conjunctivae and sclerae normal  RESP: Lungs clear to auscultation - no rales, rhonchi or wheezes  CV: Regular rate and rhythm, normal S1 S2, no murmur  MS: No gross musculoskeletal defects noted, no edema  NEURO: Normal strength and tone, mentation intact and speech normal  PSYCH: Mentation appears normal, affect normal/bright     The longitudinal plan of care for the diagnosis(es)/condition(s) as documented were addressed during this visit. Due to the added complexity in care, I will continue to support Stewart in the subsequent management and with ongoing continuity of care.         Signed Electronically by: Anahi Jean MD    Answers submitted by the patient for this visit:  Patient Health Questionnaire (Submitted on 8/13/2024)  If you checked off any problems, how difficult have these problems made it for you to do your work, take care of things at home, or get along with other people?: Not difficult at all  PHQ9 TOTAL SCORE: 1  Combined Diabetes / Lipid/ " Hypertension Visit (Submitted on 8/13/2024)  Frequency of checking blood sugars:: two times daily  Hypoglycemia symptoms:: dizziness  Dietary sodium intake:: No

## 2024-10-17 DIAGNOSIS — Z79.4 CONTROLLED TYPE 2 DIABETES MELLITUS WITHOUT COMPLICATION, WITH LONG-TERM CURRENT USE OF INSULIN (H): ICD-10-CM

## 2024-10-17 DIAGNOSIS — E11.9 CONTROLLED TYPE 2 DIABETES MELLITUS WITHOUT COMPLICATION, WITH LONG-TERM CURRENT USE OF INSULIN (H): ICD-10-CM

## 2024-10-17 RX ORDER — HYDROCHLOROTHIAZIDE 12.5 MG/1
CAPSULE ORAL
Qty: 6 EACH | Refills: 1 | Status: SHIPPED | OUTPATIENT
Start: 2024-10-17

## 2024-11-05 DIAGNOSIS — E11.65 UNCONTROLLED TYPE 2 DIABETES MELLITUS WITH HYPERGLYCEMIA, WITH LONG-TERM CURRENT USE OF INSULIN (H): ICD-10-CM

## 2024-11-05 DIAGNOSIS — E11.9 CONTROLLED TYPE 2 DIABETES MELLITUS WITHOUT COMPLICATION (H): ICD-10-CM

## 2024-11-05 DIAGNOSIS — E11.9 CONTROLLED TYPE 2 DIABETES MELLITUS WITHOUT COMPLICATION, WITH LONG-TERM CURRENT USE OF INSULIN (H): ICD-10-CM

## 2024-11-05 DIAGNOSIS — Z79.4 UNCONTROLLED TYPE 2 DIABETES MELLITUS WITH HYPERGLYCEMIA, WITH LONG-TERM CURRENT USE OF INSULIN (H): ICD-10-CM

## 2024-11-05 DIAGNOSIS — Z79.4 CONTROLLED TYPE 2 DIABETES MELLITUS WITHOUT COMPLICATION, WITH LONG-TERM CURRENT USE OF INSULIN (H): ICD-10-CM

## 2024-11-06 RX ORDER — INSULIN LISPRO 100 [IU]/ML
INJECTION, SOLUTION INTRAVENOUS; SUBCUTANEOUS
Qty: 30 ML | Refills: 1 | Status: SHIPPED | OUTPATIENT
Start: 2024-11-06 | End: 2024-11-11

## 2024-11-06 RX ORDER — SEMAGLUTIDE 0.68 MG/ML
INJECTION, SOLUTION SUBCUTANEOUS
Qty: 3 ML | Refills: 1 | Status: SHIPPED | OUTPATIENT
Start: 2024-11-06

## 2024-11-11 RX ORDER — INSULIN LISPRO 100 [IU]/ML
INJECTION, SOLUTION INTRAVENOUS; SUBCUTANEOUS
Qty: 30 ML | Refills: 1 | Status: SHIPPED | OUTPATIENT
Start: 2024-11-11

## 2024-11-11 NOTE — TELEPHONE ENCOUNTER
E-Prescribing Status: Transmission to pharmacy failed (11/6/2024  8:22 AM CST) for HUMALOG KWIKPEN.    Rx re-sent.    Edwina Corona RN

## 2024-11-25 ENCOUNTER — TELEPHONE (OUTPATIENT)
Dept: FAMILY MEDICINE | Facility: CLINIC | Age: 59
End: 2024-11-25
Payer: COMMERCIAL

## 2024-11-25 NOTE — TELEPHONE ENCOUNTER
"Stewart is requesting  Malaria treatment along with an antibiotic for travel to [place] (leaving on December 5).     Patient reports that he has been having urinary issues \"off & on\" related to taking Jardiance.     Stewart said he had been taking Tamsulosin in the past (stopped in 2018). He spoke with a pharmacist and they suggested combining Flomax with Finasteride for best results.     Patient agreed to schedule an appointment to address these concerns.     Future Appointments 11/25/2024 - 5/24/2025        Date Visit Type Length Department Provider     11/26/2024  1:30 PM OFFICE VISIT 30 min [facility] Anahi Jean MD    Location Instructions:     [facility] is located at [id number] [place] [address], one block east of [address] and just north of the [place]. Access the parking lot by turning east from [address] onto [address], then turning north on [address].                   Petra Albarran RN BSN  [facility]     "

## 2024-11-26 ENCOUNTER — OFFICE VISIT (OUTPATIENT)
Dept: FAMILY MEDICINE | Facility: CLINIC | Age: 59
End: 2024-11-26
Payer: COMMERCIAL

## 2024-11-26 VITALS
HEART RATE: 105 BPM | WEIGHT: 195 LBS | HEIGHT: 70 IN | OXYGEN SATURATION: 100 % | RESPIRATION RATE: 20 BRPM | DIASTOLIC BLOOD PRESSURE: 72 MMHG | TEMPERATURE: 98.1 F | BODY MASS INDEX: 27.92 KG/M2 | SYSTOLIC BLOOD PRESSURE: 152 MMHG

## 2024-11-26 DIAGNOSIS — I10 HYPERTENSION GOAL BP (BLOOD PRESSURE) < 140/90: ICD-10-CM

## 2024-11-26 DIAGNOSIS — Z23 NEED FOR INFLUENZA VACCINATION: ICD-10-CM

## 2024-11-26 DIAGNOSIS — N40.1 BENIGN PROSTATIC HYPERPLASIA WITH URINARY FREQUENCY: ICD-10-CM

## 2024-11-26 DIAGNOSIS — R35.0 BENIGN PROSTATIC HYPERPLASIA WITH URINARY FREQUENCY: ICD-10-CM

## 2024-11-26 DIAGNOSIS — Z23 NEED FOR COVID-19 VACCINE: ICD-10-CM

## 2024-11-26 DIAGNOSIS — Z71.84 TRAVEL ADVICE ENCOUNTER: ICD-10-CM

## 2024-11-26 DIAGNOSIS — E11.65 UNCONTROLLED TYPE 2 DIABETES MELLITUS WITH HYPERGLYCEMIA, WITH LONG-TERM CURRENT USE OF INSULIN (H): Primary | ICD-10-CM

## 2024-11-26 DIAGNOSIS — F10.10 ALCOHOL ABUSE: ICD-10-CM

## 2024-11-26 DIAGNOSIS — Z79.4 UNCONTROLLED TYPE 2 DIABETES MELLITUS WITH HYPERGLYCEMIA, WITH LONG-TERM CURRENT USE OF INSULIN (H): Primary | ICD-10-CM

## 2024-11-26 LAB
EST. AVERAGE GLUCOSE BLD GHB EST-MCNC: 194 MG/DL
HBA1C MFR BLD: 8.4 % (ref 0–5.6)

## 2024-11-26 PROCEDURE — 91320 SARSCV2 VAC 30MCG TRS-SUC IM: CPT | Performed by: FAMILY MEDICINE

## 2024-11-26 PROCEDURE — 36415 COLL VENOUS BLD VENIPUNCTURE: CPT | Performed by: FAMILY MEDICINE

## 2024-11-26 PROCEDURE — 83036 HEMOGLOBIN GLYCOSYLATED A1C: CPT | Performed by: FAMILY MEDICINE

## 2024-11-26 PROCEDURE — 90471 IMMUNIZATION ADMIN: CPT | Performed by: FAMILY MEDICINE

## 2024-11-26 PROCEDURE — 90480 ADMN SARSCOV2 VAC 1/ONLY CMP: CPT | Performed by: FAMILY MEDICINE

## 2024-11-26 PROCEDURE — 99214 OFFICE O/P EST MOD 30 MIN: CPT | Mod: 25 | Performed by: FAMILY MEDICINE

## 2024-11-26 PROCEDURE — 90673 RIV3 VACCINE NO PRESERV IM: CPT | Performed by: FAMILY MEDICINE

## 2024-11-26 RX ORDER — FINASTERIDE 5 MG/1
5 TABLET, FILM COATED ORAL DAILY
Qty: 90 TABLET | Refills: 1 | Status: SHIPPED | OUTPATIENT
Start: 2024-11-26

## 2024-11-26 RX ORDER — AZITHROMYCIN 250 MG/1
TABLET, FILM COATED ORAL
Qty: 6 TABLET | Refills: 0 | Status: SHIPPED | OUTPATIENT
Start: 2024-11-26 | End: 2024-12-01

## 2024-11-26 RX ORDER — TAMSULOSIN HYDROCHLORIDE 0.4 MG/1
0.4 CAPSULE ORAL DAILY
Qty: 90 CAPSULE | Refills: 1 | Status: SHIPPED | OUTPATIENT
Start: 2024-11-26

## 2024-11-26 RX ORDER — ATOVAQUONE AND PROGUANIL HYDROCHLORIDE 250; 100 MG/1; MG/1
1 TABLET, FILM COATED ORAL DAILY
Qty: 25 TABLET | Refills: 0 | Status: SHIPPED | OUTPATIENT
Start: 2024-11-26

## 2024-11-26 ASSESSMENT — PAIN SCALES - GENERAL: PAINLEVEL_OUTOF10: NO PAIN (0)

## 2024-11-26 NOTE — PROGRESS NOTES
Assessment & Plan     (E11.65,  Z79.4) Uncontrolled type 2 diabetes mellitus with hyperglycemia, with long-term current use of insulin (H)  (primary encounter diagnosis)  Comment: Improved, but still above recommended guidelines.  Currently on intensive medical regimen for his diabetes including short and long-acting insulin, SGLT2 inhibitor, GLP-1 agonist, metformin.  Lab Results   Component Value Date    A1C 8.4 11/26/2024    A1C 9.3 08/13/2024    A1C 8.6 02/02/2024    A1C 9.0 11/01/2023    A1C 8.7 07/27/2023    A1C 9.2 06/15/2021    A1C 8.8 03/08/2021    A1C 7.7 06/22/2020    A1C 8.1 02/14/2020    A1C 7.0 11/15/2019      Plan: HEMOGLOBIN A1C        Continue current management.  Follow-up in 3 months.    (N40.1,  R35.0) Benign prostatic hyperplasia with urinary frequency  Comment: Nocturia, symptomatic.  Patient has been on tamsulosin in the past and tolerated this medication well.  Plan: tamsulosin (FLOMAX) 0.4 MG capsule, finasteride        (PROSCAR) 5 MG tablet        Will restart tamsulosin, add finasteride.  Reviewed side effects.  Follow-up in 3 months    (I10) Hypertension goal BP (blood pressure) < 140/90  Comment: Elevated even with second reading.  For now, continue on current medications including ARB plus amlodipine.  Patient hesitant to use diuretic therapy secondary to nocturia.  Plan: Will recheck in 3 months.    (F10.10) Alcohol abuse  Comment: Ongoing, encouraged the patient to continue daily naltrexone to curb cravings.  Plan: Will readdress at next visit.    (Z23) Need for influenza vaccination  Plan: INFLUENZA VACCINE TRIVALENT(FLUBLOK)            (Z23) Need for COVID-19 vaccine  Plan: COVID-19 12+ (PFIZER)            (Z71.84) Travel advice encounter  Comment: Patient will be traveling to [place], [place]  Plan: atovaquone-proguanil (MALARONE) 250-100 MG         tablet, azithromycin (ZITHROMAX) 250 MG tablet        Medications given for malaria prevention in case of traveler's  diarrhea.    Results for orders placed or performed in visit on 11/26/24   HEMOGLOBIN A1C     Status: Abnormal   Result Value Ref Range    Estimated Average Glucose 194 (H) <117 mg/dL    Hemoglobin A1C 8.4 (H) 0.0 - 5.6 %        Patient Instructions   Will check the A1c today.     Flu shot, covid shot.     Malaria and diarrhea medication went sent to your pharmacy.     For the urination, try the combination of tamsulosin and finasteride.     Stay on the naltrexone for decreasing alcohol cravings. Take every day.    Try to eat less at night.     May increase your Lantus dose by 10 % (4 units) per week.     Follow up appointment in 3 months.            Paty William is a 58 year old, presenting for the following health issues:  Diabetes and travel medicaton         11/26/2024     1:08 PM   Additional Questions   Roomed by Vicenta Valdes CMA   Accompanied by None         11/26/2024     1:08 PM   Patient Reported Additional Medications   Patient reports taking the following new medications None     History of Present Illness       Diabetes:   He presents for follow up of diabetes.  He is checking home blood glucose two times daily.   He checks blood glucose after meals.  Blood glucose is sometimes over 200 and never under 70. He is aware of hypoglycemia symptoms including weakness.   He is concerned about blood sugar frequently over 200.    He is not experiencing numbness or burning in feet, excessive thirst, blurry vision, weight changes or redness, sores or blisters on feet.           He eats 0-1 servings of fruits and vegetables daily.He consumes 1 sweetened beverage(s) daily.He exercises with enough effort to increase his heart rate 9 or less minutes per day.  He exercises with enough effort to increase his heart rate 3 or less days per week.   He is taking medications regularly.           Review of Systems  Constitutional, HEENT, cardiovascular, pulmonary, gi and gu systems are negative, except as otherwise  "noted.      Objective    BP (!) 152/72   Pulse 105   Temp 98.1  F (36.7  C) (Temporal)   Resp 20   Ht 1.778 m (5' 10\")   Wt 88.5 kg (195 lb)   SpO2 100%   BMI 27.98 kg/m    Body mass index is 27.98 kg/m .  Physical Exam   GENERAL: Healthy, alert and no distress  EYES: Eyes grossly normal to inspection, conjunctivae and sclerae normal  RESP: Lungs clear to auscultation - no rales, rhonchi or wheezes  CV: Regular rate and rhythm, normal S1 S2, no murmur  MS: No gross musculoskeletal defects noted, no edema  NEURO: Normal strength and tone, mentation intact and speech normal  PSYCH: Mentation appears normal, affect normal/bright     The longitudinal plan of care for the diagnosis(es)/condition(s) as documented were addressed during this visit. Due to the added complexity in care, I will continue to support Stewart in the subsequent management and with ongoing continuity of care.         Signed Electronically by: Anahi Jean MD    "

## 2024-11-26 NOTE — PATIENT INSTRUCTIONS
Will check the A1c today.     Flu shot, covid shot.     Malaria and diarrhea medication went sent to your pharmacy.     For the urination, try the combination of tamsulosin and finasteride.     Stay on the naltrexone for decreasing alcohol cravings. Take every day.    Try to eat less at night.     May increase your Lantus dose by 10 % (4 units) per week.     Follow up appointment in 3 months.

## 2025-02-08 DIAGNOSIS — I10 HYPERTENSION GOAL BP (BLOOD PRESSURE) < 140/90: ICD-10-CM

## 2025-02-09 RX ORDER — AMLODIPINE BESYLATE 10 MG/1
10 TABLET ORAL DAILY
Qty: 90 TABLET | Refills: 1 | Status: SHIPPED | OUTPATIENT
Start: 2025-02-09

## 2025-02-25 DIAGNOSIS — E11.65 UNCONTROLLED TYPE 2 DIABETES MELLITUS WITH HYPERGLYCEMIA (H): ICD-10-CM

## 2025-02-25 RX ORDER — EMPAGLIFLOZIN 10 MG/1
TABLET, FILM COATED ORAL
Qty: 90 TABLET | Refills: 1 | Status: SHIPPED | OUTPATIENT
Start: 2025-02-25

## 2025-03-01 ENCOUNTER — HEALTH MAINTENANCE LETTER (OUTPATIENT)
Age: 60
End: 2025-03-01

## 2025-03-11 ENCOUNTER — TRANSFERRED RECORDS (OUTPATIENT)
Dept: HEALTH INFORMATION MANAGEMENT | Facility: CLINIC | Age: 60
End: 2025-03-11
Payer: COMMERCIAL

## 2025-03-11 LAB — RETINOPATHY: POSITIVE

## 2025-03-18 ENCOUNTER — OFFICE VISIT (OUTPATIENT)
Dept: FAMILY MEDICINE | Facility: CLINIC | Age: 60
End: 2025-03-18
Payer: COMMERCIAL

## 2025-03-18 VITALS
SYSTOLIC BLOOD PRESSURE: 138 MMHG | HEART RATE: 85 BPM | BODY MASS INDEX: 28.2 KG/M2 | WEIGHT: 197 LBS | RESPIRATION RATE: 20 BRPM | HEIGHT: 70 IN | OXYGEN SATURATION: 100 % | TEMPERATURE: 98.2 F | DIASTOLIC BLOOD PRESSURE: 78 MMHG

## 2025-03-18 DIAGNOSIS — Z00.00 ROUTINE HISTORY AND PHYSICAL EXAMINATION OF ADULT: Primary | ICD-10-CM

## 2025-03-18 DIAGNOSIS — N40.1 BENIGN PROSTATIC HYPERPLASIA WITH URINARY FREQUENCY: ICD-10-CM

## 2025-03-18 DIAGNOSIS — I10 HYPERTENSION GOAL BP (BLOOD PRESSURE) < 140/90: ICD-10-CM

## 2025-03-18 DIAGNOSIS — E78.5 HYPERLIPIDEMIA WITH TARGET LDL LESS THAN 100: ICD-10-CM

## 2025-03-18 DIAGNOSIS — Z29.11 NEED FOR RSV IMMUNIZATION: ICD-10-CM

## 2025-03-18 DIAGNOSIS — Z23 NEED FOR PNEUMOCOCCAL 20-VALENT CONJUGATE VACCINATION: ICD-10-CM

## 2025-03-18 DIAGNOSIS — Z23 NEED FOR SHINGLES VACCINE: ICD-10-CM

## 2025-03-18 DIAGNOSIS — R35.0 BENIGN PROSTATIC HYPERPLASIA WITH URINARY FREQUENCY: ICD-10-CM

## 2025-03-18 DIAGNOSIS — E11.9 CONTROLLED TYPE 2 DIABETES MELLITUS WITHOUT COMPLICATION, WITH LONG-TERM CURRENT USE OF INSULIN (H): ICD-10-CM

## 2025-03-18 DIAGNOSIS — Z79.4 CONTROLLED TYPE 2 DIABETES MELLITUS WITHOUT COMPLICATION, WITH LONG-TERM CURRENT USE OF INSULIN (H): ICD-10-CM

## 2025-03-18 PROCEDURE — 3075F SYST BP GE 130 - 139MM HG: CPT | Performed by: FAMILY MEDICINE

## 2025-03-18 PROCEDURE — 90750 HZV VACC RECOMBINANT IM: CPT | Performed by: FAMILY MEDICINE

## 2025-03-18 PROCEDURE — 99214 OFFICE O/P EST MOD 30 MIN: CPT | Mod: 25 | Performed by: FAMILY MEDICINE

## 2025-03-18 PROCEDURE — 90678 RSV VACC PREF BIVALENT IM: CPT | Performed by: FAMILY MEDICINE

## 2025-03-18 PROCEDURE — 90471 IMMUNIZATION ADMIN: CPT | Performed by: FAMILY MEDICINE

## 2025-03-18 PROCEDURE — 3078F DIAST BP <80 MM HG: CPT | Performed by: FAMILY MEDICINE

## 2025-03-18 PROCEDURE — G2211 COMPLEX E/M VISIT ADD ON: HCPCS | Performed by: FAMILY MEDICINE

## 2025-03-18 PROCEDURE — 90677 PCV20 VACCINE IM: CPT | Performed by: FAMILY MEDICINE

## 2025-03-18 PROCEDURE — 90472 IMMUNIZATION ADMIN EACH ADD: CPT | Performed by: FAMILY MEDICINE

## 2025-03-18 PROCEDURE — 1126F AMNT PAIN NOTED NONE PRSNT: CPT | Performed by: FAMILY MEDICINE

## 2025-03-18 PROCEDURE — 99396 PREV VISIT EST AGE 40-64: CPT | Mod: 25 | Performed by: FAMILY MEDICINE

## 2025-03-18 RX ORDER — HYDROCHLOROTHIAZIDE 12.5 MG/1
CAPSULE ORAL
Qty: 6 EACH | Refills: 1 | Status: SHIPPED | OUTPATIENT
Start: 2025-03-18

## 2025-03-18 RX ORDER — TADALAFIL 5 MG/1
5 TABLET ORAL EVERY 24 HOURS
Qty: 90 TABLET | Refills: 1 | Status: SHIPPED | OUTPATIENT
Start: 2025-03-18

## 2025-03-18 RX ORDER — INSULIN GLARGINE 100 [IU]/ML
50 INJECTION, SOLUTION SUBCUTANEOUS AT BEDTIME
Qty: 60 ML | Refills: 1 | Status: SHIPPED | OUTPATIENT
Start: 2025-03-18

## 2025-03-18 SDOH — HEALTH STABILITY: PHYSICAL HEALTH: ON AVERAGE, HOW MANY DAYS PER WEEK DO YOU ENGAGE IN MODERATE TO STRENUOUS EXERCISE (LIKE A BRISK WALK)?: 3 DAYS

## 2025-03-18 ASSESSMENT — PAIN SCALES - GENERAL: PAINLEVEL_OUTOF10: NO PAIN (0)

## 2025-03-18 ASSESSMENT — SOCIAL DETERMINANTS OF HEALTH (SDOH): HOW OFTEN DO YOU GET TOGETHER WITH FRIENDS OR RELATIVES?: MORE THAN THREE TIMES A WEEK

## 2025-03-18 NOTE — PROGRESS NOTES
Preventive Care Visit  [facility] PRISCILLA Jean MD, Family Medicine  Mar 18, 2025      Assessment & Plan       (Z00.00) Routine history and physical examination of adult  (primary encounter diagnosis)  Comment:  Reviewed the need for periodic healthcare exams and screenings.   Plan: advised yearly check up.     (E11.9,  Z79.4) Controlled type 2 diabetes mellitus without complication, with long-term current use of insulin (H)   Comment: Improved, but still above recommended guidelines.  Will increase dose of Jardiance from 10 mg daily to 25 mg daily.  Discussed increased urination.  Also advised to increase his Lantus by 5 units.  Patient is now at 45 units daily.  Lab Results   Component Value Date    A1C 8.2 03/14/2025    A1C 8.4 11/26/2024    A1C 9.3 08/13/2024    A1C 8.6 02/02/2024    A1C 9.0 11/01/2023    A1C 9.2 06/15/2021    A1C 8.8 03/08/2021    A1C 7.7 06/22/2020    A1C 8.1 02/14/2020    A1C 7.0 11/15/2019      Plan: empagliflozin (JARDIANCE) 25 MG TABS tablet,         insulin glargine (LANTUS SOLOSTAR) 100 UNIT/ML         pen, Continuous Glucose Sensor (FREESTYLE CINDY        3 PLUS SENSOR) MISC        Recheck A1c in 3 months, clinic visit in 6 months.  Physical on    (N40.1,  R35.0) Benign prostatic hypertrophy with urinary frequency  Comment: Patient notes nocturia and also some associated ED.  Plan: tadalafil (CIALIS) 5 MG tablet        Will try daily tadalafil to see if this will address both concerns.    (E78.5) Hyperlipidemia with target LDL less than 100  Comment: Tolerating moderate intensity statin therapy well.  No documented secondary complications.   LDL Cholesterol Calculated   Date Value Ref Range Status   03/14/2025 49 <100 mg/dL Final   03/08/2021 52 <100 mg/dL Final     Comment:     Desirable:       <100 mg/dl     LDL Cholesterol Direct   Date Value Ref Range Status   03/08/2022 79 <100 mg/dL Final     Comment:     Age 0-19 years:  Desirable: 0-110 mg/dL  "  Borderline high: 110-129 mg/dL   High: >= 130 mg/dL    Age 20 years and older:  Desirable: <100mg/dL  Above desirable: 100-129 mg/dL   Borderline high: 130-159 mg/dL   High: 160-189 mg/dL   Very high: >= 190 mg/dL      Plan: Continue.    (I10) Hypertension goal BP (blood pressure) < 140/90  Comment: Within guidelines using 2 drug regiment, amlodipine plus ARB.  Normal kidney function.  Plan: Continue.    (Z29.11) Need for RSV immunization  Plan: RSV VACCINE (ABRYSVO)            (Z23) Need for shingles vaccine  Plan: ZOSTER RECOMBINANT ADJUVANTED (SHINGRIX)            (Z23) Need for pneumococcal 20-valent conjugate vaccination  Plan: Pneumococcal 20 Valent Conjugate (PCV20)                Patient has been advised of split billing requirements and indicates understanding: Yes        BMI  Estimated body mass index is 28.67 kg/m  as calculated from the following:    Height as of this encounter: 1.765 m (5' 9.5\").    Weight as of this encounter: 89.4 kg (197 lb).   Weight management plan: Discussed healthy diet and exercise guidelines    Counseling  Appropriate preventive services were addressed with this patient via screening, questionnaire, or discussion as appropriate for fall prevention, nutrition, physical activity, Tobacco-use cessation, social engagement, weight loss and cognition.  Checklist reviewing preventive services available has been given to the patient.  Reviewed patient's diet, addressing concerns and/or questions.   He is at risk for lack of exercise and has been provided with information to increase physical activity for the benefit of his well-being.   He is at risk for psychosocial distress and has been provided with information to reduce risk.   The patient reports drinking more than 3 alcoholic drinks per day and/or more than 7 drhnks per week. The patient was counseled and given information about possible harmful effects of excessive alcohol intake.        Paty William is a 59 year old, " presenting for the following:  Physical        3/18/2025    10:21 AM   Additional Questions   Roomed by Vicenta Valdes CMA   Accompanied by None         3/18/2025    10:21 AM   Patient Reported Additional Medications   Patient reports taking the following new medications None          HPI    Advance Care Planning  Patient does not have a Health Care Directive: Discussed advance care planning with patient; information given to patient to review.      3/18/2025   General Health   How would you rate your overall physical health? Good   Feel stress (tense, anxious, or unable to sleep) To some extent   (!) STRESS CONCERN      3/18/2025   Nutrition   Three or more servings of calcium each day? Yes   Diet: Regular (no restrictions)   How many servings of fruit and vegetables per day? (!) 0-1   How many sweetened beverages each day? 0-1         3/18/2025   Exercise   Days per week of moderate/strenous exercise 3 days   (!) EXERCISE CONCERN      3/18/2025   Social Factors   Frequency of gathering with friends or relatives More than three times a week         3/18/2025   Dental   Dentist two times every year? Yes           Today's PHQ-9 Score:       3/13/2025    10:22 AM   PHQ-9 SCORE   PHQ-9 Total Score MyChart 1 (Minimal depression)   PHQ-9 Total Score 1        Patient-reported           3/18/2025   Substance Use   Alcohol more than 3/day or more than 7/wk Yes   How often do you have a drink containing alcohol 4 or more times a week   How many alcohol drinks on typical day 1 or 2   How often do you have 5+ drinks at one occasion Monthly   Audit 2/3 Score 2     Social History     Tobacco Use    Smoking status: Never     Passive exposure: Never    Smokeless tobacco: Never    Tobacco comments:     Non Smoker   Vaping Use    Vaping status: Never Used   Substance Use Topics    Alcohol use: Yes     Comment: 3-4 bottles of wine a week    Drug use: No           3/13/2025   STI Screening   New sexual partner(s) since last STI/HIV  "test? No   Last PSA:   PSA   Date Value Ref Range Status   03/08/2021 0.18 0 - 4 ug/L Final     Comment:     Assay Method:  Chemiluminescence using Siemens Vista analyzer     Prostate Specific Antigen Screen   Date Value Ref Range Status   03/14/2025 0.12 0.00 - 4.50 ng/mL Final   03/08/2022 0.21 0.00 - 4.00 ug/L Final     ASCVD Risk   The 10-year ASCVD risk score (Emy PATEL, et al., 2019) is: 27.4%    Values used to calculate the score:      Age: 59 years      Sex: Male      Is Non- : Yes      Diabetic: Yes      Tobacco smoker: No      Systolic Blood Pressure: 138 mmHg      Is BP treated: Yes      HDL Cholesterol: 35 mg/dL      Total Cholesterol: 145 mg/dL           Reviewed and updated as needed this visit by Provider                    Labs reviewed in EPIC      Review of Systems  Constitutional, neuro, ENT, endocrine, pulmonary, cardiac, gastrointestinal, genitourinary, musculoskeletal, integument and psychiatric systems are negative, except as otherwise noted.     Objective    Exam  /78   Pulse 85   Temp 98.2  F (36.8  C) (Temporal)   Resp 20   Ht 1.765 m (5' 9.5\")   Wt 89.4 kg (197 lb)   SpO2 100%   BMI 28.67 kg/m     Estimated body mass index is 28.67 kg/m  as calculated from the following:    Height as of this encounter: 1.765 m (5' 9.5\").    Weight as of this encounter: 89.4 kg (197 lb).    Physical Exam  GENERAL: Healthy, alert and no distress  EYES: Eyes grossly normal to inspection, conjunctivae and sclerae normal  RESP: Lungs clear to auscultation - no rales, rhonchi or wheezes  CV: Regular rate and rhythm, normal S1 S2, no murmur  MS: No gross musculoskeletal defects noted, no edema  NEURO: Normal strength and tone, mentation intact and speech normal  PSYCH: Mentation appears normal, affect normal/bright     The longitudinal plan of care for the diagnosis(es)/condition(s) as documented were addressed during this visit. Due to the added complexity in care, I " will continue to support Stewart in the subsequent management and with ongoing continuity of care.     Prior to immunization administration, verified patients identity using patient s name and date of birth. Please see Immunization Activity for additional information.     Screening Questionnaire for Adult Immunization    Are you sick today?   No   Do you have allergies to medications, food, a vaccine component or latex?   No   Have you ever had a serious reaction after receiving a vaccination?   No   Do you have a long-term health problem with heart, lung, kidney, or metabolic disease (e.g., diabetes), asthma, a blood disorder, no spleen, complement component deficiency, a cochlear implant, or a spinal fluid leak?  Are you on long-term aspirin therapy?   No   Do you have cancer, leukemia, HIV/AIDS, or any other immune system problem?   No   Do you have a parent, brother, or sister with an immune system problem?   No   In the past 3 months, have you taken medications that affect  your immune system, such as prednisone, other steroids, or anticancer drugs; drugs for the treatment of rheumatoid arthritis, Crohn s disease, or psoriasis; or have you had radiation treatments?   No   Have you had a seizure, or a brain or other nervous system problem?   No   During the past year, have you received a transfusion of blood or blood    products, or been given immune (gamma) globulin or antiviral drug?   No   For women: Are you pregnant or is there a chance you could become       pregnant during the next month?   No   Have you received any vaccinations in the past 4 weeks?   No     Immunization questionnaire answers were all negative.      Patient instructed to remain in clinic for 15 minutes afterwards, and to report any adverse reactions.     Screening performed by Vicenta Valdes MA on 3/18/2025 at 11:08 AM.         Signed Electronically by: Anahi Jean MD

## 2025-03-18 NOTE — PATIENT INSTRUCTIONS
"Increase the Jardiance to 25 mg daily.    I'll send in for a higher dose of Lantus insulin. Try 45- 50 units.     I'll send in for a prescription of Cialis, take 5 mg daily. This prescription is at [facility].     Recheck A1c in 3 month. This would be a \"lab only\" appointment, no need for an office visit. Please call our clinic phone # or use [employer],  to set this up.      Follow up clinic visit in 6 months.     Shots today: RSV, shingles, and pneumonia.   "

## 2025-03-25 ENCOUNTER — TELEPHONE (OUTPATIENT)
Dept: FAMILY MEDICINE | Facility: CLINIC | Age: 60
End: 2025-03-25
Payer: COMMERCIAL

## 2025-03-25 DIAGNOSIS — E11.9 CONTROLLED TYPE 2 DIABETES MELLITUS WITHOUT COMPLICATION, WITH LONG-TERM CURRENT USE OF INSULIN (H): Primary | ICD-10-CM

## 2025-03-25 DIAGNOSIS — Z79.4 CONTROLLED TYPE 2 DIABETES MELLITUS WITHOUT COMPLICATION, WITH LONG-TERM CURRENT USE OF INSULIN (H): Primary | ICD-10-CM

## 2025-03-25 RX ORDER — ACYCLOVIR 400 MG/1
1 TABLET ORAL ONCE
Qty: 1 EACH | Refills: 0 | Status: SHIPPED | OUTPATIENT
Start: 2025-03-25 | End: 2025-03-25

## 2025-03-25 RX ORDER — ACYCLOVIR 400 MG/1
1 TABLET ORAL
Qty: 9 EACH | Refills: 5 | Status: CANCELLED | OUTPATIENT
Start: 2025-03-25

## 2025-03-25 RX ORDER — ACYCLOVIR 400 MG/1
1 TABLET ORAL
Qty: 9 EACH | Refills: 5 | Status: SHIPPED | OUTPATIENT
Start: 2025-03-25

## 2025-03-25 RX ORDER — ACYCLOVIR 400 MG/1
1 TABLET ORAL ONCE
Qty: 1 EACH | Refills: 0 | Status: CANCELLED | OUTPATIENT
Start: 2025-03-25 | End: 2025-03-25

## 2025-03-25 RX ORDER — PROCHLORPERAZINE 25 MG/1
SUPPOSITORY RECTAL
Qty: 1 EACH | Refills: 1 | Status: CANCELLED | OUTPATIENT
Start: 2025-03-25

## 2025-03-25 NOTE — TELEPHONE ENCOUNTER
Patient calling to request prescription for Dexcom G7 . Patient states the Freestyle Niharika sensor causes irration on his skin and is not staying on.

## 2025-03-25 NOTE — TELEPHONE ENCOUNTER
Called and notified patient of the orders below per PcP.    Patient stated understanding and agreeable with the plan of care.     Ana COREYN   Triage Nurse RN  [facility]

## 2025-04-13 DIAGNOSIS — Z79.4 UNCONTROLLED TYPE 2 DIABETES MELLITUS WITH HYPERGLYCEMIA, WITH LONG-TERM CURRENT USE OF INSULIN (H): ICD-10-CM

## 2025-04-13 DIAGNOSIS — E11.65 UNCONTROLLED TYPE 2 DIABETES MELLITUS WITH HYPERGLYCEMIA, WITH LONG-TERM CURRENT USE OF INSULIN (H): ICD-10-CM

## 2025-04-14 RX ORDER — SEMAGLUTIDE 0.68 MG/ML
INJECTION, SOLUTION SUBCUTANEOUS
Qty: 3 ML | Refills: 1 | Status: SHIPPED | OUTPATIENT
Start: 2025-04-14

## 2025-05-03 DIAGNOSIS — E78.5 HYPERLIPIDEMIA LDL GOAL <100: ICD-10-CM

## 2025-05-04 RX ORDER — SIMVASTATIN 10 MG
10 TABLET ORAL AT BEDTIME
Qty: 90 TABLET | Refills: 2 | Status: SHIPPED | OUTPATIENT
Start: 2025-05-04

## 2025-05-22 DIAGNOSIS — N40.1 BENIGN PROSTATIC HYPERPLASIA WITH URINARY FREQUENCY: ICD-10-CM

## 2025-05-22 DIAGNOSIS — R35.0 BENIGN PROSTATIC HYPERPLASIA WITH URINARY FREQUENCY: ICD-10-CM

## 2025-05-22 RX ORDER — TAMSULOSIN HYDROCHLORIDE 0.4 MG/1
0.4 CAPSULE ORAL DAILY
Qty: 90 CAPSULE | Refills: 1 | Status: SHIPPED | OUTPATIENT
Start: 2025-05-22

## 2025-06-13 DIAGNOSIS — E11.9 CONTROLLED TYPE 2 DIABETES MELLITUS WITHOUT COMPLICATION, WITH LONG-TERM CURRENT USE OF INSULIN (H): ICD-10-CM

## 2025-06-13 DIAGNOSIS — Z79.4 CONTROLLED TYPE 2 DIABETES MELLITUS WITHOUT COMPLICATION, WITH LONG-TERM CURRENT USE OF INSULIN (H): ICD-10-CM

## 2025-06-15 ENCOUNTER — HEALTH MAINTENANCE LETTER (OUTPATIENT)
Age: 60
End: 2025-06-15

## 2025-06-16 RX ORDER — INSULIN LISPRO 100 [IU]/ML
INJECTION, SOLUTION INTRAVENOUS; SUBCUTANEOUS
Qty: 15 ML | Refills: 3 | Status: SHIPPED | OUTPATIENT
Start: 2025-06-16

## 2025-07-08 DIAGNOSIS — I10 HYPERTENSION GOAL BP (BLOOD PRESSURE) < 140/90: ICD-10-CM

## 2025-07-08 RX ORDER — LOSARTAN POTASSIUM 100 MG/1
100 TABLET ORAL DAILY
Qty: 90 TABLET | Refills: 1 | Status: SHIPPED | OUTPATIENT
Start: 2025-07-08

## 2025-07-30 DIAGNOSIS — E11.9 CONTROLLED TYPE 2 DIABETES MELLITUS WITHOUT COMPLICATION, WITH LONG-TERM CURRENT USE OF INSULIN (H): ICD-10-CM

## 2025-07-30 DIAGNOSIS — Z79.4 CONTROLLED TYPE 2 DIABETES MELLITUS WITHOUT COMPLICATION, WITH LONG-TERM CURRENT USE OF INSULIN (H): ICD-10-CM

## 2025-07-30 RX ORDER — BLOOD SUGAR DIAGNOSTIC
STRIP MISCELLANEOUS
Qty: 150 STRIP | Refills: 2 | Status: SHIPPED | OUTPATIENT
Start: 2025-07-30

## 2025-08-03 ENCOUNTER — TELEPHONE (OUTPATIENT)
Dept: FAMILY MEDICINE | Facility: CLINIC | Age: 60
End: 2025-08-03
Payer: COMMERCIAL

## 2025-08-03 DIAGNOSIS — N40.1 BENIGN PROSTATIC HYPERPLASIA WITH URINARY FREQUENCY: ICD-10-CM

## 2025-08-03 DIAGNOSIS — I10 HYPERTENSION GOAL BP (BLOOD PRESSURE) < 140/90: ICD-10-CM

## 2025-08-03 DIAGNOSIS — R35.0 BENIGN PROSTATIC HYPERPLASIA WITH URINARY FREQUENCY: ICD-10-CM

## 2025-08-04 RX ORDER — AMLODIPINE BESYLATE 10 MG/1
10 TABLET ORAL DAILY
Qty: 90 TABLET | Refills: 0 | Status: SHIPPED | OUTPATIENT
Start: 2025-08-04

## 2025-08-04 RX ORDER — FINASTERIDE 5 MG/1
1 TABLET, FILM COATED ORAL DAILY
Qty: 90 TABLET | Refills: 0 | Status: SHIPPED | OUTPATIENT
Start: 2025-08-04

## 2025-08-09 DIAGNOSIS — E11.9 CONTROLLED TYPE 2 DIABETES MELLITUS WITHOUT COMPLICATION (H): ICD-10-CM

## 2025-08-31 DIAGNOSIS — Z79.4 CONTROLLED TYPE 2 DIABETES MELLITUS WITHOUT COMPLICATION, WITH LONG-TERM CURRENT USE OF INSULIN (H): ICD-10-CM

## 2025-08-31 DIAGNOSIS — E11.9 CONTROLLED TYPE 2 DIABETES MELLITUS WITHOUT COMPLICATION, WITH LONG-TERM CURRENT USE OF INSULIN (H): ICD-10-CM

## 2025-09-04 RX ORDER — INSULIN GLARGINE 100 [IU]/ML
40 INJECTION, SOLUTION SUBCUTANEOUS AT BEDTIME
Qty: 30 ML | Refills: 5 | Status: SHIPPED | OUTPATIENT
Start: 2025-09-04

## (undated) DEVICE — PREP CHLORAPREP 26ML TINTED ORANGE  260815

## (undated) DEVICE — SOL WATER IRRIG 1000ML BOTTLE 07139-09

## (undated) DEVICE — KIT ENDO FIRST STEP DISINFECTANT 200ML W/POUCH EP-4

## (undated) DEVICE — PAD CHUX UNDERPAD 23X24" 7136

## (undated) RX ORDER — SIMETHICONE 40MG/0.6ML
SUSPENSION, DROPS(FINAL DOSAGE FORM)(ML) ORAL
Status: DISPENSED
Start: 2022-12-30

## (undated) RX ORDER — FENTANYL CITRATE 50 UG/ML
INJECTION, SOLUTION INTRAMUSCULAR; INTRAVENOUS
Status: DISPENSED
Start: 2022-12-30

## (undated) RX ORDER — SIMETHICONE 40MG/0.6ML
SUSPENSION, DROPS(FINAL DOSAGE FORM)(ML) ORAL
Status: DISPENSED
Start: 2019-08-23

## (undated) RX ORDER — HYDRALAZINE HYDROCHLORIDE 20 MG/ML
INJECTION INTRAMUSCULAR; INTRAVENOUS
Status: DISPENSED
Start: 2024-06-06

## (undated) RX ORDER — FENTANYL CITRATE 50 UG/ML
INJECTION, SOLUTION INTRAMUSCULAR; INTRAVENOUS
Status: DISPENSED
Start: 2019-08-23

## (undated) RX ORDER — FENTANYL CITRATE 50 UG/ML
INJECTION, SOLUTION INTRAMUSCULAR; INTRAVENOUS
Status: DISPENSED
Start: 2024-03-12